# Patient Record
Sex: FEMALE | Race: BLACK OR AFRICAN AMERICAN | NOT HISPANIC OR LATINO | Employment: UNEMPLOYED
[De-identification: names, ages, dates, MRNs, and addresses within clinical notes are randomized per-mention and may not be internally consistent; named-entity substitution may affect disease eponyms.]

---

## 2018-10-11 ENCOUNTER — RECORDS - HEALTHEAST (OUTPATIENT)
Dept: ADMINISTRATIVE | Facility: OTHER | Age: 33
End: 2018-10-11

## 2021-05-30 ENCOUNTER — RECORDS - HEALTHEAST (OUTPATIENT)
Dept: ADMINISTRATIVE | Facility: CLINIC | Age: 36
End: 2021-05-30

## 2021-11-24 ENCOUNTER — TELEPHONE (OUTPATIENT)
Dept: ENDOCRINOLOGY | Facility: CLINIC | Age: 36
End: 2021-11-24
Payer: COMMERCIAL

## 2021-11-24 NOTE — TELEPHONE ENCOUNTER
Patient interested in weight loss surgery    César Son    Instructed to check with insurance company regarding exclusions prior to first appt.    Scheduled to see CNP or LEAH at 1:00 pm,       Left message:12:13 pm    Can find information on [employer]ariatrix products at: [email or web address]    Instructed to view seminar and complete pre-visit questionnaire prior to visit at [email or web address].    [phone] contact center phone number      MAHESH LAMBERT CMA

## 2021-12-05 ENCOUNTER — HEALTH MAINTENANCE LETTER (OUTPATIENT)
Age: 36
End: 2021-12-05

## 2021-12-27 ENCOUNTER — LAB (OUTPATIENT)
Dept: LAB | Facility: CLINIC | Age: 36
End: 2021-12-27
Payer: COMMERCIAL

## 2021-12-27 ENCOUNTER — OFFICE VISIT (OUTPATIENT)
Dept: ENDOCRINOLOGY | Facility: CLINIC | Age: 36
End: 2021-12-27
Payer: COMMERCIAL

## 2021-12-27 VITALS
DIASTOLIC BLOOD PRESSURE: 93 MMHG | WEIGHT: 252.6 LBS | HEIGHT: 66 IN | BODY MASS INDEX: 40.6 KG/M2 | OXYGEN SATURATION: 98 % | SYSTOLIC BLOOD PRESSURE: 147 MMHG | HEART RATE: 89 BPM

## 2021-12-27 DIAGNOSIS — E66.813 CLASS 3 SEVERE OBESITY DUE TO EXCESS CALORIES WITH SERIOUS COMORBIDITY AND BODY MASS INDEX (BMI) OF 40.0 TO 44.9 IN ADULT (H): Primary | ICD-10-CM

## 2021-12-27 DIAGNOSIS — E66.01 CLASS 3 SEVERE OBESITY DUE TO EXCESS CALORIES WITH SERIOUS COMORBIDITY AND BODY MASS INDEX (BMI) OF 40.0 TO 44.9 IN ADULT (H): Primary | ICD-10-CM

## 2021-12-27 DIAGNOSIS — E66.813 CLASS 3 SEVERE OBESITY DUE TO EXCESS CALORIES WITH SERIOUS COMORBIDITY AND BODY MASS INDEX (BMI) OF 40.0 TO 44.9 IN ADULT (H): ICD-10-CM

## 2021-12-27 DIAGNOSIS — F31.9 BIPOLAR DEPRESSION (H): ICD-10-CM

## 2021-12-27 DIAGNOSIS — E66.01 CLASS 3 SEVERE OBESITY DUE TO EXCESS CALORIES WITH SERIOUS COMORBIDITY AND BODY MASS INDEX (BMI) OF 40.0 TO 44.9 IN ADULT (H): ICD-10-CM

## 2021-12-27 PROBLEM — Z86.32 HISTORY OF GESTATIONAL DIABETES: Status: ACTIVE | Noted: 2018-04-19

## 2021-12-27 PROBLEM — K85.90 PANCREATITIS: Status: ACTIVE | Noted: 2021-10-23

## 2021-12-27 PROBLEM — G47.33 OSA (OBSTRUCTIVE SLEEP APNEA): Status: ACTIVE | Noted: 2021-11-10

## 2021-12-27 LAB
ALBUMIN SERPL-MCNC: 3.6 G/DL (ref 3.4–5)
ALP SERPL-CCNC: 60 U/L (ref 40–150)
ALT SERPL W P-5'-P-CCNC: 41 U/L (ref 0–50)
ANION GAP SERPL CALCULATED.3IONS-SCNC: 4 MMOL/L (ref 3–14)
AST SERPL W P-5'-P-CCNC: 31 U/L (ref 0–45)
BILIRUB SERPL-MCNC: 0.6 MG/DL (ref 0.2–1.3)
BUN SERPL-MCNC: 5 MG/DL (ref 7–30)
CALCIUM SERPL-MCNC: 9.1 MG/DL (ref 8.5–10.1)
CHLORIDE BLD-SCNC: 105 MMOL/L (ref 94–109)
CHOLEST SERPL-MCNC: 186 MG/DL
CO2 SERPL-SCNC: 30 MMOL/L (ref 20–32)
CREAT SERPL-MCNC: 0.72 MG/DL (ref 0.52–1.04)
DEPRECATED CALCIDIOL+CALCIFEROL SERPL-MC: 10 UG/L (ref 20–75)
ERYTHROCYTE [DISTWIDTH] IN BLOOD BY AUTOMATED COUNT: 14.1 % (ref 10–15)
FASTING STATUS PATIENT QL REPORTED: NO
GFR SERPL CREATININE-BSD FRML MDRD: >90 ML/MIN/1.73M2
GLUCOSE BLD-MCNC: 110 MG/DL (ref 70–99)
HBA1C MFR BLD: 5.9 % (ref 0–5.6)
HCT VFR BLD AUTO: 41 % (ref 35–47)
HDLC SERPL-MCNC: 85 MG/DL
HGB BLD-MCNC: 12.9 G/DL (ref 11.7–15.7)
LDLC SERPL CALC-MCNC: 83 MG/DL
LIPASE SERPL-CCNC: 124 U/L (ref 73–393)
MCH RBC QN AUTO: 29.6 PG (ref 26.5–33)
MCHC RBC AUTO-ENTMCNC: 31.5 G/DL (ref 31.5–36.5)
MCV RBC AUTO: 94 FL (ref 78–100)
NONHDLC SERPL-MCNC: 101 MG/DL
PLATELET # BLD AUTO: 415 10E3/UL (ref 150–450)
POTASSIUM BLD-SCNC: 3.9 MMOL/L (ref 3.4–5.3)
PROT SERPL-MCNC: 7.6 G/DL (ref 6.8–8.8)
PTH-INTACT SERPL-MCNC: 66 PG/ML (ref 18–80)
RBC # BLD AUTO: 4.36 10E6/UL (ref 3.8–5.2)
SODIUM SERPL-SCNC: 139 MMOL/L (ref 133–144)
TRIGL SERPL-MCNC: 89 MG/DL
WBC # BLD AUTO: 5.4 10E3/UL (ref 4–11)

## 2021-12-27 PROCEDURE — 80061 LIPID PANEL: CPT | Performed by: PATHOLOGY

## 2021-12-27 PROCEDURE — 99204 OFFICE O/P NEW MOD 45 MIN: CPT | Performed by: PHYSICIAN ASSISTANT

## 2021-12-27 PROCEDURE — 80053 COMPREHEN METABOLIC PANEL: CPT | Performed by: PATHOLOGY

## 2021-12-27 PROCEDURE — 85027 COMPLETE CBC AUTOMATED: CPT | Performed by: PATHOLOGY

## 2021-12-27 PROCEDURE — 83970 ASSAY OF PARATHORMONE: CPT | Performed by: PHYSICIAN ASSISTANT

## 2021-12-27 PROCEDURE — 36415 COLL VENOUS BLD VENIPUNCTURE: CPT | Performed by: PATHOLOGY

## 2021-12-27 PROCEDURE — 82306 VITAMIN D 25 HYDROXY: CPT | Performed by: PHYSICIAN ASSISTANT

## 2021-12-27 PROCEDURE — 83036 HEMOGLOBIN GLYCOSYLATED A1C: CPT | Performed by: PATHOLOGY

## 2021-12-27 PROCEDURE — 83690 ASSAY OF LIPASE: CPT | Performed by: PATHOLOGY

## 2021-12-27 RX ORDER — ACETAMINOPHEN 325 MG/1
325-650 TABLET ORAL
COMMUNITY
Start: 2021-11-19

## 2021-12-27 RX ORDER — NIFEDIPINE 30 MG
1 TABLET, EXTENDED RELEASE ORAL DAILY
COMMUNITY
Start: 2021-11-19

## 2021-12-27 RX ORDER — AMLODIPINE BESYLATE 5 MG/1
5 TABLET ORAL
COMMUNITY
Start: 2021-05-11

## 2021-12-27 RX ORDER — HYDROMORPHONE HYDROCHLORIDE 2 MG/1
1 TABLET ORAL
COMMUNITY
Start: 2021-10-26 | End: 2023-07-27

## 2021-12-27 RX ORDER — BUPROPION HYDROCHLORIDE 150 MG/1
150 TABLET, EXTENDED RELEASE ORAL
COMMUNITY
Start: 2021-11-19

## 2021-12-27 RX ORDER — POTASSIUM CHLORIDE 1500 MG/1
1 TABLET, EXTENDED RELEASE ORAL DAILY
COMMUNITY
Start: 2021-11-19

## 2021-12-27 RX ORDER — ALBUTEROL SULFATE 90 UG/1
2 AEROSOL, METERED RESPIRATORY (INHALATION)
COMMUNITY
Start: 2021-03-22

## 2021-12-27 RX ORDER — L. ACIDOPHILUS/BIFIDO. LONGUM 15 MG
1 CAPSULE,DELAYED RELEASE (ENTERIC COATED) ORAL
COMMUNITY
Start: 2021-03-22

## 2021-12-27 RX ORDER — POLYETHYLENE GLYCOL 3350 17 G/17G
17 POWDER, FOR SOLUTION ORAL
COMMUNITY
Start: 2021-10-27

## 2021-12-27 RX ORDER — CYCLOBENZAPRINE HCL 5 MG
5 TABLET ORAL
COMMUNITY
Start: 2021-05-11

## 2021-12-27 RX ORDER — NAPROXEN 500 MG/1
500 TABLET ORAL
COMMUNITY
Start: 2021-05-11

## 2021-12-27 RX ORDER — SERTRALINE HYDROCHLORIDE 100 MG/1
50 TABLET, FILM COATED ORAL
COMMUNITY
Start: 2021-11-19 | End: 2022-04-11

## 2021-12-27 RX ORDER — METRONIDAZOLE 500 MG/1
500 TABLET ORAL
COMMUNITY
Start: 2021-12-16 | End: 2022-04-11

## 2021-12-27 RX ORDER — ASPIRIN 81 MG/1
1 TABLET, CHEWABLE ORAL DAILY
COMMUNITY
Start: 2021-10-26

## 2021-12-27 RX ORDER — HYDROXYZINE PAMOATE 50 MG/1
50 CAPSULE ORAL
COMMUNITY
Start: 2021-11-19

## 2021-12-27 RX ORDER — IBUPROFEN 600 MG/1
600 TABLET, FILM COATED ORAL
COMMUNITY

## 2021-12-27 RX ORDER — SENNOSIDES A AND B 8.6 MG/1
1 TABLET, FILM COATED ORAL DAILY
COMMUNITY
Start: 2021-10-27

## 2021-12-27 ASSESSMENT — MIFFLIN-ST. JEOR: SCORE: 1862.54

## 2021-12-27 ASSESSMENT — PAIN SCALES - GENERAL: PAINLEVEL: NO PAIN (0)

## 2021-12-27 NOTE — PATIENT INSTRUCTIONS
MEDICATION STARTED AT THIS APPOINTMENT  We are starting topiramate at bedtime.  Start one tab, 25 mg, for a week. Go up to 50 mg (2 tabs) for the next week. At the third week, take  3 tabs (75 mg).  Stay at 3 tabs until you are seen again.     For any questions/concerns contact Kady Young LPN at [phone]    (Do not stop taking it if you don't think it's working. For some people it works even though they do not feel much different.)    Topiramate (Topamax) is a medication that is used most often to treat migraine headaches or for seizures. It has also been found to help with weight loss. Although it's not currently FDA approved for weight loss, it has been used safely for a number of years to help people who are carrying extra weight.     Just how topiramate helps with weight loss has not been exactly determined. However it seems to work on areas of the brain to quiet down signals related to eating.      Topiramate may make you:    >feel less interest in eating in between meals   >think less about food and eating   >find it easier to push the plate away   >find giving up pop easier    >have an easier time eating less    For some of our patients, the pills work right away. They feel and think quite differently about food. Other patients don't feel much of a change but find in fact they have lost weight! Like all weight loss medications, topiramate works best when you help it work.  This means:    1) Have less tempting high calorie (fattening) food around the house or office    2) Have lower calorie food (fruits, vegetables,low fat meats and dairy) for snacks    3) Eat out only one time or less each week.   4) Eat your meals at a table with the TV or computer off.    Side-effects. Topiramate is generally well tolerated. The main side-effects we see are:   Tingling in hands,feet, or face (usually not very troublesome)   Mental confusion and word finding trouble (about 10% of patients have this.)     Feeling  sleepy or a bit dopey- this goes away very soon after starting.    One of the dangers of topiramate is the possibility of birth defects--if you get pregnant when you are on it, there is the risk that your baby will be born with a cleft lip or palate.  If you are on topiramate and of child bearing age, you need to be on a reliable form of birth control or refrain from sexual intercourse.     Please refer to the pharmacy insert for more information on side-effects. Since many pharmacists are not familiar with the use of topiramate in weight loss, calling the clinic will get you the most accurate information on the use of this medication for weight loss.     In order to get refills of this or any medication we prescribe you must be seen in the medical weight mgmt clinic every 2-3 months. Please have your pharmacy fax a refill request to [phone].    MEDICATION STARTED AT THIS APPOINTMENT    We are starting a GLP-1 (Glucagon-like Peptide-1) medication called Saxenda. One of the ways it works is by slowing down the rate that food leaves your stomach. You feel puckett and will eat less. It also helps regulate hormones that can help improve your blood sugars.    If you are a patient that checks blood sugars, continue to check as instructed by your doctor. Low blood sugars are rare but can happen if patients are on insulin or other oral agents. If you notice consistent low sugars or high sugars, your medication may need to be adjusted after your appointment. If this is the case, please call RN and provide her your blood sugar record from the last 3-4 days. The RN will get in touch with the doctor and call you back/[employer] message with recommendations. We tolerate high sugars for a bit, so if sugars are running 180-200, this is ok. As weight starts dropping the blood sugars should too. If readings are consistently over 200 for 1-2 weeks, then you should call the doctor/nurse.    Dosing for this medication:   Week 1-  Inject 0.6 mg daily  Week 2- Inject 1.2 mg daily  Week 3- Inject 1.8 mg daily  Week 4- Inject 2.4 mg daily  Week 5 and thereafter- Inject 3.0 mg daily    Side effects of GLP- Medications include: The most common side effects are all GI related and consist of: nausea, constipation, diarrhea, burping, or gassiness. Patients are advised to eat slowly and less, and nausea typically passes if people can stick it out.     The risk of pancreatitis (inflammation of the pancreas) has been associated with this type of medication, but is very rare.  If you have had pancreatitis in the past, this medication may not be for you. Please let us know about any past history of pancreas problems.    Symptoms of pancreatitis include: Pain in your upper stomach area which may travel to your back and be worse after eating. Your stomach area may be tender to the touch.  You may have vomiting or nausea and/or have a fever. If you should develop any of these symptoms, stop the medication and contact your primary care doctor. They will do a blood test to check for pancreatitis.         There is a small chance you may have some low blood sugar after taking the medication.   The signs of low blood sugar are:  o Weakness  o Shaky   o Hungry  o Sweating  o Confusion      See below for ways to treat low blood sugar without adding in lots of extra calories.      Treating Low Blood Sugar    If you have symptoms of low blood sugar (sweating, shaking, dizzy, confused) eat 15 grams of carbs and wait 15 minutes:      Glucose Tabs are best for sugars under 70 -  Dex4 or BD Glucose tablets are good, you will need to take 3-4 of these to equal 15 grams.       One small box of raisins    4 oz fruit juice box or   cup fruit juice    1 small apple    1 small banana      cup canned fruit in water      English muffin or a slice of bread with jelly     1 low fat frozen waffle with sugar-free syrup      cup cottage cheese with   cup frozen or fresh  blueberries    1 cup skim or low-fat milk      cup whole grain cereal    4-6 crackers such as Triscuits      This medication is usually not covered by insurance and can be quite expensive. Sometimes a prior authorization is required, which may take up to 1-2 weeks for an insurance company to make a decision if they will cover the medication. Please be patient, you will be notified after a decision has been made.    For any questions/concerns contact Kady Young LPN at [phone]     (Do not stop taking it if you don't think it's working. For some people it works without them knowing it.)     In order to get refills of this or any medication we prescribe you must be seen in the medical weight mgmt clinic every 2-4 months. Please have your pharmacy fax a refill request to [phone].

## 2021-12-27 NOTE — PROGRESS NOTES
"45 minutes spent on the date of the encounter doing chart review, history and exam, documentation and further activities per the note    New Bariatric Surgery Consultation Note    2021    RE: César Son  MR#: [id number]  : 1985      Referring provider:       2021   Who referred you? [facility] at [facility]       Chief Complaint/Reason for visit: evaluation for possible weight loss surgery    Dear No primary care provider on file.,    I had the pleasure of seeing your patient, César Son, to evaluate her obesity and consider her for possible weight loss surgery. As you know, César Son is 36 year old.  She has a height of 5' 6\", a weight of 252 lbs 9.6 oz, and calculated Body mass index is 40.77 kg/m .    Assessment & Plan   Problem List Items Addressed This Visit        Endocrine Diagnoses    Class 3 severe obesity due to excess calories with serious comorbidity and body mass index (BMI) of 40.0 to 44.9 in adult (H) - Primary    Relevant Medications    metFORMIN (GLUCOPHAGE) 1000 MG tablet    Other Relevant Orders    CBC with platelets (Completed)    Comprehensive metabolic panel (Completed)    Hemoglobin A1c (Completed)    Vitamin D Deficiency (Completed)    Parathyroid Hormone Intact (Completed)    Lipid panel reflex to direct LDL Fasting (Completed)    Lipase (Completed)      Other Visit Diagnoses     Bipolar depression (H)        Relevant Medications    sertraline (ZOLOFT) 100 MG tablet    hydrOXYzine (VISTARIL) 50 MG capsule    buPROPion (WELLBUTRIN SR) 150 MG 12 hr tablet    Other Relevant Orders    Adult Mental Health Referral    Adult Mental Health Referral           HISTORY OF PRESENT ILLNESS:  Weight Loss History Reviewed with Patient 2021   How long have you been overweight? Since late teens through early 20's   What is the most that you have ever weighed? 300   I have tried the following methods to lose weight Watching portions or calories, " Exercise, Slimfast, OTC Medications   I have tried the following weight loss medications? (Check all that apply) None   Have you ever had weight loss surgery? No     2004: weight gain after first pregnancy  6 children total  2018: last pregnancy  HTN: multiple meds  Back pain: worse with weight gain.  Takes muscle relaxers and dilaudid 2mg twice daily, prescribed by PCP [facility]  DILAN: dx with sleep study but couldn't tolerated CPAP  Gestational diabetes and prediabetes: A1C 5.6 on metformin once daily. Addendum: A1C 5.9 today.  One episode of acute pancreatitis Oct 2021. Per patient this was related to one episode of excessive alcohol use which is abnormal for her. Addendum: repeat lipase today normal    CO-MORBIDITIES OF OBESITY INCLUDE:     12/27/2021   I have the following health issues associated with obesity: Type II Diabetes, High Blood Pressure, Sleep Apnea, Asthma, Hypothyroidism   DVT 2011 during pregnancy    Patient Active Problem List    Diagnosis Date Noted     Class 3 severe obesity due to excess calories with serious comorbidity and body mass index (BMI) of 40.0 to 44.9 in adult (H) 12/27/2021     Priority: Medium     Diabetes mellitus type 2 in obese (H) 12/14/2021     Priority: Medium     DILAN (obstructive sleep apnea) 11/10/2021     Priority: Medium     Pancreatitis 10/23/2021     Priority: Medium     History of gestational diabetes 04/19/2018     Priority: Medium     Bilateral low back pain 04/20/2016     Priority: Medium     HTN (hypertension) 04/20/2016     Priority: Medium     Prediabetes 04/20/2016     Priority: Medium     Carpal tunnel syndrome 06/24/2011     Priority: Medium     Heartburn 06/24/2011     Priority: Medium     Migraine 06/24/2011     Priority: Medium     Major depression, recurrent (H) 09/24/2010     Priority: Medium         PAST SURGICAL HISTORY:  Tubal ligation      SOCIAL HISTORY:   Social History Questions Reviewed With Patient 12/27/2021   Which best describes  your employment status (select all that apply) I work part-time   If you work, what is your occupation? [employer]   Which best describes your marital status: in a relationship   Do you have children? Yes   Who do you have in your support network that can be available to help you for the first 2 weeks after surgery? Kid's father, kids   Who can you count on for support throughout your weight loss surgery journey? kid's father, kids   Can you afford 3 meals a day?  Yes   Can you afford 50-60 dollars a month for vitamins? Yes   Lives with 6 kids and father     HABITS:     12/27/2021   How often do you drink alcohol? 2-4 times a month   If you do drink alcohol, how many drinks might you have in a day? (one drink = 5 oz. wine, 1 can/bottle of beer, 1 shot liquor) 3 or 4   Have you ever used any of the following nicotine products? Cigarettes   If you previously used any of these products, what year did you quit? 2006   Have you or are you currently using street drugs or prescription strength medication for which you do not have a prescription for? No   Do you have a history of chemical dependency (alcohol or drug abuse)? Yes   No hx of alcohol abuse  Hx of smoking marijuana last use 2 years ago  Quit smoking 2006    Currently taking narcotic/opioids Yes    PSYCHOLOGICAL HISTORY:   Psychological History Reviewed With Patient 12/27/2021   Have you ever attempted suicide? In the last 5 to 10 years. 2008, 20011   Have you had thoughts of suicide in the past year? No   Have you ever been hospitalized for mental illness or a suicide attempt? Never.   Do you have a history of chronic pain? Yes   Have you ever been diagnosed with fibromyalgia? No   Are you currently being treated for any of the following? (select all that apply) Depression, Anxiety, Bipolar, Post traumatic stress disorder   Are you currently seeing a therapist or counselor?  No   Are you currently seeing a psychiatrist? No   Referred to therapist and  psychiatrist but feels like it is too much to do now.    Struggles with episodes of depression  Trying to move to a better neighborhood  Initially reported dx of schizophrenia as a child but per pt no visual or auditory hallucinations and upon questionning reported this as bipolar and not schizophrenia.    ROS:     12/27/2021   Skin:  Leg swelling   HEENT: Headaches   Musculoskeletal: Joint Pain, Back pain, Swelling of legs   Cardiovascular: Shortness of breath with activity   Pulmonary: Shortness of breath with activity, Snoring, People have told me I stop breathing while asleep, Excessively sleep during the day, Experience morning headaches   Gastrointestinal: Reflux, Diarrhea, Constipation   Genitourinary: None of the above   Hematological: Clotting problems, Family history of blood clots   Neurological: Migraine headaches   Female only: Excessive menstrual bleeding, Regular menstrual cycles       EATING BEHAVIORS:     12/27/2021   Have you or anyone else thought that you had an eating disorder? No   Do you currently binge eat (eat a large amount of food in a short time)? Yes   Are you an emotional eater? Yes   Do you get up to eat after falling asleep? Yes       EXERCISE:     12/27/2021   How often do you exercise? Daily   What is the duration of your exercise (in minutes)? 45 Minutes   What types of exercise do you do? walking, home gym   What keeps you from being more active?  I am as active as I can possbily be       MEDICATIONS:  Current Outpatient Medications   Medication Sig Dispense Refill     acetaminophen (TYLENOL) 325 MG tablet Take 325-650 mg by mouth       albuterol (PROAIR HFA/PROVENTIL HFA/VENTOLIN HFA) 108 (90 Base) MCG/ACT inhaler Inhale 2 puffs into the lungs       amLODIPine (NORVASC) 5 MG tablet Take 5 mg by mouth       aspirin (ASA) 81 MG chewable tablet Take 1 tablet by mouth daily       blood glucose ([employer] BLOOD GLUCOSE TEST) test strip 1 each       buPROPion (WELLBUTRIN SR) 150 MG 12 hr  "tablet Take 150 mg by mouth       cholecalciferol 50 MCG (2000 UT) CAPS Take 2,000 Units by mouth       cyclobenzaprine (FLEXERIL) 5 MG tablet Take 5 mg by mouth       HYDROmorphone (DILAUDID) 2 MG tablet Take 1 mg by mouth       hydrOXYzine (VISTARIL) 50 MG capsule Take 50 mg by mouth       ibuprofen (ADVIL/MOTRIN) 600 MG tablet Take 600 mg by mouth       metFORMIN (GLUCOPHAGE) 1000 MG tablet Take 1,000 mg by mouth       metroNIDAZOLE (FLAGYL) 500 MG tablet Take 500 mg by mouth       naproxen (NAPROSYN) 500 MG tablet Take 500 mg by mouth       NIFEdipine ER (ADALAT CC) 30 MG 24 hr tablet Take 1 tablet by mouth daily       polyethylene glycol (MIRALAX) 17 GM/Dose powder Take 17 g by mouth       potassium chloride ER (KLOR-CON M) 20 MEQ CR tablet Take 1 tablet by mouth daily       senna (SENOKOT) 8.6 MG tablet Take 1 tablet by mouth daily       sertraline (ZOLOFT) 100 MG tablet Take 50 mg by mouth         ALLERGIES:  Allergies   Allergen Reactions     Oxycodone Rash       No results found for any previous visit.       PHYSICAL EXAM:  Objective      BP (!) 147/93 (BP Location: Left arm, Patient Position: Sitting, Cuff Size: Adult Large)   Pulse 89   Ht 1.676 m (5' 6\")   Wt 114.6 kg (252 lb 9.6 oz)   SpO2 98%   BMI 40.77 kg/m    Body mass index is 40.77 kg/m .  Physical Exam   GENERAL: Healthy, alert and no distress  EYES: Eyes grossly normal to inspection.  No discharge or erythema, or obvious scleral/conjunctival abnormalities.  RESP: No audible wheeze, cough, or visible cyanosis.  No visible retractions or increased work of breathing.    SKIN: Visible skin clear. No significant rash, abnormal pigmentation or lesions.  NEURO: Cranial nerves grossly intact.  Mentation and speech appropriate for age.  PSYCH: Mentation appears normal, affect normal/bright, judgement and insight intact, normal speech and appearance well-groomed.        In summary, César Son has Class III obesity with a body mass index of Body " mass index is 40.77 kg/m . kg/m2 and the comorbidities stated above. She completed an informational seminar and is a possible candidate for the laparoscopic gastric sleeve.  She will have to complete the following pre-requisites:    If you have not already watched our online seminar please go to [email or web address]    Weight loss requirement: 10lbs prior to surgery. Will have final weight check 2-3 weeks prior to surgery at anesthesia or nurse pre-op teaching visit.      Schedule bariatric psych eval as soon as possible.  List of psychologists will be sent to you via [employer] or given to you in clinic.     Call Marcus Tavarez at [phone] to discuss insurance coverage for bariatric surgery.  Please check with your insurance regarding bariatric surgery coverage also. Marcus can also help you with scheduling psych eval if you are having difficulties.    The following clearance letters are needed: Letters will be sent to you via [employer] or given to you in clinic  - Letter of support from primary care provider. Provider can submit through electronic medical record or fax to [phone]  - Letter from therapist (needs to establish care, referral placed today)  - Letter from psychiatrist (needs to establish care, referral placed today)  -Sleep clinic clearance, sleep referral entered today  -Psych eval: Initially reported dx of schizophrenia as a child but per pt no visual or auditory hallucinations and upon questionning reported this as bipolar and not schizophrenia. Will need evaluation for accurate psychiatric diagnoses and psychiatric stability.  No hx of hospitalizations. Suicide attempts in 2008 and 2011.    Weight loss medications: Consider topiramate (no hx of kidney stones) or Saxenda if covered by insurance. (one episode of pancreatitis with episode of heavy alcohol use. No longer using alcohol and aware of risk).  Review info and send [employer] message to our team.    Smoking cessation and nicotine test  needed: No    Birth control after surgery discussed. Patient instructed that 2 forms of birth control required after surgery and to avoid pregnancy for at least 18 months after surgery: Tubal ligation    NEXT VISITS: A  should reach out to you to schedule the following appointments.  If they do not reach you please call [phone] to schedule the following appointments:     -See dietitian in 1 month and monthly for 6 months    -See Renetta in 3 months to follow up on pre-op weight loss and weight loss medications    -See Dr Slade after psych eval, letters from therapist and psychiatrist    Today in the office we discussed gastric sleeve surgery. Preoperative, perioperative, and postoperative processes, management, and follow up were addressed.  Risks and benefits were outlined including the risk of death, staple line leak (1-2%), PE, DVT, ulcer, worsening GERD, N/V, stricture, hernia, wound infection, weight regain, and vitamin deficiencies. I emphasized exercise and activity along with appropriate food choice as the main foundation for weight loss with surgery providing surgical reinforcement of this.  All questions were answered.  A goal sheet and support group handout were given to the patient.      Once the patient has completed the requirements in their task list and there are no further recommendations, the pt will be allowed to see the surgeon of her choice for consultation on the laparoscopic gastric sleeve surgery. Patient verbalizes understanding of the process to surgery and expectations for the postoperative period including the need for lifelong lifestyle changes, vitamin supplementation, and laboratory monitoring.    Sincerely,     Renetta Arora PA-C

## 2021-12-27 NOTE — Clinical Note
NBS I saw today.  She is not sure about surgery and also has some psych concerns.  She initially thought she had schizophrenia but upon questioning and chart review I don't think so.  I think she has bipolar and hasn't had psychiatrist or therapist for a while now.  I told her she would need psych eval, to establish with therapist and psychiatrist and think about surgery vs. MWM.  Hold off on Dr Slade until after psych stuff done.  Thanks!

## 2021-12-27 NOTE — LETTER
"2021       RE: César Son  [address]  [place] [address]     Dear Colleague,    Thank you for referring your patient, César Son, to the [facility]. Please see a copy of my visit note below.    45 minutes spent on the date of the encounter doing chart review, history and exam, documentation and further activities per the note    New Bariatric Surgery Consultation Note    2021    RE: César Son  MR#: [id number]  : 1985      Referring provider:       2021   Who referred you? [facility] at [facility]       Chief Complaint/Reason for visit: evaluation for possible weight loss surgery    Dear No primary care provider on file.,    I had the pleasure of seeing your patient, César Son, to evaluate her obesity and consider her for possible weight loss surgery. As you know, César Son is 36 year old.  She has a height of 5' 6\", a weight of 252 lbs 9.6 oz, and calculated Body mass index is 40.77 kg/m .    Assessment & Plan   Problem List Items Addressed This Visit        Endocrine Diagnoses    Class 3 severe obesity due to excess calories with serious comorbidity and body mass index (BMI) of 40.0 to 44.9 in adult (H) - Primary    Relevant Medications    metFORMIN (GLUCOPHAGE) 1000 MG tablet    Other Relevant Orders    CBC with platelets (Completed)    Comprehensive metabolic panel (Completed)    Hemoglobin A1c (Completed)    Vitamin D Deficiency (Completed)    Parathyroid Hormone Intact (Completed)    Lipid panel reflex to direct LDL Fasting (Completed)    Lipase (Completed)      Other Visit Diagnoses     Bipolar depression (H)        Relevant Medications    sertraline (ZOLOFT) 100 MG tablet    hydrOXYzine (VISTARIL) 50 MG capsule    buPROPion (WELLBUTRIN SR) 150 MG 12 hr tablet    Other Relevant Orders    Adult Mental Health Referral    " Adult Mental Health Referral           HISTORY OF PRESENT ILLNESS:  Weight Loss History Reviewed with Patient 12/27/2021   How long have you been overweight? Since late teens through early 20's   What is the most that you have ever weighed? 300   I have tried the following methods to lose weight Watching portions or calories, Exercise, Slimfast, OTC Medications   I have tried the following weight loss medications? (Check all that apply) None   Have you ever had weight loss surgery? No     2004: weight gain after first pregnancy  6 children total  2018: last pregnancy  HTN: multiple meds  Back pain: worse with weight gain.  Takes muscle relaxers and dilaudid 2mg twice daily, prescribed by PCP [facility]  DILAN: dx with sleep study but couldn't tolerated CPAP  Gestational diabetes and prediabetes: A1C 5.6 on metformin once daily. Addendum: A1C 5.9 today.  One episode of acute pancreatitis Oct 2021. Per patient this was related to one episode of excessive alcohol use which is abnormal for her. Addendum: repeat lipase today normal    CO-MORBIDITIES OF OBESITY INCLUDE:     12/27/2021   I have the following health issues associated with obesity: Type II Diabetes, High Blood Pressure, Sleep Apnea, Asthma, Hypothyroidism   DVT 2011 during pregnancy    Patient Active Problem List    Diagnosis Date Noted     Class 3 severe obesity due to excess calories with serious comorbidity and body mass index (BMI) of 40.0 to 44.9 in adult (H) 12/27/2021     Priority: Medium     Diabetes mellitus type 2 in obese (H) 12/14/2021     Priority: Medium     DILAN (obstructive sleep apnea) 11/10/2021     Priority: Medium     Pancreatitis 10/23/2021     Priority: Medium     History of gestational diabetes 04/19/2018     Priority: Medium     Bilateral low back pain 04/20/2016     Priority: Medium     HTN (hypertension) 04/20/2016     Priority: Medium     Prediabetes 04/20/2016     Priority: Medium     Carpal tunnel syndrome 06/24/2011      Priority: Medium     Heartburn 06/24/2011     Priority: Medium     Migraine 06/24/2011     Priority: Medium     Major depression, recurrent (H) 09/24/2010     Priority: Medium         PAST SURGICAL HISTORY:  Tubal ligation      SOCIAL HISTORY:   Social History Questions Reviewed With Patient 12/27/2021   Which best describes your employment status (select all that apply) I work part-time   If you work, what is your occupation? [employer]   Which best describes your marital status: in a relationship   Do you have children? Yes   Who do you have in your support network that can be available to help you for the first 2 weeks after surgery? Kid's father, kids   Who can you count on for support throughout your weight loss surgery journey? kid's father, kids   Can you afford 3 meals a day?  Yes   Can you afford 50-60 dollars a month for vitamins? Yes   Lives with 6 kids and father     HABITS:     12/27/2021   How often do you drink alcohol? 2-4 times a month   If you do drink alcohol, how many drinks might you have in a day? (one drink = 5 oz. wine, 1 can/bottle of beer, 1 shot liquor) 3 or 4   Have you ever used any of the following nicotine products? Cigarettes   If you previously used any of these products, what year did you quit? 2006   Have you or are you currently using street drugs or prescription strength medication for which you do not have a prescription for? No   Do you have a history of chemical dependency (alcohol or drug abuse)? Yes   No hx of alcohol abuse  Hx of smoking marijuana last use 2 years ago  Quit smoking 2006    Currently taking narcotic/opioids Yes    PSYCHOLOGICAL HISTORY:   Psychological History Reviewed With Patient 12/27/2021   Have you ever attempted suicide? In the last 5 to 10 years. 2008, [address]   Have you had thoughts of suicide in the past year? No   Have you ever been hospitalized for mental illness or a suicide attempt? Never.   Do you have a history of chronic pain? Yes    Have you ever been diagnosed with fibromyalgia? No   Are you currently being treated for any of the following? (select all that apply) Depression, Anxiety, Bipolar, Post traumatic stress disorder   Are you currently seeing a therapist or counselor?  No   Are you currently seeing a psychiatrist? No   Referred to therapist and psychiatrist but feels like it is too much to do now.    Struggles with episodes of depression  Trying to move to a better neighborhood  Initially reported dx of schizophrenia as a child but per pt no visual or auditory hallucinations and upon questionning reported this as bipolar and not schizophrenia.    ROS:     12/27/2021   Skin:  Leg swelling   HEENT: Headaches   Musculoskeletal: Joint Pain, Back pain, Swelling of legs   Cardiovascular: Shortness of breath with activity   Pulmonary: Shortness of breath with activity, Snoring, People have told me I stop breathing while asleep, Excessively sleep during the day, Experience morning headaches   Gastrointestinal: Reflux, Diarrhea, Constipation   Genitourinary: None of the above   Hematological: Clotting problems, Family history of blood clots   Neurological: Migraine headaches   Female only: Excessive menstrual bleeding, Regular menstrual cycles       EATING BEHAVIORS:     12/27/2021   Have you or anyone else thought that you had an eating disorder? No   Do you currently binge eat (eat a large amount of food in a short time)? Yes   Are you an emotional eater? Yes   Do you get up to eat after falling asleep? Yes       EXERCISE:     12/27/2021   How often do you exercise? Daily   What is the duration of your exercise (in minutes)? 45 Minutes   What types of exercise do you do? walking, home gym   What keeps you from being more active?  I am as active as I can possbily be       MEDICATIONS:  Current Outpatient Medications   Medication Sig Dispense Refill     acetaminophen (TYLENOL) 325 MG tablet Take 325-650 mg by mouth       albuterol (PROAIR  "HFA/PROVENTIL HFA/VENTOLIN HFA) 108 (90 Base) MCG/ACT inhaler Inhale 2 puffs into the lungs       amLODIPine (NORVASC) 5 MG tablet Take 5 mg by mouth       aspirin (ASA) 81 MG chewable tablet Take 1 tablet by mouth daily       blood glucose ([employer] BLOOD GLUCOSE TEST) test strip 1 each       buPROPion (WELLBUTRIN SR) 150 MG 12 hr tablet Take 150 mg by mouth       cholecalciferol 50 MCG (2000 UT) CAPS Take 2,000 Units by mouth       cyclobenzaprine (FLEXERIL) 5 MG tablet Take 5 mg by mouth       HYDROmorphone (DILAUDID) 2 MG tablet Take 1 mg by mouth       hydrOXYzine (VISTARIL) 50 MG capsule Take 50 mg by mouth       ibuprofen (ADVIL/MOTRIN) 600 MG tablet Take 600 mg by mouth       metFORMIN (GLUCOPHAGE) 1000 MG tablet Take 1,000 mg by mouth       metroNIDAZOLE (FLAGYL) 500 MG tablet Take 500 mg by mouth       naproxen (NAPROSYN) 500 MG tablet Take 500 mg by mouth       NIFEdipine ER (ADALAT CC) 30 MG 24 hr tablet Take 1 tablet by mouth daily       polyethylene glycol (MIRALAX) 17 GM/Dose powder Take 17 g by mouth       potassium chloride ER (KLOR-CON M) 20 MEQ CR tablet Take 1 tablet by mouth daily       senna (SENOKOT) 8.6 MG tablet Take 1 tablet by mouth daily       sertraline (ZOLOFT) 100 MG tablet Take 50 mg by mouth         ALLERGIES:  Allergies   Allergen Reactions     Oxycodone Rash       No results found for any previous visit.       PHYSICAL EXAM:  Objective      BP (!) 147/93 (BP Location: Left arm, Patient Position: Sitting, Cuff Size: Adult Large)   Pulse 89   Ht 1.676 m (5' 6\")   Wt 114.6 kg (252 lb 9.6 oz)   SpO2 98%   BMI 40.77 kg/m    Body mass index is 40.77 kg/m .  Physical Exam   GENERAL: Healthy, alert and no distress  EYES: Eyes grossly normal to inspection.  No discharge or erythema, or obvious scleral/conjunctival abnormalities.  RESP: No audible wheeze, cough, or visible cyanosis.  No visible retractions or increased work of breathing.    SKIN: Visible skin clear. No significant rash, " abnormal pigmentation or lesions.  NEURO: Cranial nerves grossly intact.  Mentation and speech appropriate for age.  PSYCH: Mentation appears normal, affect normal/bright, judgement and insight intact, normal speech and appearance well-groomed.        In summary, César Son has Class III obesity with a body mass index of Body mass index is 40.77 kg/m . kg/m2 and the comorbidities stated above. She completed an informational seminar and is a possible candidate for the laparoscopic gastric sleeve.  She will have to complete the following pre-requisites:    If you have not already watched our online seminar please go to [email or web address]    Weight loss requirement: 10lbs prior to surgery. Will have final weight check 2-3 weeks prior to surgery at anesthesia or nurse pre-op teaching visit.      Schedule bariatric psych eval as soon as possible.  List of psychologists will be sent to you via [employer] or given to you in clinic.     Call Marcus Tavarez at [phone] to discuss insurance coverage for bariatric surgery.  Please check with your insurance regarding bariatric surgery coverage also. Marcus can also help you with scheduling psych eval if you are having difficulties.    The following clearance letters are needed: Letters will be sent to you via [employer] or given to you in clinic  - Letter of support from primary care provider. Provider can submit through electronic medical record or fax to [phone]  - Letter from therapist (needs to establish care, referral placed today)  - Letter from psychiatrist (needs to establish care, referral placed today)  -Sleep clinic clearance, sleep referral entered today  -Psych eval: Initially reported dx of schizophrenia as a child but per pt no visual or auditory hallucinations and upon questionning reported this as bipolar and not schizophrenia. Will need evaluation for accurate psychiatric diagnoses and psychiatric stability.  No hx of hospitalizations. Suicide  attempts in 2008 and 2011.    Weight loss medications: Consider topiramate (no hx of kidney stones) or Saxenda if covered by insurance. (one episode of pancreatitis with episode of heavy alcohol use. No longer using alcohol and aware of risk).  Review info and send K[employer] message to our team.    Smoking cessation and nicotine test needed: No    Birth control after surgery discussed. Patient instructed that 2 forms of birth control required after surgery and to avoid pregnancy for at least 18 months after surgery: Tubal ligation    NEXT VISITS: A  should reach out to you to schedule the following appointments.  If they do not reach you please call [phone] to schedule the following appointments:     -See dietitian in 1 month and monthly for 6 months    -See Renetta in 3 months to follow up on pre-op weight loss and weight loss medications    -See Dr Slade after psych eval, letters from therapist and psychiatrist    Today in the office we discussed gastric sleeve surgery. Preoperative, perioperative, and postoperative processes, management, and follow up were addressed.  Risks and benefits were outlined including the risk of death, staple line leak (1-2%), PE, DVT, ulcer, worsening GERD, N/V, stricture, hernia, wound infection, weight regain, and vitamin deficiencies. I emphasized exercise and activity along with appropriate food choice as the main foundation for weight loss with surgery providing surgical reinforcement of this.  All questions were answered.  A goal sheet and support group handout were given to the patient.      Once the patient has completed the requirements in their task list and there are no further recommendations, the pt will be allowed to see the surgeon of her choice for consultation on the laparoscopic gastric sleeve surgery. Patient verbalizes understanding of the process to surgery and expectations for the postoperative period including the need for lifelong lifestyle changes,  vitamin supplementation, and laboratory monitoring.    Sincerely,     Renetta Arora PA-C            Again, thank you for allowing me to participate in the care of your patient.      Sincerely,    Renetta Arora PA-C

## 2021-12-27 NOTE — LETTER
Date:December 28, 2021      Patient was self referred, no letter generated. Do not send.        [facility] Health Information

## 2021-12-27 NOTE — NURSING NOTE
"(   Chief Complaint   Patient presents with     New Patient     Sudarshan kyle, BMI: 49     )    ( Weight: 114.6 kg (252 lb 9.6 oz) )  ( Height: 167.6 cm (5' 6\") )  ( BMI (Calculated): 40.77 )  (   )  (   )  (   )  (   )  (   )  (   )    ( BP: (!) 147/93 )  (   )  (   )  (   )  ( Pulse: 89 )  (   )  ( SpO2: 98 % )    ( There is no problem list on file for this patient.   )  (   Current Outpatient Medications   Medication Sig Dispense Refill     acetaminophen (TYLENOL) 325 MG tablet Take 325-650 mg by mouth       albuterol (PROAIR HFA/PROVENTIL HFA/VENTOLIN HFA) 108 (90 Base) MCG/ACT inhaler Inhale 2 puffs into the lungs       amLODIPine (NORVASC) 5 MG tablet Take 5 mg by mouth       aspirin (ASA) 81 MG chewable tablet Take 1 tablet by mouth daily       blood glucose ([employer] BLOOD GLUCOSE TEST) test strip 1 each       buPROPion (WELLBUTRIN SR) 150 MG 12 hr tablet Take 150 mg by mouth       cholecalciferol 50 MCG (2000 UT) CAPS Take 2,000 Units by mouth       cyclobenzaprine (FLEXERIL) 5 MG tablet Take 5 mg by mouth       HYDROmorphone (DILAUDID) 2 MG tablet Take 1 mg by mouth       hydrOXYzine (VISTARIL) 50 MG capsule Take 50 mg by mouth       ibuprofen (ADVIL/MOTRIN) 600 MG tablet Take 600 mg by mouth       metFORMIN (GLUCOPHAGE) 1000 MG tablet Take 1,000 mg by mouth       metroNIDAZOLE (FLAGYL) 500 MG tablet Take 500 mg by mouth       naproxen (NAPROSYN) 500 MG tablet Take 500 mg by mouth       NIFEdipine ER (ADALAT CC) 30 MG 24 hr tablet Take 1 tablet by mouth daily       polyethylene glycol (MIRALAX) 17 GM/Dose powder Take 17 g by mouth       potassium chloride ER (KLOR-CON M) 20 MEQ CR tablet Take 1 tablet by mouth daily       senna (SENOKOT) 8.6 MG tablet Take 1 tablet by mouth daily       sertraline (ZOLOFT) 100 MG tablet Take 50 mg by mouth      )  ( Diabetes Eval:    )    ( Pain Eval:  No Pain (0) )    ( Wound Eval:       )    (   History   Smoking Status     Never Smoker   Smokeless Tobacco     Not on file "    )    ( Signed By:  Steff Jones, EMT; December 27, 2021; 9:56 AM )

## 2021-12-28 DIAGNOSIS — E55.9 VITAMIN D DEFICIENCY: Primary | ICD-10-CM

## 2021-12-30 ENCOUNTER — TELEPHONE (OUTPATIENT)
Dept: ENDOCRINOLOGY | Facility: CLINIC | Age: 36
End: 2021-12-30

## 2021-12-30 NOTE — TELEPHONE ENCOUNTER
Patient no show for today's video visit. Sent text with link to join. Called pt, no answer. Sent [employer] message, no response. Closed video after 15 mins. Provided pt with number to reschedule.     Leanne Batpiste RD, LD

## 2022-01-18 NOTE — PROGRESS NOTES
"César Son is a 36 year old female who is being evaluated via a billable telephone visit. Could not get video connected on [employer] or "[employer] converted visit to phone.     The patient has been notified of following:     \"This telephone visit will be conducted via a call between you and your physician/provider. We have found that certain health care needs can be provided without the need for a physical exam.  This service lets us provide the care you need with a short phone conversation.  If a prescription is necessary we can send it directly to your pharmacy.  If lab work is needed we can place an order for that and you can then stop by our lab to have the test done at a later time.    Telephone visits are billed at different rates depending on your insurance coverage. During this emergency period, for some insurers they may be billed the same as an in-person visit.  Please reach out to your insurance provider with any questions.    If during the course of the call the physician/provider feels a telephone visit is not appropriate, you will not be charged for this service.\"    Patient has given verbal consent for Telephone visit?  Yes    How would you like to obtain your AVS? MyChart    Phone call duration: 32 minutes    During this virtual visit the patient is located in [place], patient verifies this as the location during the entirety of this visit.       New Bariatric Nutrition Consultation Note    Reason For Visit: Nutrition Assessment    César Son is a 36 year old presenting today for new bariatric nutrition consult.   Pt is interested in laparoscopic sleeve gastrectomy with Dr. Slade.  Patient is accompanied by self.  This is pt's first of 6 required nutrition visits prior to surgery.     Pt referred by DILLON Vu on January 18, 2022.  Patient with Co-morbidities of obesity including:  Type II DM yes  Renal Failure no  Sleep apnea yes  Hypertension yes   Dyslipidemia no  Joint pain yes  Back " "pain yes  GERD no     Support System Reviewed With Patient 12/27/2021   Who do you have in your support network that can be available to help you for the first 2 weeks after surgery? Kid's father, kids   Who can you count on for support throughout your weight loss surgery journey? kid's father, kids       ANTHROPOMETRICS:  Estimated body mass index is 40.77 kg/m  as calculated from the following:    Height as of 12/27/21: 1.676 m (5' 6\").    Weight as of 12/27/21: 114.6 kg (252 lb 9.6 oz).    Current weight: unknown     Required weight loss goal pre-op: 10 lbs from initial consult weight (goal weight 242 lbs or less before surgery)       12/27/2021   I have tried the following methods to lose weight Watching portions or calories, Exercise, Slimfast, OTC Medications       Weight Loss Questions Reviewed With Patient 12/27/2021   How long have you been overweight? Since late teens through early 20's       SUPPLEMENT INFORMATION:  Vitamin D    NUTRITION HISTORY:  NKFA. Does not tolerate greasy foods, example pt provided - ground beef  Previous experiences with loss: diet gummies (just made her go to the bathroom more), increased exercise.   Usually not having breakfast, and going a while until eating full meal  Lunch is biggest meal.  She describes having new early satiety at meals in past couple months. Not feeling that hungry will eat whatever.  Working on cutting out pop - 3-4 cans per day, down from 5-6 cans per day.   She has 6 kids, 3-17 years old. 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   Often portioning out more food than she finds she can eat.     Recall Diet Questions Reviewed With Patient 12/27/2021   Describe what you typically consume for breakfast (typical or most recent): Toast with butter and jelly   Describe what you typically consume for lunch (typical or most recent): 12-1pm: Chicken, many things at once, very big lunch; sandwich; cup noodles     Describe what you typically " consume for supper (typical or most recent): 7-9 pm Not a big dinner, leftovers - baked chicken/fish with rice/mac/potato/zucchini/squash; take-out    Describe what you typically consume as snacks (typical or most recent): Chips, pickles, ice cream, yogurt, fruit    How many ounces of water, or other low calorie drinks, do you drink daily (8 oz=1 glass)? 64 oz or more  (water, sugar-free tea, vitamin water)   How many ounces of caffeine (coffee, tea, pop) do you drink daily (8 oz=1 glass)? 8 oz (occ while at work, couple sips)   How many ounces of carbonated (pop, beer, sparkling water) drinks do you drinky daily (8 oz=1 glass)? 8 oz   How many ounces of juice, pop, sweet tea, sports drinks, protein drinks, other sweetened drinks, do you drink daily (8 oz=1 glass)? 8 oz   How many ounces of milk do you drink daily (8 oz=1 glass) 8 oz   Please indicate the type of milk: 1%   How often do you drink alcohol? 2-4 times a month   If you do drink alcohol, how many drinks might you have in a day? (one drink = 5 oz. wine, 1 can/bottle of beer, 1 shot liquor) 3 or 4       Eating Habits 12/27/2021   Do you have any dietary restrictions? No   Do you currently binge eat (eat a large amount of food in a short time)? Yes - not recently, feeling puckett quickers   Are you an emotional eater? Yes - depressed    Do you get up to eat after falling asleep? Yes   What foods do you crave? 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     Dining Out History Reviewed With Patient 12/27/2021   How often do you dine out? Around once a week.   Where do you dine out? (select all that apply) sit-down restaurants, fast food chains, take out   What types of food do you order when you dine out? Salad, chicken, burgers, pizza, tacos       Physical Activity Reviewed With Patient 12/27/2021   How often do  you exercise? Daily   What is the duration of your exercise (in minutes)? 45 Minutes   What types of exercise do you do? walking, home gym   What keeps you from being more active?  I am as active as I can possbily be         NUTRITION DIAGNOSIS:  Obesity r/t long history of positive energy balance aeb BMI >30 kg/m2.    INTERVENTION:  Intervention Provided/Education Provided on post-op diet guidelines, vitamins/minerals essential post-operatively, GI anatomy of bariatric surgeries, ways to help prepare for post-op diet guidelines pre-operatively, portion/calorie-control, mindful eating and sources of protein. Discussed importance of establishing a consistent meal pattern, increasing lean protein and fruit/veggies portions at meals, while decreasing starch (Plate Method for structure), and discussed meal replacement criteria (protein shake/bar). Provided education on healthy, high-fiber sources of carb and appropriate serving sizes for mgmt of DM. Patient demonstrates understanding. Provided pt with list of goals RD contact information.      Questions Reviewed With Patient 12/27/2021   How ready are you to make changes regarding your weight? Number 1 = Not ready at all to make changes up to 10 = very ready. 10   How confident are you that you can change? 1 = Not confident that you will be successful making changes up to 10 = very confident. 10       Expected Engagement: good    GOALS:  1) Continue to work on eliminating pop - aim for limit 2-3 cans per day.   2) Eat 3 meals per day. Avoid snacking as able.  3) Use the Plate Method for meals (see below)   - Protein shake/bar as back up meal  4) Eat slowly (20-30 minutes per meal), chewing foods well (25 chews per bite/applesauce consistency)  5) Do not drink with meals. Wait 30 mins after meal to drink again  6) Consume 64+ oz fluid per day. Small, frequent sips all day.    The Plate  Method:  https://cdn1.sph.[employer].edu/wp-content/uploads/sites/30/2012/09/IHPThs2488.jpg    https://www.cdc.gov/diabetes/images/managing/Diabetes-Manage-Eat-Well-Plate-Graphic_600px.jpg    Protein Sources for Weight Loss  [email or web address]     Carbohydrates  [email or web address]     Mindful Eating  [email or web address]     Summary of Volumetrics Eating Plan  [email or web address]     Diet Guidelines after Weight Loss Surgery  [email or web address]     Seated Exercises for Arms and Legs (can be done before or after surgery)  [email or web address]    Meal Replacement Shake Options:   *Protein Shake Criteria: no more than 210 Calories, at least 20 grams of protein, and less than 10 grams of sugar    [employer] [facility] smoothie (160 Calories, 20 g protein)   Premier Protein (160 Calories, 30 g protein)  Slim Fast Advanced Nutrition (180 Calories, 20 g protein)  Muscle Milk, lactose-free, 17 oz bottle (210 Calories, 30 g protein)  Integrated Supplements, no artificial sugars (110 Calories, 20 g protein)  Genepro, unflavored protein powder (60 Calories, 30 g protein)  Boost/Ensure Max (160 calories, 30 gm protein)   Fairlife Core Power (170 calories, 26 gm protein)    Meal Replacement Bar Options:  [facility] Protein Shake (160 Calories, 15 g protein)  Quest Protein Bars (190 Calories, 20 g protein)  Built Bar (170 Calories, 15-20 g protein)  One Protein Bar (210 calories, 20 g protein)  [place] Signature Protein Bar ([employer]) (190 Calories, 21 g protein)  Pure Protein Bars (180 Calories, 21 g protein)          Time spent with patient: 32 minutes.  Kanwal Hunter, ABBY, LD

## 2022-01-20 ENCOUNTER — VIRTUAL VISIT (OUTPATIENT)
Dept: ENDOCRINOLOGY | Facility: CLINIC | Age: 37
End: 2022-01-20
Payer: COMMERCIAL

## 2022-01-20 DIAGNOSIS — Z71.3 NUTRITIONAL COUNSELING: Primary | ICD-10-CM

## 2022-01-20 DIAGNOSIS — E66.9 OBESITY: ICD-10-CM

## 2022-01-20 DIAGNOSIS — E66.9 DIABETES MELLITUS TYPE 2 IN OBESE: ICD-10-CM

## 2022-01-20 DIAGNOSIS — E11.69 DIABETES MELLITUS TYPE 2 IN OBESE: ICD-10-CM

## 2022-01-20 PROCEDURE — 97802 MEDICAL NUTRITION INDIV IN: CPT | Mod: 95 | Performed by: DIETITIAN, REGISTERED

## 2022-01-20 NOTE — LETTER
"1/20/2022       RE: César Son  [address]  [place] [address]     Dear Colleague,    Thank you for referring your patient, César Son, to the [facility] [place] [facility]. Please see a copy of my visit note below.    César Son is a 36 year old female who is being evaluated via a billable telephone visit. Could not get video connected on [employer] or [employer], converted visit to phone.     The patient has been notified of following:     \"This telephone visit will be conducted via a call between you and your physician/provider. We have found that certain health care needs can be provided without the need for a physical exam.  This service lets us provide the care you need with a short phone conversation.  If a prescription is necessary we can send it directly to your pharmacy.  If lab work is needed we can place an order for that and you can then stop by our lab to have the test done at a later time.    Telephone visits are billed at different rates depending on your insurance coverage. During this emergency period, for some insurers they may be billed the same as an in-person visit.  Please reach out to your insurance provider with any questions.    If during the course of the call the physician/provider feels a telephone visit is not appropriate, you will not be charged for this service.\"    Patient has given verbal consent for Telephone visit?  Yes    How would you like to obtain your AVS? Genevahart    Phone call duration: 32 minutes    During this virtual visit the patient is located in [place], patient verifies this as the location during the entirety of this visit.       New Bariatric Nutrition Consultation Note    Reason For Visit: Nutrition Assessment    César Son is a 36 year old presenting today for new bariatric nutrition consult.   Pt is interested in laparoscopic sleeve gastrectomy with Dr." Seizure Action Plan Completed--paper copy placed in MA inbox. Pls make copy to scan to chart, provide original to Mom to complete emergency contact info and give to school nurse.      Jody Flores MD "Yany.  Patient is accompanied by self.  This is pt's first of 6 required nutrition visits prior to surgery.     Pt referred by DILLON Vu on January 18, 2022.  Patient with Co-morbidities of obesity including:  Type II DM yes  Renal Failure no  Sleep apnea yes  Hypertension yes   Dyslipidemia no  Joint pain yes  Back pain yes  GERD no     Support System Reviewed With Patient 12/27/2021   Who do you have in your support network that can be available to help you for the first 2 weeks after surgery? Kid's father, kids   Who can you count on for support throughout your weight loss surgery journey? kid's father, kids       ANTHROPOMETRICS:  Estimated body mass index is 40.77 kg/m  as calculated from the following:    Height as of 12/27/21: 1.676 m (5' 6\").    Weight as of 12/27/21: 114.6 kg (252 lb 9.6 oz).    Current weight: unknown     Required weight loss goal pre-op: 10 lbs from initial consult weight (goal weight 242 lbs or less before surgery)       12/27/2021   I have tried the following methods to lose weight Watching portions or calories, Exercise, Slimfast, OTC Medications       Weight Loss Questions Reviewed With Patient 12/27/2021   How long have you been overweight? Since late teens through early 20's       SUPPLEMENT INFORMATION:  Vitamin D    NUTRITION HISTORY:  NKFA. Does not tolerate greasy foods, example pt provided - ground beef  Previous experiences with loss: diet gummies (just made her go to the bathroom more), increased exercise.   Usually not having breakfast, and going a while until eating full meal  Lunch is biggest meal.  She describes having new early satiety at meals in past couple months. Not feeling that hungry will eat whatever.  Working on cutting out pop - 3-4 cans per day, down from 5-6 cans per day.   She has 6 kids, 3-17 years old. 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   Often portioning out more food than she finds she can eat.     Recall Diet " Questions Reviewed With Patient 12/27/2021   Describe what you typically consume for breakfast (typical or most recent): Toast with butter and jelly   Describe what you typically consume for lunch (typical or most recent): 12-1pm: Chicken, many things at once, very big lunch; sandwich; cup noodles     Describe what you typically consume for supper (typical or most recent): 7-9 pm Not a big dinner, leftovers - baked chicken/fish with rice/mac/potato/zucchini/squash; take-out    Describe what you typically consume as snacks (typical or most recent): Chips, pickles, ice cream, yogurt, fruit    How many ounces of water, or other low calorie drinks, do you drink daily (8 oz=1 glass)? 64 oz or more  (water, sugar-free tea, vitamin water)   How many ounces of caffeine (coffee, tea, pop) do you drink daily (8 oz=1 glass)? 8 oz (occ while at work, couple sips)   How many ounces of carbonated (pop, beer, sparkling water) drinks do you drinky daily (8 oz=1 glass)? 8 oz   How many ounces of juice, pop, sweet tea, sports drinks, protein drinks, other sweetened drinks, do you drink daily (8 oz=1 glass)? 8 oz   How many ounces of milk do you drink daily (8 oz=1 glass) 8 oz   Please indicate the type of milk: 1%   How often do you drink alcohol? 2-4 times a month   If you do drink alcohol, how many drinks might you have in a day? (one drink = 5 oz. wine, 1 can/bottle of beer, 1 shot liquor) 3 or 4       Eating Habits 12/27/2021   Do you have any dietary restrictions? No   Do you currently binge eat (eat a large amount of food in a short time)? Yes - not recently, feeling puckett quickers   Are you an emotional eater? Yes - depressed    Do you get up to eat after falling asleep? Yes   What foods do you crave? 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     Dining Out History Reviewed With  Patient 12/27/2021   How often do you dine out? Around once a week.   Where do you dine out? (select all that apply) sit-down restaurants, fast food chains, take out   What types of food do you order when you dine out? Salad, chicken, burgers, pizza, tacos       Physical Activity Reviewed With Patient 12/27/2021   How often do you exercise? Daily   What is the duration of your exercise (in minutes)? 45 Minutes   What types of exercise do you do? walking, home gym   What keeps you from being more active?  I am as active as I can possbily be         NUTRITION DIAGNOSIS:  Obesity r/t long history of positive energy balance aeb BMI >30 kg/m2.    INTERVENTION:  Intervention Provided/Education Provided on post-op diet guidelines, vitamins/minerals essential post-operatively, GI anatomy of bariatric surgeries, ways to help prepare for post-op diet guidelines pre-operatively, portion/calorie-control, mindful eating and sources of protein. Discussed importance of establishing a consistent meal pattern, increasing lean protein and fruit/veggies portions at meals, while decreasing starch (Plate Method for structure), and discussed meal replacement criteria (protein shake/bar). Provided education on healthy, high-fiber sources of carb and appropriate serving sizes for mgmt of DM. Patient demonstrates understanding. Provided pt with list of goals RD contact information.      Questions Reviewed With Patient 12/27/2021   How ready are you to make changes regarding your weight? Number 1 = Not ready at all to make changes up to 10 = very ready. 10   How confident are you that you can change? 1 = Not confident that you will be successful making changes up to 10 = very confident. 10       Expected Engagement: good    GOALS:  1) Continue to work on eliminating pop - aim for limit 2-3 cans per day.   2) Eat 3 meals per day. Avoid snacking as able.  3) Use the Plate Method for meals (see below)   - Protein shake/bar as back up meal  4)  Eat slowly (20-30 minutes per meal), chewing foods well (25 chews per bite/applesauce consistency)  5) Do not drink with meals. Wait 30 mins after meal to drink again  6) Consume 64+ oz fluid per day. Small, frequent sips all day.    The Plate Method:  https://cdCircl.[facility].[place]/wp-content/uploads/sites/30/2012/09/NLNGrt3000.jpg    https://www.cdc.gov/diabetes/images/managing/Diabetes-Manage-Eat-Well-Plate-Graphic_600px.jpg    Protein Sources for Weight Loss  [email or web address]     Carbohydrates  [email or web address]     Mindful Eating  [email or web address]     Summary of Volumetrics Eating Plan  [email or web address]     Diet Guidelines after Weight Loss Surgery  [email or web address]     Seated Exercises for Arms and Legs (can be done before or after surgery)  [email or web address]    Meal Replacement Shake Options:   *Protein Shake Criteria: no more than 210 Calories, at least 20 grams of protein, and less than 10 grams of sugar    Integrated [employer]-Ch[employer]at[employer] [facility] smoothie (160 Calories, 20 g protein)   Premier Protein (160 Calories, 30 g protein)  Slim Fast Advanced Nutrition (180 Calories, 20 g protein)  Muscle Milk, lactose-free, 17 oz bottle (210 Calories, 30 g protein)  Integrated Supplements, no artificial sugars (110 Calories, 20 g protein)  Genepro, unflavored protein powder (60 Calories, 30 g protein)  Boost/Ensure Max (160 calories, 30 gm protein)   Fairlife Core Power (170 calories, 26 gm protein)    Meal Replacement Bar Options:  [facility] Protein Shake (160 Calories, 15 g protein)  Quest Protein Bars (190 Calories, 20 g protein)  Built Bar (170 Calories, 15-20 g protein)  One Protein Bar (210 calories, 20 g protein)  [place] Signature Protein Bar ([employer]) (190 Calories, 21 g protein)  Pure Protein Bars (180 Calories, 21 g protein)          Time spent with patient: 32 minutes.  Kanwal Hunter RD, LD

## 2022-01-20 NOTE — PATIENT INSTRUCTIONS
Darryn Lidnsey,    Follow-up with RD in 1 month.     Thank you,    Kanwal Hunter, RD, LD  If you would like to schedule or reschedule an appointment with the RD, please call [phone]    Nutrition Goals  1) Continue to work on eliminating pop - aim for limit 2-3 cans per day.   2) Eat 3 meals per day. Avoid snacking as able.  3) Use the Plate Method for meals (see below)   - Protein shake/bar as back up meal  4) Eat slowly (20-30 minutes per meal), chewing foods well (25 chews per bite/applesauce consistency)  5) Do not drink with meals. Wait 30 mins after meal to drink again  6) Consume 64+ oz fluid per day. Small, frequent sips all day.    The Plate Method:  https://Kreix.sph.[place].edu/wp-content/uploads/sites/30/2012/09/TTUWoe6600.jpg    https://www.cdc.gov/diabetes/images/managing/Diabetes-Manage-Eat-Well-Plate-Graphic_600px.jpg    Protein Sources for Weight Loss  [email or web address]     Carbohydrates  [email or web address]     Mindful Eating  [email or web address]     Summary of Volumetrics Eating Plan  [email or web address]     Diet Guidelines after Weight Loss Surgery  [email or web address]     Seated Exercises for Arms and Legs (can be done before or after surgery)  [email or web address]    Meal Replacement Shake Options:   *Protein Shake Criteria: no more than 210 Calories, at least 20 grams of protein, and less than 10 grams of sugar   [facility] smoothie (160 Calories, 20 g protein)   Premier Protein (160 Calories, 30 g protein)  Slim Fast Advanced Nutrition (180 Calories, 20 g protein)  Muscle Milk, lactose-free, 17 oz bottle (210 Calories, 30 g protein)  Integrated Supplements, no artificial sugars (110 Calories, 20 g protein)  Genepro, unflavored protein powder (60 Calories, 30 g protein)  Boost/Ensure Max (160 calories, 30 gm protein)   Fairlife Core Power (170 calories, 26 gm protein)    Meal Replacement Bar Options:  [facility] Protein Shake  (160 Calories, 15 g protein)  Quest Protein Bars (190 Calories, 20 g protein)  Built Bar (170 Calories, 15-20 g protein)  One Protein Bar (210 calories, 20 g protein)  Land Signature Protein Bar ([employer]) (190 Calories, 21 g protein)  Pure Protein Bars (180 Calories, 21 g protein)      Interested in working with a health ?  Health coaches work with you to improve your overall health and wellbeing.  They look at the whole person, and may involve discussion of different areas of life, including, but not limited to the four pillars of health (sleep, exercise, nutrition, and stress management). Discuss with your care team if you would like to start working a health .    Health Coaching-3 Pack:    $99 for three health coaching visits    Visits may be done in person or via phone    Coaching is a partnership between the  and the client; Coaches do not prescribe or diagnose    Coaching helps inspire the client to reach his/her personal goals      COMPREHENSIVE WEIGHT MANAGEMENT PROGRAM  VIRTUAL SUPPORT GROUPS    For Support Group Information:      We offer support groups for patients who are working on weight loss and considering, preparing for or have had weight loss surgery.   There is no cost for this opportunity.  You are invited to attend the?Virtual Support Groups?provided by any of the following locations:    1. [facility] via [employer] Teams with Katy Root RN  2.   [place] via [place] with Alhaji Blount, PhD, LP  3.   [place] via [place] with Jesica Banuelos, [employer]OCHILT  4.   [facility] via [employer] Teams with Jesica Granado [facility]    The following Support Group information can also be found on our website:  [email or web address]      [facility] Weight Loss Surgery Support Group    [facility] Weight Loss Surgery Support Group  The support group is a patient-lead forum that meets monthly to share  "experiences, encouragement and education. It is open to those who have had weight loss surgery, are scheduled for surgery, and those who are considering surgery.   WHEN: This group meets on the 3rd Wednesday of each month from 5:00PM - 6:00PM virtually using Microsoft Teams.   FACILITATOR: Led by Katy Root RD, LD, RN, the program's Clinical Coordinator.   TO REGISTER: Please contact the clinic via [employer] or call the nurse line directly at [phone] to inform our staff that you would like an invite sent to you and to let us know the email you would like the invite sent to. Prior to the meeting, a link with directions on how to join the meeting will be sent to you.    2022 Meetings  January 19: \"Let's Talk\" a time for the group to share.  February 16: \"Let's Talk\" a time for the group to share.  March 16: Guest Speakers: Psychologists, Meghan Quiñones, PhD,LP and Carmella Thornton PsyD,  April 20: Guest Speaker: Health , Tisha Puente, [facility],CHES, CPT  May 18: Guest Speaker: Dietitian, Marquez Monterroso RD, LP  Kimberley 15: \"Let's Talk\" a time for the group to share.  July 20: \"Let's Talk\" a time for the group to share.  August 17: TBA  September 21: TBA  October 19: Guest Speaker: Dr Nehemiah Figueroa MD Pulmonologist and Sleep Medicine Physician, \"Getting a Good Night's Sleep\".  November 16: TBA  December 21: TBA    [facility] Support Groups    Connections: Bariatric Care Support Group?  This is open to all [facility] (and those external to this program) pre- and post- operative bariatric surgery patients as well as their support system.   WHEN: This group meets the 2nd Tuesday of each month from 6:30 PM - 8:00 PM virtually using Microsoft Teams.   FACILITATOR: Led by Alhaji Blount, Ph.D who is a Licensed Psychologist with the [facility] Comprehensive Weight Management Program.   TO REGISTER: Please send an email to Alhaji Blount, Ph.D., LP " "[email or web address]?if you would like an invitation to the group and to learn about using Microsoft Teams.    2022 Meetings January 11: Shiloh Damon, PharmD, Pharmacy Resident at [facility], \"Medications and Bariatric Surgery\".  February 8: Open Forum  March 8  April 12  May 10  Kimberley 14    Connections: Post-Operative Bariatric Surgery Support Group  This is a support group for [facility] bariatric patients (and those external to [facility]) who have had bariatric surgery and are at least 3 months post-surgery.  WHEN: This support group meets the 4th Wednesday of the month from 11:00 AM - 12:00 PM virtually using Microsoft Teams.   FACILITATOR: Led by Certified Bariatric Nurse, Jesica Banuelos RN.   TO REGISTER: Please send an email to Jesica at [email or web address] if you would like an invitation to the group and to learn about using Microsoft Teams.    2022 Meetings  January 26  February 23  March 23  April 27  May 25  Kimberley 22    [facility] Healthy Lifestyle Virtual Support Group    Healthy Lifestyle Virtual Support Group?  This is 60 minutes of small group guided discussion, support and resources. All are welcome who want a healthy lifestyle.  WHEN: This group meets monthly on a Friday from 12:30 PM - 1:30 PM virtually using Microsoft Teams.   FACILITATOR: Led by National Board Certified Health and , Jesica Granado, [facility].   TO REGISTER: Please send an email to Jesica at?[email or web address] to receive monthly invites to the group or if you have any questions about having a health .  Prior to the meeting, a link with directions on how to join the meeting will be sent to you.    2022 Meetings January 21: Elinor Carlisle MS, RN, CIC, CBN, \"Healthy Habits\"  February 25: Open Forum  March 18: \"Setting Limits and Boundaries\"  April 29: Rianna Nicole RD, \"Meal Planning Made Easy\"  May 20: Open Forum  June: To be " determined

## 2022-02-18 ENCOUNTER — TELEPHONE (OUTPATIENT)
Dept: ENDOCRINOLOGY | Facility: CLINIC | Age: 37
End: 2022-02-18
Payer: COMMERCIAL

## 2022-02-18 NOTE — TELEPHONE ENCOUNTER
Patient requested phone call via [employer] message. Attempted to call number provided twice, no dial tone, no answer.

## 2022-03-14 ENCOUNTER — TELEPHONE (OUTPATIENT)
Dept: ENDOCRINOLOGY | Facility: CLINIC | Age: 37
End: 2022-03-14
Payer: COMMERCIAL

## 2022-03-14 NOTE — TELEPHONE ENCOUNTER
Pt wanted to talk to either Renetta or Diandra about a missed appt.    [phone]  **OK to leave detailed VM

## 2022-04-11 ENCOUNTER — OFFICE VISIT (OUTPATIENT)
Dept: ENDOCRINOLOGY | Facility: CLINIC | Age: 37
End: 2022-04-11
Payer: COMMERCIAL

## 2022-04-11 VITALS
HEART RATE: 94 BPM | BODY MASS INDEX: 40.85 KG/M2 | WEIGHT: 254.2 LBS | HEIGHT: 66 IN | SYSTOLIC BLOOD PRESSURE: 150 MMHG | DIASTOLIC BLOOD PRESSURE: 104 MMHG | OXYGEN SATURATION: 100 %

## 2022-04-11 DIAGNOSIS — G47.33 OSA (OBSTRUCTIVE SLEEP APNEA): Primary | ICD-10-CM

## 2022-04-11 DIAGNOSIS — E66.813 CLASS 3 SEVERE OBESITY DUE TO EXCESS CALORIES WITH SERIOUS COMORBIDITY AND BODY MASS INDEX (BMI) OF 40.0 TO 44.9 IN ADULT (H): ICD-10-CM

## 2022-04-11 DIAGNOSIS — E66.01 CLASS 3 SEVERE OBESITY DUE TO EXCESS CALORIES WITH SERIOUS COMORBIDITY AND BODY MASS INDEX (BMI) OF 40.0 TO 44.9 IN ADULT (H): ICD-10-CM

## 2022-04-11 PROCEDURE — 99214 OFFICE O/P EST MOD 30 MIN: CPT | Performed by: PHYSICIAN ASSISTANT

## 2022-04-11 RX ORDER — TOPIRAMATE 25 MG/1
TABLET, FILM COATED ORAL
Qty: 90 TABLET | Refills: 1 | Status: SHIPPED | OUTPATIENT
Start: 2022-04-11 | End: 2023-07-27

## 2022-04-11 ASSESSMENT — PAIN SCALES - GENERAL: PAINLEVEL: NO PAIN (0)

## 2022-04-11 NOTE — NURSING NOTE
"No chief complaint on file.      Vitals:    04/11/22 0945   BP: (!) 150/104   BP Location: Right leg   Patient Position: Chair   Cuff Size: Thigh   Pulse: 94   SpO2: 100%   Weight: 115.3 kg (254 lb 3.2 oz)   Height: 1.676 m (5' 6\")       Body mass index is 41.03 kg/m .                            "

## 2022-04-11 NOTE — LETTER
2022     RE: César Son  [address]  [place] [address]     Dear Colleague,    Thank you for referring your patient, César Son, to the [facility]IGHT [facility]NAGEMENT [facility] [place] [facility]. Please see a copy of my visit note below.      Pre-Bariatric Surgery Note    No primary care provider on file.    Date: 2022     RE: César Son    MR#: [id number]   : 1985   Date of Visit: 2022    REASON FOR VISIT: Preoperative evaluation for possible weight loss surgery    Dear  No primary care provider on file.,    I had the pleasure of seeing your patient, César Son, in my preoperative bariatric clinic.    As you know, she is morbidly obese and considering weight loss surgery to treat obesity in association with her medical conditions of obesity.  Her consult weight was 252.  She has gained 2 pounds since her consult weight. She has not met her required pre-surgery weight. Please refer to initial consult note from date 21 for patient's weight history and co-morbidities.    Recent hospitalizations for HTN and pancreatitis. Pancreatitis thought due to OTC diet pills. 3 total episodes now Dec 2021-2022. Last alcohol use Dec 2021.    Covid 2021    PreDM A1C 5.9    HTN: Needs PCP follow up. Takes 3 BP meds regularly    Back pain: worse with weight gain.  Takes muscle relaxers and dilaudid 2mg twice daily, prescribed by PCP [facility]    Needs new appt with therapist and psychiatrist    Sleep clinic clearance needed, sleep referral entered today    Assessment & Plan   Problem List Items Addressed This Visit    None        Start topiramate ramp to 75mg   Need to get letter from psychiatrist and therapist, set up new appts ASAP  Bariatric psych eval, call to schedule  Plan to meet Dr Slade after psych appts  RD soon and in 1 month  MTM Lauren Bloch 1 month phone visit to follow up  topiramate  Renetta 3 months return MWM video    Reviewed tasklist with patient yes    Most recent weights:  Wt Readings from Last 4 Encounters:   04/11/22 115.3 kg (254 lb 3.2 oz)   12/27/21 114.6 kg (252 lb 9.6 oz)         ROS    No past medical history on file.    No past surgical history on file.    Current Outpatient Medications   Medication     acetaminophen (TYLENOL) 325 MG tablet     albuterol (PROAIR HFA/PROVENTIL HFA/VENTOLIN HFA) 108 (90 Base) MCG/ACT inhaler     amLODIPine (NORVASC) 5 MG tablet     aspirin (ASA) 81 MG chewable tablet     blood glucose ([employer] BLOOD GLUCOSE TEST) test strip     buPROPion (WELLBUTRIN SR) 150 MG 12 hr tablet     Cholecalciferol (VITAMIN D-3) 125 MCG (5000 UT) TABS     cholecalciferol 50 MCG (2000 UT) CAPS     cyclobenzaprine (FLEXERIL) 5 MG tablet     HYDROmorphone (DILAUDID) 2 MG tablet     hydrOXYzine (VISTARIL) 50 MG capsule     ibuprofen (ADVIL/MOTRIN) 600 MG tablet     metFORMIN (GLUCOPHAGE) 1000 MG tablet     naproxen (NAPROSYN) 500 MG tablet     NIFEdipine ER (ADALAT CC) 30 MG 24 hr tablet     polyethylene glycol (MIRALAX) 17 GM/Dose powder     potassium chloride ER (KLOR-CON M) 20 MEQ CR tablet     senna (SENOKOT) 8.6 MG tablet     sertraline (ZOLOFT) 100 MG tablet     metroNIDAZOLE (FLAGYL) 500 MG tablet     No current facility-administered medications for this visit.       Allergies   Allergen Reactions     Oxycodone Rash     SUMMARY OF IMPORTANT IMAGING PERFORMED DURING HOSPITALIZATION:   - CT ABDOMEN PELVIS W 3/23/2022  FINDINGS:   LOWER CHEST: Stable 2 mm right lower lobe nodule series 3 image 9. Minimal basilar atelectasis.  HEPATOBILIARY: Stable. Density in the liver adjacent to the gallbladder fossa series 3 image 58 similar.  PANCREAS: Peripancreatic edema is prominent along the head and uncinate process.  SPLEEN: Normal.  ADRENAL GLANDS: Normal.  KIDNEYS/BLADDER: Normal.  BOWEL: Normal caliber. Normal appendix. Wall thickening duodenum likely secondary to  "adjacent pancreatitis.  LYMPH NODES: Normal.  VASCULATURE: Unremarkable.  PELVIC ORGANS: Septated cystic structure 3.9 x 2.4 cm left aspect of the vagina series 3 image 160.  MUSCULOSKELETAL: Normal.     IMPRESSION:   1.  Findings consistent with acute pancreatitis. No organized fluid collections.  2.  Septated cystic structure left vaginal region likely Bartholin gland cyst.      INDICATION: ABDOMINAL PAIN   COMPARISON: Ultrasound from 10/26/2021. CT from 10/23/2021.   TECHNIQUE: Limited abdominal ultrasound.     FINDINGS:     GALLBLADDER: Normal. No gallstones, wall thickening, or pericholecystic fluid. Negative sonographic Redd's sign.     BILE DUCTS: No biliary dilatation. The common duct measures 4 mm.     LIVER: Normal parenchyma with smooth contour. No focal mass.     RIGHT KIDNEY: Borderline pelviectasis.     PANCREAS: The visualized portions are normal.     No ascites.     IMPRESSION:   1.  Borderline right renal pelviectasis. Otherwise, unremarkable right upper quadrant ultrasound.        PHYSICAL EXAM:  Objective    BP (!) 150/104 (BP Location: Right leg, Patient Position: Chair, Cuff Size: Thigh)   Pulse 94   Ht 1.676 m (5' 6\")   Wt 115.3 kg (254 lb 3.2 oz)   SpO2 100%   BMI 41.03 kg/m    Body mass index is 41.03 kg/m .  Physical Exam   GENERAL: Healthy, alert and no distress  EYES: Eyes grossly normal to inspection.  No discharge or erythema, or obvious scleral/conjunctival abnormalities.  RESP: No audible wheeze, cough, or visible cyanosis.  No visible retractions or increased work of breathing.    SKIN: Visible skin clear. No significant rash, abnormal pigmentation or lesions.  NEURO: Cranial nerves grossly intact.  Mentation and speech appropriate for age.  PSYCH: Mentation appears normal, affect normal/bright, judgement and insight intact, normal speech and appearance well-groomed.      Sleeve Gastrectomy: Risks and Side Effects    The complications or risks of surgery include but are not " limited to: death, heart attack, infection in the surgical site (wound infection), abdomen (abscess), bladder (urinary tract infection), lungs (pneumonia), clots in legs (deep vein thrombosis) or lungs (pulmonary emboli),  injury to the bowels or other organs, bowel obstruction, hernia at the incision and gastrointestional bleeding.    More specific risks related to vertical sleeve gastrectomy were detailed at the bariatric informational seminar and include the following: leak at the vertical sleeve staple line, leak at the anastomoses,  nausea, vomiting, and dehydration for several months,  adhesions causing bowel obstruction, rapid weight loss causing a higher rate of gallstone formation during the first 6 months after surgery, decreased absorption of vitamins and protein because of the reduced stomach size, weight regain if inappropriate food intake occurs, stricture, injury to other organs, hernia,  and ulcers.       Side effects of bariatric surgery include but are not limited to: abdominal pain, cramping, bloating, constipation, nausea, vomiting, diarrhea, difficulty swallowing,  dehydration, hair loss, excess skin, protein, iron and vitamin deficiencies, heartburn, transfer of addictions, increased anxiety and worsening depression.       I emphasized exercise and activity behavior along with appropriate food choice as the main foundation for weight loss with surgery providing surgical reinforcement of the appropriate behavior set.        Review of general surgery weight loss process    1. Complete preoperative requirements, including weight loss.  Final weight check to confirm MANDATORY weight loss requirement must be documented on a clinic scale.    2. Discuss prior authorization with .    3. History and physical evaluation by PCP of PAC clinic within 30 days of surgery date, preoperative class, and weight check (weigh-in visit) to be scheduled by patient.  Pre-anesthesia clinic for risk  evaluation to be scheduled by anesthesia clinic.    4. We cannot guarantee that patient will qualify for surgery unless all preoperative requirements are met, prior authorization from primary insurance company is granted, and insurance changes do not occur.    5. It is possible for patients to regain all weight after weight loss surgery unless they follow guidelines prescribed by our bariatric center.    6. All patients with gastrointestinal complaints after weight loss surgery must have complaints conveyed to the bariatric team for appropriate treatment.    7. Vitamin deficiencies may develop post-bariatric surgery and annual laboratory testing should be performed.    8. Persistent nausea/vomiting after bariatric surgery entails risk of thiamine deficiency and should be treated early.  Vitamin B12 deficiency may develop, especially after gastric bypass surgery and must be recognized.        If you have any questions about our plans please don't hesitate to contact me.    Sincerely,    Renetta Arora PA-C      30 minutes spent on the date of the encounter doing chart review, history and exam, documentation and further activities per the note        Again, thank you for allowing me to participate in the care of your patient.      Sincerely,    Renetta Arora PA-C

## 2022-04-11 NOTE — PATIENT INSTRUCTIONS
Start topiramate ramp to 75mg   Need to get letter from psychiatrist and therapist, set up new appts ASAP  Bariatric psych eval, call to schedule  Plan to meet Dr Slade after psych appts  RD soon and in 1 month  MTM Lauren Bloch 1 month phone visit to follow up topiramate  Renetta 3 months return MWM video        TOPIRAMATE (TOPAMAX)    We are considering starting topiramate at bedtime.  Start one tab, 25 mg, for a week. Go up to 50 mg (2 tabs) for the next week. At the third week, take  3 tabs (75 mg).  Stay at 3 tabs until you are seen again.       Topiramate (Topamax) is a medication that is used most often to treat migraine headaches or for seizures. It has also been found to help with weight loss. Although it's not currently FDA approved for weight loss, it has been used safely for a number of years to help people who are carrying extra weight.     Just how topiramate helps with weight loss has not been exactly determined. However it seems to work on areas of the brain to quiet down signals related to eating.      Topiramate may make you:    >feel less interest in eating in between meals   >think less about food and eating   >find it easier to push the plate away   >find giving up pop easier    >have an easier time eating less    For some of our patients, the pills work right away. They feel and think quite differently about food. Other patients don't feel much of a change but find in fact they have lost weight! Like all weight loss medications, topiramate works best when you help it work.  This means:    1) Have less tempting high calorie (fattening) food around the house or office    2) Have lower calorie food (fruits, vegetables,low fat meats and dairy) for snacks    3) Eat out only one time or less each week.   4) Eat your meals at a table with the TV or computer off.    Side-effects. Topiramate is generally well tolerated. The main side-effects we see are:   Tingling in hands,feet, or face (usually not  very troublesome)   Mental confusion and word finding trouble (about 10% of patients have this.)     Feeling sleepy or a bit dopey- this goes away very soon after starting.    One of the dangers of topiramate is the possibility of birth defects--if you get pregnant when you are on it, there is the risk that your baby will be born with a cleft lip or palate.  If you are on topiramate and of child bearing age, you need to be on a reliable form of birth control or refrain from sexual intercourse.     Please refer to the pharmacy insert for more information on side-effects. Since many pharmacists are not familiar with the use of topiramate in weight loss, calling the clinic will get you the most accurate information on the use of this medication for weight loss.    For any questions or concerns please send a [employer] message to our team or call our weight management call center at [phone] during regular business hours. For questions during evenings or weekends your messages will be addressed during the next business day.  For emergencies please call 911 or seek immediate medical care.     (Do not stop taking it if you don't think it's working. For some people it works even though they do not feel much different.)     In order to get refills of this or any medication we prescribe you must be seen in the medical weight mgmt clinic every 3-6 months. Please have your pharmacy fax a refill request to [phone].    Bariatric Task List    Fax:  Please fax all paperwork to: [phone] -     Status:  Is patient a candidate for bariatric surgery?:    -     Cleared to schedule surgeon consult?:    -     Status:  surgery evaluation in process -     Surgeon: Yany -     Tentative surgery month/year: TBD -        Insurance: Insurance:  [employer] [place] -      Contact insurance to discuss coverage:   -       [employer]: PCP Recommendation and Medical Clearance:    -     HP Referral:    -      Advanced beneficiary notification (ABN)  for Medicare patients for RD visits   and surgery:   -      Weight history:   -     Other:    -        Patient Info: Initial Weight:  252 -     Date of Initial Weight/Height:  12/27/2021 -     Goal Weight (lbs):  242 -     Required Weight Loss:  10 -     Surgery Type:  sleeve gastrectomy -     Multidisciplinary Meeting:    -        Dietician Visits: Structured weight loss required by insurance?:  structured weight loss required -     Dietician Visit 1:  Completed -     Dietician Visit 2:  Completed -     Dietician Visit 3:  Needed -     Dietician Visit 4:  Needed -     Dietician Visit 5:  Needed -     Dietician Visit 6:  Needed -     Dietician Visit additional:  Needed -     Clearance from dietician to see surgeon?:    -     Dietician Notes:    -        Psychological Evaluation: Psych eval:  Needed -     Therapist letter of support:  Needed -     Psychiatrist letter of support:  Needed -     Establish care with therapist:  Needed -     Complete eating disorder evaluation:    -     Letter of clearance from therapist/eating disorder program:    -     Other:  establish with psychiatry -        Lab Work: Complete Blood Count:  Completed -     Comprehensive Metabolic Panel:  Completed -     Vitamin D:  Completed -     PTH:  Completed -     Hgb A1c:  Completed -      Lipids: Completed -      TSH ([facility] [place]):   -       Ferritin:   -       Folate:   -       Testosterone, Total and Free:   -     Thiamine:   -     Vitamin A:   -     Vitamin B12:   -     Zinc:   -     C-peptide:   -     H. pylori:    -     MRSA (2 swabs, minimum 48 hours apart):   -     Nicotine Testing:    -     Recheck Vitamin D:   -     Other:    -        Consults/ Clearance Sleep Medicine:  Needed -     Cardiac:    -     Pain:   -     Dental:    -     Endocrine:    -     Gastroenterology:    -     Vascular Medicine:    -     Hematology:    -     Medical Weight Management: Completed -     Physical Therapy/Exercise:    -     Nephrology:    -    "  Neurology:    -     Pulmonology:    -     Rheumatology:    -     Other:    -     Other:    -     Other:    -        Testing: UGI:    -     EGD:    -     Sleep Study:   -     Other:   -     Other:    -        PCP: Establish care with PCP:  Completed -     Follow up with PCP:    -     PCP letter of support:  Needed -        Stopping Smoking/ Alcohol Use: Quit tobacco use (3 months smoke free)?:    -     Quit date:    -     Quit alcohol use: Completed - Data deleted   Quit date:   -     Other:   -     Quit date:   -        Patient Education:  Information Session:  Completed -     Given \"Making your decision\" handout?:  Yes -     Given \"A Roadmap to you Weight Loss Surgery\" handout?: Yes -     Given \"Get Well Loop\" information?:   -     Given support group information?:    -     Attended support group?:    -     Support plan in place?:  Completed -     Research consents signed?:    -     Avoid NSAIDS/ Alternate Plan for Pain:   -        Additional Surgery Requirements: Review Coag plan:    -     HgA1c <8:    -     Inpatient pain consult:    -     Final nicotine screen:    -     Dental work complete:    -     Birth control plan:    -     Gallstone prevention plan (Actigall for 6 months postop):   -     Other:   -     Other:   -        Final Tasks:  Before surgery online class:  Needed -     Before surgery online class website link:  [email or web address]   After surgery online class:  Needed -     After surgery online class website link:  [email or web address]   Nurse visit per clinic:  Needed -     History and Physical per clinic:   -     Final labs per clinic: Needed -     Chest xray per clinic:   -     Electrocardiogram (ECG) per clinic:   -     Other:   -        Notes:   -              "

## 2022-04-11 NOTE — LETTER
"2022    Dear Primary Care Provider:    Thank you for coordinating with us to evaluate your patient, César Son, : 1985, for possible bariatric surgery. It is important to us that the primary care provider is aware of their patients' desire to have weight loss surgery and is supportive.    If you have not already submitted a referral with a clinic note indicating your support for this patient to have weight loss surgery, please enter a letter of support via Epic prior to the patient's next visit in our clinic.      Sample letter:    \"Dear [facility],    I am the primary care provider for (patient name) and I support (him/her) having weight loss surgery.    Sincerely,    (provider name)\"    If patient meets criteria and clearances for surgery, we will submit to the insurance company for insurance approval and coordinate the needed appointments with our department.    If you have any questions, feel free to contact us via our Call Center at [phone].  Please fax the evaluation to [phone] or route to Elinor Carlisle RN via Epic.    Sincerely,    MD Patric Gregorio MD Eric Wise, MD Kayla Gish, CNP,  Renetta Arora, LEAH    Mailing Address:  [facility]  [address], [facility] [address], [place] [address]  Website: [email or web address]              "

## 2022-04-11 NOTE — PROGRESS NOTES
Pre-Bariatric Surgery Note    No primary care provider on file.    Date: 2022     RE: César Son    MR#: [id number]   : 1985   Date of Visit: 2022    REASON FOR VISIT: Preoperative evaluation for possible weight loss surgery    Dear  No primary care provider on file.,    I had the pleasure of seeing your patient, César oSn, in my preoperative bariatric clinic.    As you know, she is morbidly obese and considering weight loss surgery to treat obesity in association with her medical conditions of obesity.  Her consult weight was 252.  She has gained 2 pounds since her consult weight. She has not met her required pre-surgery weight. Please refer to initial consult note from date 21 for patient's weight history and co-morbidities.    Recent hospitalizations for HTN and pancreatitis. Pancreatitis thought due to OTC diet pills. 3 total episodes now Dec 2021-2022. Last alcohol use Dec 2021.    Covid 2021    PreDM A1C 5.9    HTN: Needs PCP follow up. Takes 3 BP meds regularly    Back pain: worse with weight gain.  Takes muscle relaxers and dilaudid 2mg twice daily, prescribed by PCP [facility]    Needs new appt with therapist and psychiatrist    Sleep clinic clearance needed, sleep referral entered today    Assessment & Plan   Problem List Items Addressed This Visit    None        Start topiramate ramp to 75mg   Need to get letter from psychiatrist and therapist, set up new appts ASAP  Bariatric psych eval, call to schedule  Plan to meet Dr Slade after psych appts  RD soon and in 1 month  MTM Lauren Bloch 1 month phone visit to follow up topiramate  Renetta 3 months return MWM video    Reviewed tasklist with patient yes    Most recent weights:  Wt Readings from Last 4 Encounters:   22 115.3 kg (254 lb 3.2 oz)   21 114.6 kg (252 lb 9.6 oz)         ROS    No past medical history on file.    No past surgical history on file.    Current Outpatient  Medications   Medication     acetaminophen (TYLENOL) 325 MG tablet     albuterol (PROAIR HFA/PROVENTIL HFA/VENTOLIN HFA) 108 (90 Base) MCG/ACT inhaler     amLODIPine (NORVASC) 5 MG tablet     aspirin (ASA) 81 MG chewable tablet     blood glucose ([employer] BLOOD GLUCOSE TEST) test strip     buPROPion (WELLBUTRIN SR) 150 MG 12 hr tablet     Cholecalciferol (VITAMIN D-3) 125 MCG (5000 UT) TABS     cholecalciferol 50 MCG (2000 UT) CAPS     cyclobenzaprine (FLEXERIL) 5 MG tablet     HYDROmorphone (DILAUDID) 2 MG tablet     hydrOXYzine (VISTARIL) 50 MG capsule     ibuprofen (ADVIL/MOTRIN) 600 MG tablet     metFORMIN (GLUCOPHAGE) 1000 MG tablet     naproxen (NAPROSYN) 500 MG tablet     NIFEdipine ER (ADALAT CC) 30 MG 24 hr tablet     polyethylene glycol (MIRALAX) 17 GM/Dose powder     potassium chloride ER (KLOR-CON M) 20 MEQ CR tablet     senna (SENOKOT) 8.6 MG tablet     sertraline (ZOLOFT) 100 MG tablet     metroNIDAZOLE (FLAGYL) 500 MG tablet     No current facility-administered medications for this visit.       Allergies   Allergen Reactions     Oxycodone Rash     SUMMARY OF IMPORTANT IMAGING PERFORMED DURING HOSPITALIZATION:   - CT ABDOMEN PELVIS W 3/23/2022  FINDINGS:   LOWER CHEST: Stable 2 mm right lower lobe nodule series 3 image 9. Minimal basilar atelectasis.  HEPATOBILIARY: Stable. Density in the liver adjacent to the gallbladder fossa series 3 image 58 similar.  PANCREAS: Peripancreatic edema is prominent along the head and uncinate process.  SPLEEN: Normal.  ADRENAL GLANDS: Normal.  KIDNEYS/BLADDER: Normal.  BOWEL: Normal caliber. Normal appendix. Wall thickening duodenum likely secondary to adjacent pancreatitis.  LYMPH NODES: Normal.  VASCULATURE: Unremarkable.  PELVIC ORGANS: Septated cystic structure 3.9 x 2.4 cm left aspect of the vagina series 3 image 160.  MUSCULOSKELETAL: Normal.     IMPRESSION:   1.  Findings consistent with acute pancreatitis. No organized fluid collections.  2.  Septated cystic  "structure left vaginal region likely Bartholin gland cyst.      INDICATION: ABDOMINAL PAIN   COMPARISON: Ultrasound from 10/26/2021. CT from 10/23/2021.   TECHNIQUE: Limited abdominal ultrasound.     FINDINGS:     GALLBLADDER: Normal. No gallstones, wall thickening, or pericholecystic fluid. Negative sonographic Redd's sign.     BILE DUCTS: No biliary dilatation. The common duct measures 4 mm.     LIVER: Normal parenchyma with smooth contour. No focal mass.     RIGHT KIDNEY: Borderline pelviectasis.     PANCREAS: The visualized portions are normal.     No ascites.     IMPRESSION:   1.  Borderline right renal pelviectasis. Otherwise, unremarkable right upper quadrant ultrasound.        PHYSICAL EXAM:  Objective    BP (!) 150/104 (BP Location: Right leg, Patient Position: Chair, Cuff Size: Thigh)   Pulse 94   Ht 1.676 m (5' 6\")   Wt 115.3 kg (254 lb 3.2 oz)   SpO2 100%   BMI 41.03 kg/m    Body mass index is 41.03 kg/m .  Physical Exam   GENERAL: Healthy, alert and no distress  EYES: Eyes grossly normal to inspection.  No discharge or erythema, or obvious scleral/conjunctival abnormalities.  RESP: No audible wheeze, cough, or visible cyanosis.  No visible retractions or increased work of breathing.    SKIN: Visible skin clear. No significant rash, abnormal pigmentation or lesions.  NEURO: Cranial nerves grossly intact.  Mentation and speech appropriate for age.  PSYCH: Mentation appears normal, affect normal/bright, judgement and insight intact, normal speech and appearance well-groomed.      Sleeve Gastrectomy: Risks and Side Effects    The complications or risks of surgery include but are not limited to: death, heart attack, infection in the surgical site (wound infection), abdomen (abscess), bladder (urinary tract infection), lungs (pneumonia), clots in legs (deep vein thrombosis) or lungs (pulmonary emboli),  injury to the bowels or other organs, bowel obstruction, hernia at the incision and " gastrointestional bleeding.    More specific risks related to vertical sleeve gastrectomy were detailed at the bariatric informational seminar and include the following: leak at the vertical sleeve staple line, leak at the anastomoses,  nausea, vomiting, and dehydration for several months,  adhesions causing bowel obstruction, rapid weight loss causing a higher rate of gallstone formation during the first 6 months after surgery, decreased absorption of vitamins and protein because of the reduced stomach size, weight regain if inappropriate food intake occurs, stricture, injury to other organs, hernia,  and ulcers.       Side effects of bariatric surgery include but are not limited to: abdominal pain, cramping, bloating, constipation, nausea, vomiting, diarrhea, difficulty swallowing,  dehydration, hair loss, excess skin, protein, iron and vitamin deficiencies, heartburn, transfer of addictions, increased anxiety and worsening depression.       I emphasized exercise and activity behavior along with appropriate food choice as the main foundation for weight loss with surgery providing surgical reinforcement of the appropriate behavior set.        Review of general surgery weight loss process    1. Complete preoperative requirements, including weight loss.  Final weight check to confirm MANDATORY weight loss requirement must be documented on a clinic scale.    2. Discuss prior authorization with .    3. History and physical evaluation by PCP of PAC clinic within 30 days of surgery date, preoperative class, and weight check (weigh-in visit) to be scheduled by patient.  Pre-anesthesia clinic for risk evaluation to be scheduled by anesthesia clinic.    4. We cannot guarantee that patient will qualify for surgery unless all preoperative requirements are met, prior authorization from primary insurance company is granted, and insurance changes do not occur.    5. It is possible for patients to regain all  weight after weight loss surgery unless they follow guidelines prescribed by our bariatric center.    6. All patients with gastrointestinal complaints after weight loss surgery must have complaints conveyed to the bariatric team for appropriate treatment.    7. Vitamin deficiencies may develop post-bariatric surgery and annual laboratory testing should be performed.    8. Persistent nausea/vomiting after bariatric surgery entails risk of thiamine deficiency and should be treated early.  Vitamin B12 deficiency may develop, especially after gastric bypass surgery and must be recognized.        If you have any questions about our plans please don't hesitate to contact me.    Sincerely,    Renetta Arora PA-C      30 minutes spent on the date of the encounter doing chart review, history and exam, documentation and further activities per the note

## 2022-05-17 ENCOUNTER — CARE COORDINATION (OUTPATIENT)
Dept: ENDOCRINOLOGY | Facility: CLINIC | Age: 37
End: 2022-05-17
Payer: COMMERCIAL

## 2022-05-17 NOTE — PROGRESS NOTES
Reviewed tasklist with patient over the phone.Tasklist updated and sent to patient via [employer].  Sent letters for providers and NBS pt ed materials via [employer].    Bariatric Task List  Fax:  Please fax all paperwork to: [phone] -     Status:  Is patient a candidate for bariatric surgery?:    -     Cleared to schedule surgeon consult?:    -     Status:  surgery evaluation in process -     Surgeon: Yany -     Tentative surgery month/year: TBD -        Insurance: Insurance: [facility]       Patient Info: Initial Weight:  252 -     Date of Initial Weight/Height:  12/27/2021 -     Goal Weight (lbs):  242 -     Required Weight Loss:  10 -     Surgery Type:  sleeve gastrectomy -        Dietician Visits: Structured weight loss required by insurance?:  structured weight loss required -     Dietician Visit 1:  Completed - 1/20/22. s   Dietician Visit 2:  Needed - Call [phone] to schedule. bks   Dietician Visit 3:  Needed - Call [phone] to schedule. s   Dietician Visit 4:    -     Dietician Visit 5:    -     Dietician Visit 6:    -     Dietician Visit additional:  Needed - Monthly until surgery for weight loss and postop diet teaching. [facility]      Psychological Evaluation: Psych eval:  Needed -     Therapist letter of support:  Needed -     Psychiatrist letter of support:  Needed -     Establish care with therapist:  Needed -     Complete eating disorder evaluation:    -     Other:  establish with psychiatry -        Lab Work: Complete Blood Count:  Completed -     Comprehensive Metabolic Panel:  Completed -     Vitamin D:  Completed -     PTH:  Completed -     Hgb A1c:  Completed -      Lipids: Completed -      TSH ([facility] [place]): Needed -        Consults/ Clearance Sleep Medicine:  Needed - Call [phone] to schedule consult. [facility]   Medical Weight Management: Completed -        Testing: Sleep Study:   -  If requested by sleep provider. [facility]       PCP: Establish care with PCP:  Completed -     PCP letter of  "support:  Needed -        Stopping Smoking/ Alcohol Use: Quit alcohol use: Completed       Patient Education:  Information Session:  Completed -     Given \"Making your decision\" handout?:  Yes -     Given \"A Roadmap to you Weight Loss Surgery\" handout?: Yes -     Given \"Get Well Loop\" information?: Yes -     Given support group information?:    -     Attended support group?:    -     Support plan in place?:  Completed -        Final Tasks:  Before surgery online class:  Needed -     Before surgery online class website link:  [email or web address]   After surgery online class:  Needed -     After surgery online class website link:  [email or web address]   Nurse visit per clinic:  Needed -     History and Physical per clinic:   -  Pre-Assessment Clinic   Final labs per clinic: Needed -        Notes:   -            "

## 2022-05-22 ENCOUNTER — HEALTH MAINTENANCE LETTER (OUTPATIENT)
Age: 37
End: 2022-05-22

## 2022-06-21 ENCOUNTER — TELEPHONE (OUTPATIENT)
Dept: SURGERY | Facility: CLINIC | Age: 37
End: 2022-06-21

## 2022-06-22 ENCOUNTER — TELEPHONE (OUTPATIENT)
Dept: ENDOCRINOLOGY | Facility: CLINIC | Age: 37
End: 2022-06-22

## 2022-06-27 ENCOUNTER — TELEPHONE (OUTPATIENT)
Dept: SURGERY | Facility: CLINIC | Age: 37
End: 2022-06-27

## 2022-06-27 NOTE — TELEPHONE ENCOUNTER
Left message for patient to see dietitian as scheduled on 6/30/22 and to call [phone] to schedule sleep medicine appointment. And, to review tasklist and letters sent to her via [employer] last week.

## 2022-06-27 NOTE — TELEPHONE ENCOUNTER
Pt states she keeps receiving notification of a certified letter/ package from clinic. She has missed the  and is wondering what is being sent to her      [phone]

## 2022-06-27 NOTE — TELEPHONE ENCOUNTER
Encouraged patient to call number on the notification to see which post pffice the letter is at for her to .

## 2022-06-30 ENCOUNTER — VIRTUAL VISIT (OUTPATIENT)
Dept: ENDOCRINOLOGY | Facility: CLINIC | Age: 37
End: 2022-06-30
Payer: COMMERCIAL

## 2022-06-30 DIAGNOSIS — E11.69 DIABETES MELLITUS TYPE 2 IN OBESE: ICD-10-CM

## 2022-06-30 DIAGNOSIS — E66.9 OBESITY: ICD-10-CM

## 2022-06-30 DIAGNOSIS — Z71.3 NUTRITIONAL COUNSELING: Primary | ICD-10-CM

## 2022-06-30 DIAGNOSIS — E66.9 DIABETES MELLITUS TYPE 2 IN OBESE: ICD-10-CM

## 2022-06-30 PROCEDURE — 97803 MED NUTRITION INDIV SUBSEQ: CPT | Mod: 95 | Performed by: DIETITIAN, REGISTERED

## 2022-06-30 NOTE — PROGRESS NOTES
"César Son is a 36 year old female who is being evaluated via a billable telephone visit. Could not get video connected on [employer] or [employer], converted visit to phone.     The patient has been notified of following:     \"This telephone visit will be conducted via a call between you and your physician/provider. We have found that certain health care needs can be provided without the need for a physical exam.  This service lets us provide the care you need with a short phone conversation.  If a prescription is necessary we can send it directly to your pharmacy.  If lab work is needed we can place an order for that and you can then stop by our lab to have the test done at a later time.    Telephone visits are billed at different rates depending on your insurance coverage. During this emergency period, for some insurers they may be billed the same as an in-person visit.  Please reach out to your insurance provider with any questions.    If during the course of the call the physician/provider feels a telephone visit is not appropriate, you will not be charged for this service.\"    Patient has given verbal consent for Telephone visit?  Yes    How would you like to obtain your AVS? MyChart    Phone call duration: 17 minutes    During this virtual visit the patient is located in [place], patient verifies this as the location during the entirety of this visit.       Bariatric Nutrition Consultation Note    Reason For Visit: Nutrition Assessment    César Son is a 36 year old presenting today for return bariatric nutrition consult.   Pt is interested in laparoscopic sleeve gastrectomy with Dr. Slade.  Patient is accompanied by self.  This is pt's 2nd of 6 required nutrition visits prior to surgery.     Pt referred by DILLON Vu on January 18, 2022.  Patient with Co-morbidities of obesity including:  Type II DM yes  Renal Failure no  Sleep apnea yes  Hypertension yes   Dyslipidemia no  Joint pain yes  Back pain " "yes  GERD no     Support System Reviewed With Patient 12/27/2021   Who do you have in your support network that can be available to help you for the first 2 weeks after surgery? Kid's father, kids   Who can you count on for support throughout your weight loss surgery journey? kid's father, kids       ANTHROPOMETRICS:  Estimated body mass index is 40.77 kg/m  as calculated from the following:    Height as of 12/27/21: 1.676 m (5' 6\").    Weight as of 12/27/21: 114.6 kg (252 lb 9.6 oz).    Current weight: unkown (She does not remember what it was when last checked, and states recently checked at [facility], do not have access to this info. Encouraged she send wt check via [employer], pt agreeable)    Required weight loss goal pre-op: 10 lbs from initial consult weight (goal weight 242 lbs or less before surgery)       12/27/2021   I have tried the following methods to lose weight Watching portions or calories, Exercise, Slimfast, OTC Medications       Weight Loss Questions Reviewed With Patient 12/27/2021   How long have you been overweight? Since late teens through early 20's       SUPPLEMENT INFORMATION:  Vitamin D    NUTRITION HISTORY:  NKFA. Does not tolerate greasy foods, example pt provided - ground beef  Previous experiences with loss: diet gummies (just made her go to the bathroom more), increased exercise.   Usually not having breakfast, and going a while until eating full meal  Lunch is biggest meal.  She describes having new early satiety at meals in past couple months. Not feeling that hungry will eat whatever.  Working on cutting out pop - 3-4 cans per day, down from 5-6 cans per day.   She has 6 kids, 3-17 years old. 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   Often portioning out more food than she finds she can eat.     Today - Continues to wean pop intake. Practicing portion control and increasing fruit intake.     Diet Recall:  skips or toast/apple   3-4 pm 1 sandwich (turkey/ham + " light parekh on honey wheat) with fruit cup   Beverages: water     Progress Towards Weight Loss:   1) Continue to work on eliminating pop - aim for limit 2-3 cans per day. - Improving, limiting to 1 can per day  2) Eat 3 meals per day. Avoid snacking as able. - Met, continues  3) Use the Plate Method for meals (see below) - Improving    - Protein shake/bar as back up meal  4) Eat slowly (20-30 minutes per meal), chewing foods well (25 chews per bite/applesauce consistency) - Improving   5) Do not drink with meals. Wait 30 mins after meal to drink again - Not met, forgot about this goal  6) Consume 64+ oz fluid per day. Small, frequent sips all day. - Improving    Eating Habits 12/27/2021   Do you have any dietary restrictions? No   Do you currently binge eat (eat a large amount of food in a short time)? Yes - not recently, feeling puckett quickers   Are you an emotional eater? Yes - depressed    Do you get up to eat after falling asleep? Yes   What foods do you crave? 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     Dining Out History Reviewed With Patient 12/27/2021   How often do you dine out? Around once a week.   Where do you dine out? (select all that apply) sit-down restaurants, fast food chains, take out   What types of food do you order when you dine out? Salad, chicken, burgers, pizza, tacos       Physical Activity Reviewed With Patient 12/27/2021   How often do you exercise? Daily   What is the duration of your exercise (in minutes)? 45 Minutes   What types of exercise do you do? walking, home gym   What keeps you from being more active?  I am as active as I can possbily be         NUTRITION DIAGNOSIS:  Obesity r/t long history of positive energy balance aeb BMI >30 kg/m2.    INTERVENTION:  Intervention Provided/Education Provided:  Reviewed post-op diet  guidelines, ways to help prepare for post-op diet guidelines pre-operatively, portion/calorie-control, mindful eating and sources of protein.   Discussed importance of establishing a consistent meal pattern, increasing lean protein first, and then fruit/veggies portions at meals.  Provided education on healthy, high-fiber sources of carb and appropriate serving sizes for mgmt of DM.   Reviewed surgery task list.  Patient demonstrates understanding. Provided pt with list of goals RD contact information.      Questions Reviewed With Patient 12/27/2021   How ready are you to make changes regarding your weight? Number 1 = Not ready at all to make changes up to 10 = very ready. 10   How confident are you that you can change? 1 = Not confident that you will be successful making changes up to 10 = very confident. 10       Expected Engagement: good     Follow-up: Scheduled for 8/1 with pt. Attempted to schedule July appt, however provider schedule completely full.    GOALS:  1) Continue to work on eliminating pop  2) Consume 3 servings of lean protein daily   - Lean protein sources: chicken/turkey without skin, fish, pork tenderloin, beef sirloin, shellfish, eggs, beans, lentils, tofu, tempeh, low-fat yogurt/cottage cheese, protein bar/shake (see list below for more)  3) Eat slowly (20-30 minutes per meal), chewing foods well (25 chews per bite/applesauce consistency)  4) Do not drink with meals. Wait 30 mins after meal to drink again  5) Consume 64+ oz fluid per day. Small, frequent sips all day.  6) Send wt check via [employer] message    The Plate Method:  https://cdn1.sph.[employer].edu/wp-content/uploads/sites/30/2012/09/UGQIrg1723.jpg    https://www.cdc.gov/diabetes/images/managing/Diabetes-Manage-Eat-Well-Plate-Graphic_600px.jpg    Protein Sources for Weight Loss  [email or web address]     Carbohydrates  [email or web address]     Mindful Eating  [email or web address]     Summary of Volumetrics  Eating Plan  [email or web address]     Diet Guidelines after Weight Loss Surgery  [email or web address]     Seated Exercises for Arms and Legs (can be done before or after surgery)  [email or web address]    Meal Replacement Shake Options:   *Protein Shake Criteria: no more than 210 Calories, at least 20 grams of protein, and less than 10 grams of sugar   [facility] smoothie (160 Calories, 20 g protein)   Premier Protein (160 Calories, 30 g protein)  Slim Fast Advanced Nutrition (180 Calories, 20 g protein)  Muscle Milk, lactose-free, 17 oz bottle (210 Calories, 30 g protein)  Integrated Supplements, no artificial sugars (110 Calories, 20 g protein)  Genepro, unflavored protein powder (60 Calories, 30 g protein)  Boost/Ensure Max (160 calories, 30 gm protein)   Fairlife Core Power (170 calories, 26 gm protein)    Meal Replacement Bar Options:  [facility] Protein Shake (160 Calories, 15 g protein)  Quest Protein Bars (190 Calories, 20 g protein)  Built Bar (170 Calories, 15-20 g protein)  One Protein Bar (210 calories, 20 g protein)  Elsa Signature Protein Bar (Costco) (190 Calories, 21 g protein)  Pure Protein Bars (180 Calories, 21 g protein)      Time spent with patient: 17 minutes.  Kanwal Hunter, ABBY, LD

## 2022-06-30 NOTE — LETTER
Date:June 30, 2022      Patient was self referred, no letter generated. Do not send.        [facility] Information

## 2022-06-30 NOTE — PATIENT INSTRUCTIONS
Darryn Lindsey,    Follow-up with RD on 8/1    Thank you,    Kanwal Hunter, RD, LD  If you would like to schedule or reschedule an appointment with the RD, please call [phone]    Nutrition Goals  1) Continue to work on eliminating pop  2) Consume 3 servings of lean protein daily   - Lean protein sources: chicken/turkey without skin, fish, pork tenderloin, beef sirloin, shellfish, eggs, beans, lentils, tofu, tempeh, low-fat yogurt/cottage cheese, protein bar/shake (see list below for more)  3) Eat slowly (20-30 minutes per meal), chewing foods well (25 chews per bite/applesauce consistency)  4) Do not drink with meals. Wait 30 mins after meal to drink again  5) Consume 64+ oz fluid per day. Small, frequent sips all day.  6) Send wt check via [employer] message    The Plate Method:  https://[employer].[facility].[employer]/wp-content/uploads/sites/30/2012/09/TOSRaa8104.jpg    https://www.cdc.gov/diabetes/images/managing/Diabetes-Manage-Eat-Well-Plate-Graphic_600px.jpg    Protein Sources for Weight Loss  [email or web address]     Carbohydrates  [email or web address]     Mindful Eating  [email or web address]     Summary of Volumetrics Eating Plan  [email or web address]     Diet Guidelines after Weight Loss Surgery  [email or web address]     Seated Exercises for Arms and Legs (can be done before or after surgery)  [email or web address]    Meal Replacement Shake Options:   *Protein Shake Criteria: no more than 210 Calories, at least 20 grams of protein, and less than 10 grams of sugar   [facility] smoothie (160 Calories, 20 g protein)   Premier Protein (160 Calories, 30 g protein)  Slim Fast Advanced Nutrition (180 Calories, 20 g protein)  Muscle Milk, lactose-free, 17 oz bottle (210 Calories, 30 g protein)  Integrated Supplements, no artificial sugars (110 Calories, 20 g protein)  Genepro, unflavored protein powder (60 Calories, 30 g protein)  Boost/Ensure Max (160 calories, 30 gm  protein)   Fairlife Core Power (170 calories, 26 gm protein)    Meal Replacement Bar Options:  [facility] Protein Shake (160 Calories, 15 g protein)  Quest Protein Bars (190 Calories, 20 g protein)  Built Bar (170 Calories, 15-20 g protein)  One Protein Bar (210 calories, 20 g protein)  [place] Signature Protein Bar ([employer]co) (190 Calories, 21 g protein)  Pure Protein Bars (180 Calories, 21 g protein)    Interested in working with a health ?  Health coaches work with you to improve your overall health and wellbeing.  They look at the whole person, and may involve discussion of different areas of life, including, but not limited to the four pillars of health (sleep, exercise, nutrition, and stress management). Discuss with your care team if you would like to start working a health .    Health Coaching-3 Pack:    $99 for three health coaching visits    Visits may be done in person or via phone    Coaching is a partnership between the  and the client; Coaches do not prescribe or diagnose    Coaching helps inspire the client to reach his/her personal goals      COMPREHENSIVE WEIGHT MANAGEMENT PROGRAM  VIRTUAL SUPPORT GROUPS    For Support Group Information:      We offer support groups for patients who are working on weight loss and considering, preparing for or have had weight loss surgery.   There is no cost for this opportunity.  You are invited to attend the?Virtual Support Groups?provided by any of the following locations:    [facility] via [employer] Teams with Katy Root RN  2.   [place] via [employer].com Teams with Alhaji Blount, PhD, LP  3.   [place] via [employer] Teams with Jesica Banuelos RN  4.   [facility] via [employer].com Teams with Jesica Granado [facility]    The following Support Group information can also be found on our website:  [email or web address]      [facility] Weight Loss Surgery Support Group    [place]  "[facility] Weight Loss Surgery Support Group  The support group is a patient-lead forum that meets monthly to share experiences, encouragement and education. It is open to those who have had weight loss surgery, are scheduled for surgery, and those who are considering surgery.   WHEN: This group meets on the 3rd Wednesday of each month from 5:00PM - 6:00PM virtually using Microsoft Teams.   FACILITATOR: Led by Katy Root RD, LD, RN, the program's Clinical Coordinator.   TO REGISTER: Please contact the clinic via [employer] or call the nurse line directly at [phone] to inform our staff that you would like an invite sent to you and to let us know the email you would like the invite sent to. Prior to the meeting, a link with directions on how to join the meeting will be sent to you.    2022 Meetings  January 19: \"Let's Talk\" a time for the group to share.  February 16: \"Let's Talk\" a time for the group to share.  March 16: Guest Speakers: Psychologists, Meghan Quiñones, PhD,LP and Carmella Thornton PsyD,  April 20: Guest Speaker: Health Tisha, [facility],CHES, CPT  May 18: Guest Speaker: DietitianMarquez, ABBY, LP  Kimberley 15: \"Let's Talk\" a time for the group to share.  July 20: \"Let's Talk\" a time for the group to share.  August 17: TBA  September 21: TBA  October 19: Guest Speaker: Dr Nehemiah Figueroa MD Pulmonologist and Sleep Medicine Physician, \"Getting a Good Night's Sleep\".  November 16: TBA  December 21: TBA    [facility] Support Groups    Connections: Bariatric Care Support Group?  This is open to all [facility] (and those external to this program) pre- and post- operative bariatric surgery patients as well as their support system.   WHEN: This group meets the 2nd Tuesday of each month from 6:30 PM - 8:00 PM virtually using Microsoft Teams.   FACILITATOR: Led by Alhaji Blount, Ph.D who is a Licensed Psychologist with the [facility] " "Weight Management Program.   TO REGISTER: Please send an email to Alhaji Blount, Ph.D., LP at?[email or web address]?if you would like an invitation to the group and to learn about using Microsoft Teams.    2022 Meetings January 11: Shiloh Damon, PharmD, Pharmacy Resident at [facility], \"Medications and Bariatric Surgery\".  February 8: Open Forum  March 8  April 12  May 10  Kimberley 14    Connections: Post-Operative Bariatric Surgery Support Group  This is a support group for [facility] bariatric patients (and those external to [facility]) who have had bariatric surgery and are at least 3 months post-surgery.  WHEN: This support group meets the 4th Wednesday of the month from 11:00 AM - 12:00 PM virtually using Microsoft Teams.   FACILITATOR: Led by Certified Bariatric Nurse, Jesica Banuelos RN.   TO REGISTER: Please send an email to Jesica at [email or web address] if you would like an invitation to the group and to learn about using Microsoft Teams.    2022 Meetings  January 26  February 23  March 23  April 27  May 25  Kimberley 22    [facility] Healthy Lifestyle Virtual Support Group    Healthy Lifestyle Virtual Support Group?  This is 60 minutes of small group guided discussion, support and resources. All are welcome who want a healthy lifestyle.  WHEN: This group meets monthly on a Friday from 12:30 PM - 1:30 PM virtually using Microsoft Teams.   FACILITATOR: Led by National Board Certified Health and , Jesica Granado [facility], [facility].   TO REGISTER: Please send an email to Jesica [email or web address] to receive monthly invites to the group or if you have any questions about having a health .  Prior to the meeting, a link with directions on how to join the meeting will be sent to you.    2022 Meetings  MAY 20: OPEN FORUM  JUNE: 24:  Setting Limits and BoundariesJesica  July 29: OPEN FORUM  August 26: Special Guest Registered Dietician Leanne " Emile  Sept 30: OPEN FORUM  Oct 28, Gratitude Ludy Owen National Board Certified Health   Nov 18: Navigating How to Eat Around the Holidays with ABBY Nicole  Dec 16: Changing your relationship with movement with  Tisha National Board Certified Health

## 2022-06-30 NOTE — LETTER
"6/30/2022       RE: César Son  [address]  [place] [address]     Dear Colleague,    Thank you for referring your patient, César Son, to the [facility] [place] at [facility]. Please see a copy of my visit note below.    César Son is a 36 year old female who is being evaluated via a billable telephone visit. Could not get video connected on [employer] or [employer], converted visit to phone.     The patient has been notified of following:     \"This telephone visit will be conducted via a call between you and your physician/provider. We have found that certain health care needs can be provided without the need for a physical exam.  This service lets us provide the care you need with a short phone conversation.  If a prescription is necessary we can send it directly to your pharmacy.  If lab work is needed we can place an order for that and you can then stop by our lab to have the test done at a later time.    Telephone visits are billed at different rates depending on your insurance coverage. During this emergency period, for some insurers they may be billed the same as an in-person visit.  Please reach out to your insurance provider with any questions.    If during the course of the call the physician/provider feels a telephone visit is not appropriate, you will not be charged for this service.\"    Patient has given verbal consent for Telephone visit?  Yes    How would you like to obtain your AVS? Solt    Phone call duration: 17 minutes    During this virtual visit the patient is located in [place], patient verifies this as the location during the entirety of this visit.       Bariatric Nutrition Consultation Note    Reason For Visit: Nutrition Assessment    César Son is a 36 year old presenting today for return bariatric nutrition consult.   Pt is interested in laparoscopic sleeve gastrectomy with Dr." "Yany.  Patient is accompanied by self.  This is pt's 2nd of 6 required nutrition visits prior to surgery.     Pt referred by DILLON Vu on January 18, 2022.  Patient with Co-morbidities of obesity including:  Type II DM yes  Renal Failure no  Sleep apnea yes  Hypertension yes   Dyslipidemia no  Joint pain yes  Back pain yes  GERD no     Support System Reviewed With Patient 12/27/2021   Who do you have in your support network that can be available to help you for the first 2 weeks after surgery? Kid's father, kids   Who can you count on for support throughout your weight loss surgery journey? kid's father, kids       ANTHROPOMETRICS:  Estimated body mass index is 40.77 kg/m  as calculated from the following:    Height as of 12/27/21: 1.676 m (5' 6\").    Weight as of 12/27/21: 114.6 kg (252 lb 9.6 oz).    Current weight: unkown (She does not remember what it was when last checked, and states recently checked at [facility], do not have access to this info. Encouraged she send wt check via [employer], pt agreeable)    Required weight loss goal pre-op: 10 lbs from initial consult weight (goal weight 242 lbs or less before surgery)       12/27/2021   I have tried the following methods to lose weight Watching portions or calories, Exercise, Slimfast, OTC Medications       Weight Loss Questions Reviewed With Patient 12/27/2021   How long have you been overweight? Since late teens through early 20's       SUPPLEMENT INFORMATION:  Vitamin D    NUTRITION HISTORY:  NKFA. Does not tolerate greasy foods, example pt provided - ground beef  Previous experiences with loss: diet gummies (just made her go to the bathroom more), increased exercise.   Usually not having breakfast, and going a while until eating full meal  Lunch is biggest meal.  She describes having new early satiety at meals in past couple months. Not feeling that hungry will eat whatever.  Working on cutting out pop - 3-4 cans per day, down from 5-6 cans per " day.   She has 6 kids, 3-17 years old. 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   Often portioning out more food than she finds she can eat.     Today - Continues to wean pop intake. Practicing portion control and increasing fruit intake.     Diet Recall:  skips or toast/apple   3-4 pm 1 sandwich (turkey/ham + light parekh on honey wheat) with fruit cup   Beverages: water     Progress Towards Weight Loss:   1) Continue to work on eliminating pop - aim for limit 2-3 cans per day. - Improving, limiting to 1 can per day  2) Eat 3 meals per day. Avoid snacking as able. - Met, continues  3) Use the Plate Method for meals (see below) - Improving    - Protein shake/bar as back up meal  4) Eat slowly (20-30 minutes per meal), chewing foods well (25 chews per bite/applesauce consistency) - Improving   5) Do not drink with meals. Wait 30 mins after meal to drink again - Not met, forgot about this goal  6) Consume 64+ oz fluid per day. Small, frequent sips all day. - Improving    Eating Habits 12/27/2021   Do you have any dietary restrictions? No   Do you currently binge eat (eat a large amount of food in a short time)? Yes - not recently, feeling puckett quickers   Are you an emotional eater? Yes - depressed    Do you get up to eat after falling asleep? Yes   What foods do you crave? 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     Dining Out History Reviewed With Patient 12/27/2021   How often do you dine out? Around once a week.   Where do you dine out? (select all that apply) sit-down restaurants, fast food chains, take out   What types of food do you order when you dine out? Salad, chicken, burgers, pizza, tacos       Physical Activity Reviewed With Patient 12/27/2021   How often do you exercise? Daily   What is the duration of your exercise (in minutes)? 45  Minutes   What types of exercise do you do? walking, home gym   What keeps you from being more active?  I am as active as I can possbily be         NUTRITION DIAGNOSIS:  Obesity r/t long history of positive energy balance aeb BMI >30 kg/m2.    INTERVENTION:  Intervention Provided/Education Provided:  Reviewed post-op diet guidelines, ways to help prepare for post-op diet guidelines pre-operatively, portion/calorie-control, mindful eating and sources of protein.   Discussed importance of establishing a consistent meal pattern, increasing lean protein first, and then fruit/veggies portions at meals.  Provided education on healthy, high-fiber sources of carb and appropriate serving sizes for mgmt of DM.   Reviewed surgery task list.  Patient demonstrates understanding. Provided pt with list of goals RD contact information.      Questions Reviewed With Patient 12/27/2021   How ready are you to make changes regarding your weight? Number 1 = Not ready at all to make changes up to 10 = very ready. 10   How confident are you that you can change? 1 = Not confident that you will be successful making changes up to 10 = very confident. 10       Expected Engagement: good     Follow-up: Scheduled for 8/1 with pt. Attempted to schedule July appt, however provider schedule completely full.    GOALS:  1) Continue to work on eliminating pop  2) Consume 3 servings of lean protein daily   - Lean protein sources: chicken/turkey without skin, fish, pork tenderloin, beef sirloin, shellfish, eggs, beans, lentils, tofu, tempeh, low-fat yogurt/cottage cheese, protein bar/shake (see list below for more)  3) Eat slowly (20-30 minutes per meal), chewing foods well (25 chews per bite/applesauce consistency)  4) Do not drink with meals. Wait 30 mins after meal to drink again  5) Consume 64+ oz fluid per day. Small, frequent sips all day.  6) Send wt check via [employer] [facility] message    The Plate  Method:  https://cdn1.sph.[employer]/wp-content/uploads/sites/30/2012/09/MIKDgo0924.jpg    https://www.cdc.gov/diabetes/images/managing/Diabetes-Manage-Eat-Well-Plate-Graphic_600px.jpg    Protein Sources for Weight Loss  [email or web address]     Carbohydrates  [email or web address]     Mindful Eating  [email or web address]     Summary of Volumetrics Eating Plan  [email or web address]     Diet Guidelines after Weight Loss Surgery  [email or web address]     Seated Exercises for Arms and Legs (can be done before or after surgery)  [email or web address]    Meal Replacement Shake Options:   *Protein Shake Criteria: no more than 210 Calories, at least 20 grams of protein, and less than 10 grams of sugar   [employer] [facility] smoothie (160 Calories, 20 g protein)   Premier Protein (160 Calories, 30 g protein)  Slim Fast Advanced Nutrition (180 Calories, 20 g protein)  Muscle Milk, lactose-free, 17 oz bottle (210 Calories, 30 g protein)  Integrated Supplements, no artificial sugars (110 Calories, 20 g protein)  Genepro, unflavored protein powder (60 Calories, 30 g protein)  Boost/Ensure Max (160 calories, 30 gm protein)   [employer] Core Power (170 calories, 26 gm protein)    Meal Replacement Bar Options:   [facility] Protein Shake (160 Calories, 15 g protein)  Quest Protein Bars (190 Calories, 20 g protein)  Built Bar (170 Calories, 15-20 g protein)  One Protein Bar (210 calories, 20 g protein)  [place] Signature Protein Bar ([employer]) (190 Calories, 21 g protein)  Pure Protein Bars (180 Calories, 21 g protein)      Time spent with patient: 17 minutes.  Kanwal Hunter RD, LD        Again, thank you for allowing me to participate in the care of your patient.      Sincerely,    Kanwal Hunter RD

## 2022-07-05 ENCOUNTER — TELEPHONE (OUTPATIENT)
Dept: ENDOCRINOLOGY | Facility: CLINIC | Age: 37
End: 2022-07-05

## 2022-07-05 NOTE — TELEPHONE ENCOUNTER
Left voice message for patient that an updated tasklist with letters to [facility] providers [facility]

## 2022-07-05 NOTE — TELEPHONE ENCOUNTER
Pt says still waiting for some documents to be sent to her MyChart for her to get her doctors to sign?

## 2022-08-04 NOTE — PROGRESS NOTES
"César Son is a 36 year old female who is being evaluated via a billable video visit.      The patient has been notified of following:     \"This video visit will be conducted via a call between you and your physician/provider. We have found that certain health care needs can be provided without the need for an in-person physical exam.  This service lets us provide the care you need with a video conversation.  If a prescription is necessary we can send it directly to your pharmacy.  If lab work is needed we can place an order for that and you can then stop by our lab to have the test done at a later time.    Video visits are billed at different rates depending on your insurance coverage.  Please reach out to your insurance provider with any questions.    If during the course of the call the physician/provider feels a video visit is not appropriate, you will not be charged for this service.\"    Patient has given verbal consent for Video visit? Yes  How would you like to obtain your AVS? MyChart  Will anyone else be joining your video visit? No  {If patient encounters technical issues they should call [phone]      Video-Visit Details    Type of service:  Video Visit    Video Start Time:  11:30 PM  Video End Time:  12:00 PM    Originating Location (pt. Location): Home    Distant Location (provider location):  [facility] [place]     Platform used for Video Visit: [employer]    During this virtual visit the patient is located in [place], patient verifies this as the location during the entirety of this visit.         Bariatric Nutrition Consultation Note    Reason For Visit: Nutrition Assessment    César Son is a 36 year old presenting today for return bariatric nutrition consult.   Pt is interested in laparoscopic sleeve gastrectomy with Dr. Slade.  Patient is accompanied by self.  This is pt's 3rdd of 6 required nutrition visits prior to surgery.     Pt referred by Renetta " "DILLON Arora on January 18, 2022.  Patient with Co-morbidities of obesity including:  Type II DM yes  Renal Failure no  Sleep apnea yes  Hypertension yes   Dyslipidemia no  Joint pain yes  Back pain yes  GERD no     Support System Reviewed With Patient 12/27/2021   Who do you have in your support network that can be available to help you for the first 2 weeks after surgery? Kid's father, kids   Who can you count on for support throughout your weight loss surgery journey? kid's father, kids       ANTHROPOMETRICS:  Estimated body mass index is 40.77 kg/m  as calculated from the following:    Height as of 12/27/21: 1.676 m (5' 6\").    Weight as of 12/27/21: 114.6 kg (252 lb 9.6 oz).    Current weight: unkown (She feels like she has lost wt and people have been commenting on her appearance. Plan for her to check weight at upcoming primary appt)    Required weight loss goal pre-op: 10 lbs from initial consult weight (goal weight 242 lbs or less before surgery)       12/27/2021   I have tried the following methods to lose weight Watching portions or calories, Exercise, Slimfast, OTC Medications       Weight Loss Questions Reviewed With Patient 12/27/2021   How long have you been overweight? Since late teens through early 20's       SUPPLEMENT INFORMATION:  Vitamin D    NUTRITION HISTORY:  NKFA. Does not tolerate greasy foods, example pt provided - ground beef  Previous experiences with loss: diet gummies (just made her go to the bathroom more), increased exercise.   Usually not having breakfast, and going a while until eating full meal  Lunch is biggest meal.  She describes having new early satiety at meals in past couple months. Not feeling that hungry will eat whatever.  Working on cutting out pop - 3-4 cans per day, down from 5-6 cans per day.   She has 6 kids, 3-17 years old. 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   Often portioning out more food than she finds she can eat.     June 2022 - " Continues to wean pop intake. Practicing portion control and increasing fruit intake.     Today - Focusing on eating less meat (upsetting her stomach lately). She eliminated pop intake. Notices she is less hungry throughout the day and needing less to feel full. Not eating after 8 pm. She is currently looking for new housing.    Diet Recall:  Breakfast: skips or toast/apple; yogurt; fruit cup  Lunch: 1 sandwich (turkey/ham + light parekh on honey wheat) with fruit cup; cup noodles   Dinner: 5-6 pm spaghetti; diann; lasagna and salad  Beverages: Water (8 oz bottle x 4)    Progress Towards Weight Loss:   1) Continue to work on eliminating pop - Met, continues  2) Consume 3 servings of lean protein daily - Improving   - Lean protein sources: chicken/turkey without skin, fish, pork tenderloin, beef sirloin, shellfish, eggs, beans, lentils, tofu, tempeh, low-fat yogurt/cottage cheese, protein bar/shake (see list below for more)  3) Eat slowly (20-30 minutes per meal), chewing foods well (25 chews per bite/applesauce consistency) - Improving  4) Do not drink with meals. Wait 30 mins after meal to drink again - Hard at first but she is getting used to it  5) Consume 64+ oz fluid per day. Small, frequent sips all day. - Not met  6) Send wt check via [employer] message - Not met    Eating Habits 12/27/2021   Do you have any dietary restrictions? No   Do you currently binge eat (eat a large amount of food in a short time)? Yes - not recently, feeling puckett quickers   Are you an emotional eater? Yes - depressed    Do you get up to eat after falling asleep? Yes   What foods do you crave? 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  Walking a lot more in the summer.      Dining Out History Reviewed With Patient 12/27/2021   How often do you dine out? Around once a week.   Where do you  dine out? (select all that apply) sit-down restaurants, fast food chains, take out   What types of food do you order when you dine out? Salad, chicken, burgers, pizza, tacos       Physical Activity Reviewed With Patient 12/27/2021   How often do you exercise? Daily   What is the duration of your exercise (in minutes)? 45 Minutes   What types of exercise do you do? walking, home gym   What keeps you from being more active?  I am as active as I can possbily be         NUTRITION DIAGNOSIS:  Obesity r/t long history of positive energy balance aeb BMI >30 kg/m2. - continues    INTERVENTION:  Intervention Provided/Education Provided:  Reviewed post-op diet guidelines, ways to help prepare for post-op diet guidelines pre-operatively, portion/calorie-control, mindful eating and sources of protein.   Provided nutrition education on dietary mgmt of DM, including healthy food choice and how to create a balanced meal. Reviewed importance of establishing a consistent meal pattern, increasing lean protein intake, and then fruit/veggies portions at meals.  Reviewed surgery task list. Pt states she has yet to establish with a therapist, entered  referral so scheduling can reach out to her about setting up.   Patient demonstrates understanding. Provided pt with list of goals RD contact information.      Questions Reviewed With Patient 12/27/2021   How ready are you to make changes regarding your weight? Number 1 = Not ready at all to make changes up to 10 = very ready. 10   How confident are you that you can change? 1 = Not confident that you will be successful making changes up to 10 = very confident. 10       Expected Engagement: good     Follow-up: Scheduled for 9/6    GOALS:  1) Continue calorie/sugar-free hydration only. Consume 64+ oz fluid per day. Small, frequent sips all day.  2) Consume 3 servings of lean protein daily   - Lean protein sources: chicken/turkey without skin, fish, pork tenderloin, beef sirloin, shellfish,  eggs, beans, lentils, tofu, tempeh, low-fat yogurt/cottage cheese, protein bar/shake (see list below for more)  3) Eat slowly (20-30 minutes per meal), chewing foods well (25 chews per bite/applesauce consistency)  4) Do not drink with meals. Wait 30 mins after meal to drink again  5) Have weight checked at primary care this month    The Plate Method:  https://[employer].[place].edu/wp-content/uploads/sites/30/2012/09/SEPJsj5387.jpg    https://www.cdc.gov/diabetes/images/managing/Diabetes-Manage-Eat-Well-Plate-Graphic_600px.jpg    Protein Sources for Weight Loss  [email or web address]     Carbohydrates  [email or web address]     Mindful Eating  [email or web address]     Summary of Volumetrics Eating Plan  [email or web address]     Diet Guidelines after Weight Loss Surgery  [email or web address]     Seated Exercises for Arms and Legs (can be done before or after surgery)  [email or web address]    Meal Replacement Shake Options:   *Protein Shake Criteria: no more than 210 Calories, at least 20 grams of protein, and less than 10 grams of sugar   [facility] smoothie (160 Calories, 20 g protein)   Premier Protein (160 Calories, 30 g protein)  Slim Fast Advanced Nutrition (180 Calories, 20 g protein)  Muscle Milk, lactose-free, 17 oz bottle (210 Calories, 30 g protein)  Integrated Supplements, no artificial sugars (110 Calories, 20 g protein)  Genepro, unflavored protein powder (60 Calories, 30 g protein)  Boost/Ensure Max (160 calories, 30 gm protein)   [employer] Core Power (170 calories, 26 gm protein)    Meal Replacement Bar Options:  [facility] Protein Shake (160 Calories, 15 g protein)  Quest Protein Bars (190 Calories, 20 g protein)  Built Bar (170 Calories, 15-20 g protein)  One Protein Bar (210 calories, 20 g protein)  [place] Signature Protein Bar ([employer]) (190 Calories, 21 g protein)  Pure Protein Bars (180 Calories, 21 g protein)      Time spent with  patient: 30 minutes.  Kanwal Hunter RD, LD

## 2022-08-05 ENCOUNTER — VIRTUAL VISIT (OUTPATIENT)
Dept: ENDOCRINOLOGY | Facility: CLINIC | Age: 37
End: 2022-08-05
Payer: COMMERCIAL

## 2022-08-05 DIAGNOSIS — Z71.3 NUTRITIONAL COUNSELING: Primary | ICD-10-CM

## 2022-08-05 DIAGNOSIS — Z98.84 BARIATRIC SURGERY STATUS: ICD-10-CM

## 2022-08-05 DIAGNOSIS — E66.9 DIABETES MELLITUS TYPE 2 IN OBESE: ICD-10-CM

## 2022-08-05 DIAGNOSIS — E66.9 OBESITY: ICD-10-CM

## 2022-08-05 DIAGNOSIS — E11.69 DIABETES MELLITUS TYPE 2 IN OBESE: ICD-10-CM

## 2022-08-05 PROCEDURE — 97803 MED NUTRITION INDIV SUBSEQ: CPT | Mod: 95 | Performed by: DIETITIAN, REGISTERED

## 2022-08-05 NOTE — LETTER
Date:August 5, 2022      Provider requested that no letter be sent. Do not send.       [facility]

## 2022-08-05 NOTE — PATIENT INSTRUCTIONS
Darryn Lindsey,    Follow-up with RD on 9/6    Thank you,    Kanwal [place], RD, LD  If you would like to schedule or reschedule an appointment with the RD, please call [phone]    Nutrition Goals  1) Continue calorie/sugar-free hydration only. Consume 64+ oz fluid per day. Small, frequent sips all day.  2) Consume 3 servings of lean protein daily   - Lean protein sources: chicken/turkey without skin, fish, pork tenderloin, beef sirloin, shellfish, eggs, beans, lentils, tofu, tempeh, low-fat yogurt/cottage cheese, protein bar/shake (see list below for more)  3) Eat slowly (20-30 minutes per meal), chewing foods well (25 chews per bite/applesauce consistency)  4) Do not drink with meals. Wait 30 mins after meal to drink again  5) Have weight checked at primary care this month    Interested in working with a health ?  Health coaches work with you to improve your overall health and wellbeing.  They look at the whole person, and may involve discussion of different areas of life, including, but not limited to the four pillars of health (sleep, exercise, nutrition, and stress management). Discuss with your care team if you would like to start working a health .    Health Coaching-3 Pack:    $99 for three health coaching visits    Visits may be done in person or via phone    Coaching is a partnership between the  and the client; Coaches do not prescribe or diagnose    Coaching helps inspire the client to reach his/her personal goals      COMPREHENSIVE WEIGHT MANAGEMENT PROGRAM  VIRTUAL SUPPORT GROUPS    For Support Group Information:      We offer support groups for patients who are working on weight loss and considering, preparing for or have had weight loss surgery.   There is no cost for this opportunity.  You are invited to attend the?Virtual Support Groups?provided by any of the following locations:    [facility] via [employer] Teams with Katy Root RN  2.   [place] via Microsoft Teams with Alhaji  "Mine, PhD, LP  3.   [place] via [employer] with Jesica Bnauelos RN  4.   [facility] via [employer] Teams with Jesica Granado [facility]    The following Support Group information can also be found on our website:  [email or web address]      [facility] Weight Loss Surgery Support Group    [facility] Weight Loss Surgery Support Group  The support group is a patient-lead forum that meets monthly to share experiences, encouragement and education. It is open to those who have had weight loss surgery, are scheduled for surgery, and those who are considering surgery.   WHEN: This group meets on the 3rd Wednesday of each month from 5:00PM - 6:00PM virtually using Microsoft Teams.   FACILITATOR: Led by Katy Root RD, LD, RN, the program's Clinical Coordinator.   TO REGISTER: Please contact the clinic via [employer] or call the nurse line directly at [phone] to inform our staff that you would like an invite sent to you and to let us know the email you would like the invite sent to. Prior to the meeting, a link with directions on how to join the meeting will be sent to you.    2022 Meetings  January 19: \"Let's Talk\" a time for the group to share.  February 16: \"Let's Talk\" a time for the group to share.  March 16: Guest Speakers: Psychologists, Meghan Quiñones, PhD,LP and Carmella Thornton PsJuan Pablo,LP  April 20: Guest Speaker: Health Tisha, [facility],CHES, CPT  May 18: Guest Speaker: DietitianMarquez, ABBY, LP  [place] 15: \"Let's Talk\" a time for the group to share.  July 20: \"Let's Talk\" a time for the group to share.  August 17: TBA  September 21: TBA  October 19: Guest Speaker: Dr Nehemiah Figueroa MD Pulmonologist and Sleep Medicine Physician, \"Getting a Good Night's Sleep\".  November 16: TBA  December 21: TBA    [facility] Support Groups    Connections: Bariatric Care Support " "Group?  This is open to all [facility] (and those external to this program) pre- and post- operative bariatric surgery patients as well as their support system.   WHEN: This group meets the 2nd Tuesday of each month from 6:30 PM - 8:00 PM virtually using Microsoft Teams.   FACILITATOR: Led by Alhaji Blount, Ph.D who is a Licensed Psychologist with the [facility] Comprehensive Weight Management Program.   TO REGISTER: Please send an email to Alhaji Blount, Ph.D., LP at?[email or web address]?if you would like an invitation to the group and to learn about using Microsoft Teams.    2022 Meetings  January 11: Shiloh Damon, PharmD, Pharmacy Resident at [facility], \"Medications and Bariatric Surgery\".  February 8: Open Forum  March 8  April 12  May 10  Kimberley 14    Connections: Post-Operative Bariatric Surgery Support Group  This is a support group for [facility] bariatric patients (and those external to [facility]) who have had bariatric surgery and are at least 3 months post-surgery.  WHEN: This support group meets the 4th Wednesday of the month from 11:00 AM - 12:00 PM virtually using Microsoft Teams.   FACILITATOR: Led by Certified Bariatric Nurse, Jesica Banuelos RN.   TO REGISTER: Please send an email to Jesica at [email or web address] if you would like an invitation to the group and to learn about using Microsoft Teams.    2022 Meetings  January 26  February 23  March 23  April 27  May 25  Kimberley 22    [facility] Healthy Lifestyle Virtual Support Group    Healthy Lifestyle Virtual Support Group?  This is 60 minutes of small group guided discussion, support and resources. All are welcome who want a healthy lifestyle.  WHEN: This group meets monthly on a Friday from 12:30 PM - 1:30 PM virtually using Microsoft Teams.   FACILITATOR: Led by National Board Certified Health and , [facility].   TO REGISTER: Please send an " email to Jesica [email or web address] to receive monthly invites to the group or if you have any questions about having a health .  Prior to the meeting, a link with directions on how to join the meeting will be sent to you.    2022 Meetings  MAY 20: OPEN FORUM  JUNE: 24:  Setting Limits and BoundariesJesica  July 29: OPEN FORUM  August 26: Special Guest Registered Dietician Leanne Baptiste  Sept 30: OPEN FORUM  Oct 28, Ludy Gaffney National Board Certified Health   Nov 18: Navigating How to Eat Around the Holidays with ABBY Nicole  Dec 16: Changing your relationship with movement with  Tisha National Board Certified Health

## 2022-08-05 NOTE — LETTER
"8/5/2022       RE: César Son  [address]  [place] [address]     Dear Colleague,    Thank you for referring your patient, César Son, to the [facility] at [facility]. Please see a copy of my visit note below.    César Son is a 36 year old female who is being evaluated via a billable video visit.      The patient has been notified of following:     \"This video visit will be conducted via a call between you and your physician/provider. We have found that certain health care needs can be provided without the need for an in-person physical exam.  This service lets us provide the care you need with a video conversation.  If a prescription is necessary we can send it directly to your pharmacy.  If lab work is needed we can place an order for that and you can then stop by our lab to have the test done at a later time.    Video visits are billed at different rates depending on your insurance coverage.  Please reach out to your insurance provider with any questions.    If during the course of the call the physician/provider feels a video visit is not appropriate, you will not be charged for this service.\"    Patient has given verbal consent for Video visit? Yes  How would you like to obtain your AVS? MyChart  Will anyone else be joining your video visit? No  {If patient encounters technical issues they should call [phone]      Video-Visit Details    Type of service:  Video Visit    Video Start Time:  11:30 PM  Video End Time:  12:00 PM    Originating Location (pt. Location): Home    Distant Location (provider location):  [facility]     Platform used for Video Visit: [employer]    During this virtual visit the patient is located in [place], patient verifies this as the location during the entirety of this visit.         Bariatric Nutrition Consultation " "Note    Reason For Visit: Nutrition Assessment    César Son is a 36 year old presenting today for return bariatric nutrition consult.   Pt is interested in laparoscopic sleeve gastrectomy with Dr. Slade.  Patient is accompanied by self.  This is pt's 3rdd of 6 required nutrition visits prior to surgery.     Pt referred by DILLON Vu on January 18, 2022.  Patient with Co-morbidities of obesity including:  Type II DM yes  Renal Failure no  Sleep apnea yes  Hypertension yes   Dyslipidemia no  Joint pain yes  Back pain yes  GERD no     Support System Reviewed With Patient 12/27/2021   Who do you have in your support network that can be available to help you for the first 2 weeks after surgery? Kid's father, kids   Who can you count on for support throughout your weight loss surgery journey? kid's father, kids       ANTHROPOMETRICS:  Estimated body mass index is 40.77 kg/m  as calculated from the following:    Height as of 12/27/21: 1.676 m (5' 6\").    Weight as of 12/27/21: 114.6 kg (252 lb 9.6 oz).    Current weight: unkown (She feels like she has lost wt and people have been commenting on her appearance. Plan for her to check weight at upcoming primary appt)    Required weight loss goal pre-op: 10 lbs from initial consult weight (goal weight 242 lbs or less before surgery)       12/27/2021   I have tried the following methods to lose weight Watching portions or calories, Exercise, Slimfast, OTC Medications       Weight Loss Questions Reviewed With Patient 12/27/2021   How long have you been overweight? Since late teens through early 20's       SUPPLEMENT INFORMATION:  Vitamin D    NUTRITION HISTORY:  NKFA. Does not tolerate greasy foods, example pt provided - ground beef  Previous experiences with loss: diet gummies (just made her go to the bathroom more), increased exercise.   Usually not having breakfast, and going a while until eating full meal  Lunch is biggest meal.  She describes having new early " satiety at meals in past couple months. Not feeling that hungry will eat whatever.  Working on cutting out pop - 3-4 cans per day, down from 5-6 cans per day.   She has 6 kids, 3-17 years old. 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   Often portioning out more food than she finds she can eat.     June 2022 - Continues to wean pop intake. Practicing portion control and increasing fruit intake.     Today - Focusing on eating less meat (upsetting her stomach lately). She eliminated pop intake. Notices she is less hungry throughout the day and needing less to feel full. Not eating after 8 pm. She is currently looking for new housing.    Diet Recall:  Breakfast: skips or toast/apple; yogurt; fruit cup  Lunch: 1 sandwich (turkey/ham + light parekh on honey wheat) with fruit cup; cup noodles   Dinner: 5-6 pm spaghetti; diann; lasagna and salad  Beverages: Water (8 oz bottle x 4)    Progress Towards Weight Loss:   1) Continue to work on eliminating pop - Met, continues  2) Consume 3 servings of lean protein daily - Improving   - Lean protein sources: chicken/turkey without skin, fish, pork tenderloin, beef sirloin, shellfish, eggs, beans, lentils, tofu, tempeh, low-fat yogurt/cottage cheese, protein bar/shake (see list below for more)  3) Eat slowly (20-30 minutes per meal), chewing foods well (25 chews per bite/applesauce consistency) - Improving  4) Do not drink with meals. Wait 30 mins after meal to drink again - Hard at first but she is getting used to it  5) Consume 64+ oz fluid per day. Small, frequent sips all day. - Not met  6) Send wt check via [employer] message - Not met    Eating Habits 12/27/2021   Do you have any dietary restrictions? No   Do you currently binge eat (eat a large amount of food in a short time)? Yes - not recently, feeling puckett quickers   Are you an emotional eater? Yes - depressed    Do you get up to eat after falling asleep? Yes   What foods do you crave? 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  Walking a lot more in the summer.      Dining Out History Reviewed With Patient 12/27/2021   How often do you dine out? Around once a week.   Where do you dine out? (select all that apply) sit-down restaurants, fast food chains, take out   What types of food do you order when you dine out? Salad, chicken, burgers, pizza, tacos       Physical Activity Reviewed With Patient 12/27/2021   How often do you exercise? Daily   What is the duration of your exercise (in minutes)? 45 Minutes   What types of exercise do you do? walking, home gym   What keeps you from being more active?  I am as active as I can possbily be         NUTRITION DIAGNOSIS:  Obesity r/t long history of positive energy balance aeb BMI >30 kg/m2. - continues    INTERVENTION:  Intervention Provided/Education Provided:  Reviewed post-op diet guidelines, ways to help prepare for post-op diet guidelines pre-operatively, portion/calorie-control, mindful eating and sources of protein.   Provided nutrition education on dietary mgmt of DM, including healthy food choice and how to create a balanced meal. Reviewed importance of establishing a consistent meal pattern, increasing lean protein intake, and then fruit/veggies portions at meals.  Reviewed surgery task list. Pt states she has yet to establish with a therapist, entered  referral so scheduling can reach out to her about setting up.   Patient demonstrates understanding. Provided pt with list of goals RD contact information.      Questions Reviewed With Patient 12/27/2021   How ready are you to make changes regarding your weight? Number 1 = Not ready at all to make changes up to 10 = very ready. 10   How confident are you that you can change? 1 = Not confident that you will be successful making changes up to 10 = very confident. 10        Expected Engagement: good     Follow-up: Scheduled for 9/6    GOALS:  1) Continue calorie/sugar-free hydration only. Consume 64+ oz fluid per day. Small, frequent sips all day.  2) Consume 3 servings of lean protein daily   - Lean protein sources: chicken/turkey without skin, fish, pork tenderloin, beef sirloin, shellfish, eggs, beans, lentils, tofu, tempeh, low-fat yogurt/cottage cheese, protein bar/shake (see list below for more)  3) Eat slowly (20-30 minutes per meal), chewing foods well (25 chews per bite/applesauce consistency)  4) Do not drink with meals. Wait 30 mins after meal to drink again  5) Have weight checked at primary care this month    The Plate Method:  https://cdInstant Information.sph.[place].edu/wp-content/uploads/sites/30/2012/09/ARQQqc1537.jpg    https://www.cdc.gov/diabetes/images/managing/Diabetes-Manage-Eat-Well-Plate-Graphic_600px.jpg    Protein Sources for Weight Loss  [email or web address]     Carbohydrates  [email or web address]     Mindful Eating  [email or web address]     Summary of Volumetrics Eating Plan  [email or web address]     Diet Guidelines after Weight Loss Surgery  [email or web address]     Seated Exercises for Arms and Legs (can be done before or after surgery)  [email or web address]    Meal Replacement Shake Options:   *Protein Shake Criteria: no more than 210 Calories, at least 20 grams of protein, and less than 10 grams of sugar   [facility] smoothie (160 Calories, 20 g protein)   Premier Protein (160 Calories, 30 g protein)  Slim Fast Advanced Nutrition (180 Calories, 20 g protein)  Muscle Milk, lactose-free, 17 oz bottle (210 Calories, 30 g protein)  Integrated Supplements, no artificial sugars (110 Calories, 20 g protein)  Genepro, unflavored protein powder (60 Calories, 30 g protein)  Boost/Ensure Max (160 calories, 30 gm protein)   Fairlife Core Power (170 calories, 26 gm protein)    Meal Replacement Bar Options:  iSSimple  [facility] Protein Shake (160 Calories, 15 g protein)  Quest Protein Bars (190 Calories, 20 g protein)  Built Bar (170 Calories, 15-20 g protein)  One Protein Bar (210 calories, 20 g protein)  Land Signature Protein Bar ([employer]) (190 Calories, 21 g protein)  Pure Protein Bars (180 Calories, 21 g protein)      Time spent with patient: 30 minutes.  Kanwal [place] RD, LD      Again, thank you for allowing me to participate in the care of your patient.      Sincerely,    Kanwal Hunter RD

## 2022-08-07 ENCOUNTER — MEDICAL CORRESPONDENCE (OUTPATIENT)
Dept: HEALTH INFORMATION MANAGEMENT | Facility: CLINIC | Age: 37
End: 2022-08-07

## 2022-08-22 ENCOUNTER — CARE COORDINATION (OUTPATIENT)
Dept: ENDOCRINOLOGY | Facility: CLINIC | Age: 37
End: 2022-08-22

## 2022-08-22 ENCOUNTER — TELEPHONE (OUTPATIENT)
Dept: SURGERY | Facility: CLINIC | Age: 37
End: 2022-08-22

## 2022-08-22 NOTE — PROGRESS NOTES
Tasklist updated and sent to patient via letters per her request.    Bariatric Task List  Fax:  Please fax all paperwork to: [phone] -     Status:  Status:  surgery evaluation in process -     Surgeon: Yany Nolan surgery month/year: TBD -        Insurance: Insurance:  [facility] -        Patient Info: Initial Weight:  252 -     Date of Initial Weight/Height:  12/27/2021 -     Goal Weight (lbs):  242 -     Required Weight Loss:  10 -     Surgery Type:  sleeve gastrectomy -        Dietician Visits: Structured weight loss required by insurance?:  structured weight loss required -     Dietician Visit 1:  Completed - 1/20/22. bks   Dietician Visit 2:  Completed - 6/30/22 KB   Dietician Visit 3:  Completed - 8/5/22 KB   Dietician Visit 4:  Needed -     Dietician Visit 5:  Needed -     Dietician Visit 6:  Needed -     Dietician Visit additional:  Needed - Monthly until surgery for weight loss and postop diet teaching. [facility]      Psychological Evaluation: Psych eval:  Needed - 10/17/22, 10/24/22 (ask if there is a 3rd appointment- usually there is 3 appointments). [facility]   Therapist letter of support:  Needed -     Psychiatrist letter of support:  Needed -     Establish care with therapist:  Needed -     Other:  establish with psychiatry -        Lab Work: Complete Blood Count:  Completed -     Comprehensive Metabolic Panel:  Completed -     Vitamin D:  Completed -     PTH:  Completed -     Hgb A1c:  Completed -      Lipids: Completed -      TSH ([facility] [place], [place]): Completed - 12/14/21. [facility]      Consults/ [facility] Sleep Medicine:  Needed - Call [phone] to schedule consult. 10/26/22 Yumiko Hawkins MD appt.  [facility]   Medical Weight Management: Completed -        Testing: Sleep Study: Needed - If requested by Dr Hawkins. [facility]   Other:   -     Other:    -        PCP: Establish care with PCP:  Completed -     Follow up with PCP:    -     PCP letter of support:  Needed -        Patient Education:  Information  "Session:  Completed -     Given \"Making your decision\" handout?:  Yes -     Given \"A Roadmap to you Weight Loss Surgery\" handout?: Yes -     Given \"Get Well Loop\" information?: Yes -     Given support group information?:    -     Attended support group?:    -     Support plan in place?:  Completed -        Final Tasks: To complete after surgery date is determined. Before surgery online class:  Needed -     Before surgery online class website link:  [email or web address]   After surgery online class:  Needed -     After surgery online class website link:  [email or web address]   Nurse visit per clinic:  Needed -     History and Physical per clinic:   -     Final labs per clinic: Needed -        Notes:   -            "

## 2022-08-22 NOTE — LETTER
"August 22, 2022      TO: César Son  [address]  [place] [address]       Hi César Son,    Here is your updated tasklist.  I will send you updates as I receive them.  Feel free to contact me if you have any questions or updates.    Sincerely,  Tresa, RN    Elinor Carlisle, MS, RN  Surgical Weight Management Nurse Coordinator   Send [facility] messages to \"[facility] Nurs[facility] - \"  Fax: [phone]  Contact Center Nurse Line and Clinic Scheduling: to leave a message [phone]    The Seminar has changed! It is now a 6 video series that takes less than 30 minutes to view.  Go to the bottom of page at [email or web address]  for the \"[facility] Weight Loss Surgery Video\".      Bariatric Task List  Fax:  Please fax all paperwork to: [phone] -     Status:  Status:  surgery evaluation in process -     Surgeon: Yany Nolan surgery month/year: TBD -        Insurance: Insurance:  [facility] -        Patient Info: Initial Weight:  252 -     Date of Initial Weight/Height:  12/27/2021 -     Goal Weight (lbs):  242 -     Required Weight Loss:  10 -     Surgery Type:  sleeve gastrectomy -        Dietician Visits: Structured weight loss required by insurance?:  structured weight loss required -     Dietician Visit 1:  Completed - 1/20/22. bks   Dietician Visit 2:  Completed - 6/30/22 KB   Dietician Visit 3:  Completed - 8/5/22 KB   Dietician Visit 4:  Needed -     Dietician Visit 5:  Needed -     Dietician Visit 6:  Needed -     Dietician Visit additional:  Needed - Monthly until surgery for weight loss and postop diet teaching. bks      Psychological Evaluation: Psych eval:  Needed - 10/17/22, 10/24/22 (ask if there is a 3rd appointment- usually there is 3 appointments). bks   Therapist letter of support:  Needed -     Psychiatrist letter of support:  Needed -     Establish care with therapist:  Needed -     Other:  " "establish with psychiatry -        Lab Work: Complete Blood Count:  Completed -     Comprehensive Metabolic Panel:  Completed -     Vitamin D:  Completed -     PTH:  Completed -     Hgb A1c:  Completed -      Lipids: Completed -      TSH ([facility], [facility], [place]): Completed - 21. stephanie      Consults/ Clearance Sleep Medicine:  Needed - Call [phone] to schedule consult. 10/26/22 Yumiko Hawkins MD appt.  stephanie   Medical Weight Management: Completed -        Testing: Sleep Study: Needed - If requested by Dr Hawkins. stephanie   Other:   -     Other:    -        PCP: Establish care with PCP:  Completed -     Follow up with PCP:    -     PCP letter of support:  Needed -        Patient Education:  Information Session:  Completed -     Given \"Making your decision\" handout?:  Yes -     Given \"A Roadmap to you Weight Loss Surgery\" handout?: Yes -     Given \"Get Well Loop\" information?: Yes -     Given support group information?:    -     Attended support group?:    -     Support plan in place?:  Completed -        Final Tasks: To complete after surgery date is determined. Before surgery online class:  Needed -     Before surgery online class website link:  [email or web address]   After surgery online class:  Needed -     After surgery online class website link:  [email or web address]   Nurse visit per clinic:  Needed -     History and Physical per clinic:   -     Final labs per clinic: Needed -        Notes:   -                                                                                          Dear Primary Care Provider:    Thank you for coordinating with us to evaluate your patient, César Son, : 1985, for possible bariatric surgery. It is important to us that the primary care provider is aware of their patients' desire to have weight loss surgery and is supportive.    If you have not already submitted a referral with a clinic note indicating your support for this patient " "to have weight loss surgery, please enter a letter of support via Epic prior to the patient's next visit in our clinic.      Sample letter:    \"Dear Weight Loss Surgery Clinic,    I am the primary care provider for (patient name) and I support (him/her) having weight loss surgery.    Sincerely,    (provider name)\"    If patient meets criteria and clearances for surgery, we will submit to the insurance company for insurance approval and coordinate the needed appointments with our department.    If you have any questions, feel free to contact us via our Call Center at [phone].  Please fax the evaluation to [phone] or route to Elinor Carlisle RN via Epic.    Sincerely,    MD Patric Gregorio MD Eric Wise, MD Kayla Gish, CNP,  Renetta Arora, PAGIOVANNY Silva PA-C    Mailing Address:  [facility]  [address], [facility] [address], [place] [address]                            Dear Psychiatrist/Psychologist/Therapist:    We have a mutual patient, César Son, : 1985, with morbid obesity who is considering undergoing bariatric surgery.  A psychological evaluation is being done to see if this patient is cleared from a psychological perspective to undergo the elective surgery.  However, we need your assistance with a letter of support as outlined below. Your input is valuable and will affect our decision to hold or proceed with bariatric surgery.    This letter of support should outline:  1. Summary of treatment to date.  2. Treatment goals for before and after surgery that indicate mental health support and continuation of care.  3. Any concerns regarding this patient s ability to follow through with treatment recommendations.    4. If known, documentation of cessation of illicit drug use (our policy requires patients to be drug free of illicit drugs for at least one year prior to surgery).   5.        A statement that indicates if you support or do " not support this patient for weight loss surgery.    If you have any questions, feel free to contact us via our Call Center at [phone].  Please fax the evaluation to [phone] or route to Elinor Carlisle RN via Epic.    Sincerely,    MD Patric Gregorio MD Eric Wise, MD Kristi Kopacz, LEAH Song, RODRICK Silva PA-C    Mailing Address:  [facility] [address], [facility] [address], [facility] [place] [address]                                Dear Sleep Medicine Provider:    Thank you for taking the time to see our mutual patient, César Son, : 1985, for a pre-operative clearance evaluation for bariatric surgery.  This patient is referred for clearance because they have known DILAN, have symptoms of DILAN or have a BMI > 50 per our program protocol.  Please assist us with the following during your evaluation of our mutual patient:      Initial patient evaluation    Arrange and expedite necessary testing    Guide and facilitate follow-up treatment    Provide a letter stating that the patient is cleared to proceed with bariatric surgery from a sleep medicine standpoint    Indicate what treatment is needed pre-surgery (if any), during inpatient  hospitalization and after surgery    The evaluation must confirm that the patient is stable from a sleep medicine standpoint to proceed with the surgery.  If you have any questions, feel free to contact us via our Call Center at [phone].  Please fax the evaluation to [phone] or route to Elinor Carlisle RN via Epic.    Sincerely,    MD Patric Gregorio MD Eric Wise, MD Kristi Kopacz, LEAH Song, CNP  Lilly Silva PA-C    [facility] [address], [facility] [address], [place] [address]                                          Dear Psychiatrist/Psychologist/Therapist:    We have a mutual patient, César Son, : 1985, with morbid  obesity who is considering undergoing bariatric surgery.  A psychological evaluation is being done to see if this patient is cleared from a psychological perspective to undergo the elective surgery.  However, we need your assistance with a letter of support as outlined below. Your input is valuable and will affect our decision to hold or proceed with bariatric surgery.    This letter of support should outline:  1. Summary of treatment to date.  2. Treatment goals for before and after surgery that indicate mental health support and continuation of care.  3. Any concerns regarding this patient s ability to follow through with treatment recommendations.    4. If known, documentation of cessation of illicit drug use (our policy requires patients to be drug free of illicit drugs for at least one year prior to surgery).   5.        A statement that indicates if you support or do not support this patient for weight loss surgery.    If you have any questions, feel free to contact us via our Call Center at [phone].  Please fax the evaluation to [phone] or route to Elinor Carlisle RN via Epic.    Sincerely,    MD Patric Gregorio MD Eric Wise, MD Kristi Kopacz, PA-REGGIE Song, RODRICK Silva PA-C    Mailing Address:  [facility]  [address], [facility] [address], [place] [address]

## 2022-08-22 NOTE — TELEPHONE ENCOUNTER
Pt would like a call back regarding paperwork she is supposed to sent to her provider    [phone]

## 2022-09-02 NOTE — PROGRESS NOTES
"César Son is a 36 year old female who is being evaluated via a billable telephone visit.     The patient has been notified of following:     \"This telephone visit will be conducted via a call between you and your physician/provider. We have found that certain health care needs can be provided without the need for a physical exam.  This service lets us provide the care you need with a short phone conversation.  If a prescription is necessary we can send it directly to your pharmacy.  If lab work is needed we can place an order for that and you can then stop by our lab to have the test done at a later time.    Telephone visits are billed at different rates depending on your insurance coverage. During this emergency period, for some insurers they may be billed the same as an in-person visit.  Please reach out to your insurance provider with any questions.    If during the course of the call the physician/provider feels a telephone visit is not appropriate, you will not be charged for this service.\"    Patient has given verbal consent for Telephone visit?  Yes    How would you like to obtain your AVS? Mail    Phone call duration: 17 minutes    During this virtual visit the patient is located in [place], patient verifies this as the location during the entirety of this visit.           Bariatric Nutrition Consultation Note    Reason For Visit: Nutrition Assessment    César Son is a 36 year old presenting today for return bariatric nutrition consult.   Pt is interested in laparoscopic sleeve gastrectomy with Dr. Slade.  Patient is accompanied by self.  This is pt's 4th of 6 required nutrition visits prior to surgery.     Pt referred by DILLON Vu on January 18, 2022.  Patient with Co-morbidities of obesity including:  Type II DM yes  Renal Failure no  Sleep apnea yes  Hypertension yes   Dyslipidemia no  Joint pain yes  Back pain yes  GERD no     Support System Reviewed With Patient 12/27/2021   Who do you " "have in your support network that can be available to help you for the first 2 weeks after surgery? Kid's father, kids   Who can you count on for support throughout your weight loss surgery journey? kid's father, kids       ANTHROPOMETRICS:  Estimated body mass index is 40.77 kg/m  as calculated from the following:    Height as of 12/27/21: 1.676 m (5' 6\").    Weight as of 12/27/21: 114.6 kg (252 lb 9.6 oz).    Current weight: 241 lbs BMI 39      Required weight loss goal pre-op: 10 lbs from initial consult weight (goal weight 242 lbs or less before surgery)       12/27/2021   I have tried the following methods to lose weight Watching portions or calories, Exercise, Slimfast, OTC Medications       Weight Loss Questions Reviewed With Patient 12/27/2021   How long have you been overweight? Since late teens through early 20's       SUPPLEMENT INFORMATION:  Vitamin D    NUTRITION HISTORY:  NKFA. Does not tolerate greasy foods, example pt provided - ground beef  Previous experiences with loss: diet gummies (just made her go to the bathroom more), increased exercise.   Usually not having breakfast, and going a while until eating full meal  Lunch is biggest meal.  She describes having new early satiety at meals in past couple months. Not feeling that hungry will eat whatever.  Working on cutting out pop - 3-4 cans per day, down from 5-6 cans per day.   She has 6 kids, 3-17 years old. 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   Often portioning out more food than she finds she can eat.     June 2022 - Continues to wean pop intake. Practicing portion control and increasing fruit intake.     August 2022 - Focusing on eating less meat (upsetting her stomach lately). She eliminated pop intake. Notices she is less hungry throughout the day and needing less to feel full. Not eating after 8 pm. She is currently looking for new housing.    Today - Continues to avoid pop. Feeling puckett from meals. States she will " be starting PT     Diet Recall:  Breakfast: toast + yogurt + occ plum/pineapple; salad; boiled eggs    Lunch: 1 sandwich (turkey/ham + light parekh on honey wheat) with fruit cup; cup noodles   Dinner: 5-6 pm spaghetti; diann; lasagna and salad  Beverages: Water (64+ oz.day), Gatorade zero     Progress Towards Weight Loss:   1) Continue calorie/sugar-free hydration only. Consume 64+ oz fluid per day. Small, frequent sips all day. - Met, contnues  2) Consume 3 servings of lean protein daily - Improving    - Lean protein sources: chicken/turkey without skin, fish, pork tenderloin, beef sirloin, shellfish, eggs, beans, lentils, tofu, tempeh, low-fat yogurt/cottage cheese, protein bar/shake (see list below for more)  3) Eat slowly (20-30 minutes per meal), chewing foods well (25 chews per bite/applesauce consistency) - Met, continues   4) Do not drink with meals. Wait 30 mins after meal to drink again - Not met   5) Have weight checked at primary care this month - Met, continues     Eating Habits 12/27/2021   Do you have any dietary restrictions? No   Do you currently binge eat (eat a large amount of food in a short time)? Yes - not recently, feeling puckett quickers   Are you an emotional eater? Yes - depressed    Do you get up to eat after falling asleep? Yes   What foods do you crave? 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  Walking a lot more in the summer.      Dining Out History Reviewed With Patient 12/27/2021   How often do you dine out? Around once a week.   Where do you dine out? (select all that apply) sit-down restaurants, fast food chains, take out   What types of food do you order when you dine out? Salad, chicken, burgers, pizza, tacos       Physical Activity Reviewed With Patient 12/27/2021   How often do you exercise? Daily   What is the duration of your  exercise (in minutes)? 45 Minutes   What types of exercise do you do? walking, home gym   What keeps you from being more active?  I am as active as I can possbily be         NUTRITION DIAGNOSIS:  Obesity r/t long history of positive energy balance aeb BMI >30 kg/m2. - continues    INTERVENTION:  Intervention Provided/Education Provided:  Reviewed post-op diet guidelines, ways to help prepare for post-op diet guidelines pre-operatively, portion/calorie-control, mindful eating and sources of protein.   Provided nutrition education on dietary mgmt of DM, including healthy food choice and how to create a balanced meal. Reviewed importance of establishing a consistent meal pattern, increasing lean protein intake, and then fruit/veggies portions at meals.  Reviewed surgery task list. Mailed letters for clearances to pt.  Patient demonstrates understanding. Mailed AVS.    Questions Reviewed With Patient 12/27/2021   How ready are you to make changes regarding your weight? Number 1 = Not ready at all to make changes up to 10 = very ready. 10   How confident are you that you can change? 1 = Not confident that you will be successful making changes up to 10 = very confident. 10       Expected Engagement: good     Follow-up: Scheduled for 10/6    GOALS:  1) Continue calorie/sugar-free hydration only. Consume 64+ oz fluid per day. Small, frequent sips all day. Do not drink with meals. Wait 30 mins after meal to drink again  2) Consume 3 servings of lean protein daily   - Lean protein sources: chicken/turkey without skin, fish, pork tenderloin, beef sirloin, shellfish, eggs, beans, lentils, tofu, tempeh, low-fat yogurt/cottage cheese, protein bar/shake (see list below for more)  3) Eat slowly (20-30 minutes per meal), chewing foods well (25 chews per bite/applesauce consistency).  4) Check weight on a regular basis. Consider getting a scale for home.    Diet Guidelines after Weight Loss Surgery  [email or web address].pdf  "    Tools for after surgery:  GliaCure Dish Sets: bariatric meal size bowls and plates with built in measurement designs on the dishes    Bariatric Pal: https://store.bariatricpal.com/collections/bariatric-dinnerware    Books:  \"Fresh Start Bariatric Cookbook\" by Briana Cerna, MS, RDN, CD - Excellent cookbook with post-op recipes and many other resources to check out for ongoing support after surgery    \"Eating Well After Weight Loss Surgery\" by Jojo Mclaughlin and Hank Canales - Great cookbook with recipes for each diet stage after surgery and recommended portion sizes. Slightly more intensive cooking recipes.    \"Bariatric Mindset Success\" by Ana M Ramirez - book that helps with prevention of weight regain after surgery. Helps train your brain for long term success with weight loss after surgery.    Post-Bariatric Surgery Online Recipe Resources:  Online:    Simpler Recipes: https://www.Altheos/bariatric-surgery/recipes    https://www.[employer]/bariatric-recipes/    https://1000jobboersen.de/bariatric-recipes/     Some recipes on this site have larger than 1 cup portion size pictures: http://www.muschealth.org/weight-loss-surgery/nutrition/recipe-corner.html     https://www.Simple Lifeforms.me/25-bariatric-friendly-weeknight-meals/    Crockpot recipes: https://www.Simple Lifeforms.me/25-bariatric-friendly-crockpot-recipes/    https://One Diary.Imit[employer]/recipes/     https://www.[employer]idRetail.Imitix/mbd-recipes    The World According to EggSpeedshape Blog: https://theworldaccordingtoeggface.TriggerMail.com/            Time spent with patient: 17 minutes.  Kanwal Hunter RD, LD  "

## 2022-09-06 ENCOUNTER — TELEPHONE (OUTPATIENT)
Dept: ENDOCRINOLOGY | Facility: CLINIC | Age: 37
End: 2022-09-06

## 2022-09-06 ENCOUNTER — VIRTUAL VISIT (OUTPATIENT)
Dept: ENDOCRINOLOGY | Facility: CLINIC | Age: 37
End: 2022-09-06
Payer: COMMERCIAL

## 2022-09-06 DIAGNOSIS — E66.9 OBESITY: ICD-10-CM

## 2022-09-06 DIAGNOSIS — E11.69 DIABETES MELLITUS TYPE 2 IN OBESE: ICD-10-CM

## 2022-09-06 DIAGNOSIS — Z71.3 NUTRITIONAL COUNSELING: Primary | ICD-10-CM

## 2022-09-06 DIAGNOSIS — E66.9 DIABETES MELLITUS TYPE 2 IN OBESE: ICD-10-CM

## 2022-09-06 PROCEDURE — 97803 MED NUTRITION INDIV SUBSEQ: CPT | Mod: 95 | Performed by: DIETITIAN, REGISTERED

## 2022-09-06 NOTE — TELEPHONE ENCOUNTER
Form sent to Renetta Arora PA-C in secure email. Filled out and signed by provider. Will fax back when in clinic 9/7/22.

## 2022-09-06 NOTE — LETTER
2022      TO: César Son  [address]  [place] [address]         Dear MsJoshua Son,    Here is the letter template to provide to your psychiatrist and psychologist (therapist) for support with weight loss surgery.     Dear Psychiatrist/Psychologist/Therapist:     We have a mutual patient, César Son, : 1985, with morbid obesity who is considering undergoing bariatric surgery.  A psychological evaluation is being done to see if this patient is cleared from a psychological perspective to undergo the elective surgery.  However, we need your assistance with a letter of support as outlined below. Your input is valuable and will affect our decision to hold or proceed with bariatric surgery.     This letter of support should outline:  1.        Summary of treatment to date.  2.        Treatment goals for before and after surgery that indicate mental health support and continuation of care.  3.        Any concerns regarding this patient s ability to follow through with treatment recommendations.    4.        If known, documentation of cessation of illicit drug use (our policy requires patients to be drug free of illicit drugs for at least one year prior to surgery).   5.        A statement that indicates if you support or do not support this patient for weight loss surgery.     If you have any questions, feel free to contact us via our Call Center at [phone].  Please fax the evaluation to [phone] or route to Elinor Carlisle RN via Epic.     Sincerely,     MD Patric Gregorio MD Eric Wise, MD Kristi Kopacz, PA-C Kayla Gish, CNP     Mailing Address:  [facility]  [address], [facility] [address], [place] [address]  [email or web address]

## 2022-09-06 NOTE — TELEPHONE ENCOUNTER
Beverly from [facility] called to check the status of the form that was submitted on the patient's behalf. The form was scanned into the system on 8/7. Pt is requesting to switch pharmacies and the form needs to be completed in order for this to be done. Please call [facility] back to give status update.    [phone]

## 2022-09-06 NOTE — PATIENT INSTRUCTIONS
"Darryn Lindsey    Follow-up with RD on 10/6    Thank you,    Kanwal Hunter, RD, LD  If you would like to schedule or reschedule an appointment with the RD, please call [phone]    Nutrition Goals  1) Continue calorie/sugar-free hydration only. Consume 64+ oz fluid per day. Small, frequent sips all day. Do not drink with meals. Wait 30 mins after meal to drink again  2) Consume 3 servings of lean protein daily   - Lean protein sources: chicken/turkey without skin, fish, pork tenderloin, beef sirloin, shellfish, eggs, beans, lentils, tofu, tempeh, low-fat yogurt/cottage cheese, protein bar/shake (see list below for more)  3) Eat slowly (20-30 minutes per meal), chewing foods well (25 chews per bite/applesauce consistency).  4) Check weight on a regular basis. Consider getting a scale for home.    Diet Guidelines after Weight Loss Surgery  [email or web address]     Tools for after surgery:  QualiSystems Dish Sets: bariatric meal size bowls and plates with built in measurement designs on the dishes    Bariatric Pal: https://store.bariatricpal.com/collections/bariatric-dinnerware    Books:  \"Fresh Start Bariatric Cookbook\" by Briana Cerna, MS, RDN, CD - Excellent cookbook with post-op recipes and many other resources to check out for ongoing support after surgery    \"Eating Well After Weight Loss Surgery\" by Jojo Mclaughlin and Hank Sharma-Preethi - Great cookbook with recipes for each diet stage after surgery and recommended portion sizes. Slightly more intensive cooking recipes.    \"Bariatric Mindset Success\" by Ana M Ramirez - book that helps with prevention of weight regain after surgery. Helps train your brain for long term success with weight loss after surgery.    Post-Bariatric Surgery Online Recipe Resources:  Online:  Simpler Recipes: https://www.apprupt.com/bariatric-surgery/recipes  https://www.Fivetran.HLR Properties/bariatric-recipes/  https://bariatricmealProtection Plus.com/bariatric-recipes/   Some recipes on this site have " larger than 1 cup portion size pictures: http://www.[facility].org/weight-loss-surgery/nutrition/recipe-corner.html   https://www.OneWed (Formerly Nearlyweds).me/25-bariatric-friendly-weeknight-meals/  Crockpot recipes: https://www.OneWed (Formerly Nearlyweds).me/25-bariatric-friendly-crockpot-recipes/  https://Lenovo/recipes/   https://www.91 Boyuan Wireles/mbd-recipes  The World According to Eggface Blog: https://theworldaccordingtoeggface.NewCross Technologies.com/    Interested in working with a health ?  Health coaches work with you to improve your overall health and wellbeing.  They look at the whole person, and may involve discussion of different areas of life, including, but not limited to the four pillars of health (sleep, exercise, nutrition, and stress management). Discuss with your care team if you would like to start working a health .    Health Coaching-3 Pack:    $99 for three health coaching visits    Visits may be done in person or via phone    Coaching is a partnership between the  and the client; Coaches do not prescribe or diagnose    Coaching helps inspire the client to reach his/her personal goals      COMPREHENSIVE WEIGHT MANAGEMENT PROGRAM  VIRTUAL SUPPORT GROUPS    For Support Group Information:      We offer support groups for patients who are working on weight loss and considering, preparing for or have had weight loss surgery.   There is no cost for this opportunity.  You are invited to attend the?Virtual Support Groups?provided by any of the following locations:    [facility] via Awesome.me Teams with Katy Root RN  2.   [place] via Awesome.me Teams with Alhaji Blount, PhD, LP  3.   [place] via Awesome.me Teams with Jesica Banuelos RN  4.   [facility] via Awesome.me Teams with OLI [facility]    The following Support Group information can also be found on our website:  https://www.[facility].org/treatments/weight-loss-surgery-support-groups      [facility]  "Weight Loss Surgery Support Group    [facility] Weight Loss Surgery Support Group  The support group is a patient-lead forum that meets monthly to share experiences, encouragement and education. It is open to those who have had weight loss surgery, are scheduled for surgery, and those who are considering surgery.   WHEN: This group meets on the 3rd Wednesday of each month from 5:00PM - 6:00PM virtually using Microsoft Teams.   FACILITATOR: Led by Katy Root RD, LD, RN, the program's Clinical Coordinator.   TO REGISTER: Please contact the clinic via Thumb [place] or call the nurse line directly at [phone] to inform our staff that you would like an invite sent to you and to let us know the email you would like the invite sent to. Prior to the meeting, a link with directions on how to join the meeting will be sent to you.    2022 Meetings  January 19: \"Let's Talk\" a time for the group to share.  February 16: \"Let's Talk\" a time for the group to share.  March 16: Guest Speakers: Psychologists, Meghan Quiñones, PhD,LP and Carmella Thornton, PsyD,  April 20: Guest Speaker: Health , Tisha Puente, [facility],CHES, CPT  May 18: Guest Speaker: DietitianMarquez, ABBY, LP  Kimberley 15: \"Let's Talk\" a time for the group to share.  July 20: \"Let's Talk\" a time for the group to share.  August 17: TBA  September 21: TBA  October 19: Guest Speaker: Dr Nehemiah Figueroa MD Pulmonologist and Sleep Medicine Physician, \"Getting a Good Night's Sleep\".  November 16: TBA  December 21: TBA    [facility] Support Groups    Connections: Bariatric Care Support Group?  This is open to all [facility] (and those external to this program) pre- and post- operative bariatric surgery patients as well as their support system.   WHEN: This group meets the 2nd Tuesday of each month from 6:30 PM - 8:00 PM virtually using Microsoft Teams.   FACILITATOR: Led by Alhaji Blount, Ph.D who is a Licensed " "Psychologist with the [facility] Comprehensive Weight Management Program.   TO REGISTER: Please send an email to Alhaji Blount, Ph.D., LP at?[email or web address]?if you would like an invitation to the group and to learn about using Microsoft Teams.    2022 Meetings January 11: Shiloh Damon, PharmD, Pharmacy Resident at [facility], \"Medications and Bariatric Surgery\".  February 8: Open Forum  March 8  April 12  May 10  Kimberley 14    Connections: Post-Operative Bariatric Surgery Support Group  This is a support group for [facility] bariatric patients (and those external to [facility]) who have had bariatric surgery and are at least 3 months post-surgery.  WHEN: This support group meets the 4th Wednesday of the month from 11:00 AM - 12:00 PM virtually using Microsoft Teams.   FACILITATOR: Led by Certified Bariatric Nurse, Jesica Banuelos RN.   TO REGISTER: Please send an email to Jesica at [email or web address] if you would like an invitation to the group and to learn about using Microsoft Teams.    2022 Meetings  January 26  February 23  March 23  April 27  May 25  Kimberley 22    [facility] Healthy Lifestyle Virtual Support Group    Healthy Lifestyle Virtual Support Group?  This is 60 minutes of small group guided discussion, support and resources. All are welcome who want a healthy lifestyle.  WHEN: This group meets monthly on a Friday from 12:30 PM - 1:30 PM virtually using Microsoft Teams.   FACILITATOR: Led by National Board Certified Health and , Jesica Granado [facility].   TO REGISTER: Please send an email to Jesica at?[email or web address] to receive monthly invites to the group or if you have any questions about having a health .  Prior to the meeting, a link with directions on how to join the meeting will be sent to you.    2022 Meetings  MAY 20: OPEN FORUM  JUNE: 24:  Setting Limits and BoundariesJesica  July 29: OPEN " FORUM  August 26: Special Guest Registered Dietician Leanne Baptiste  Sept 30: OPEN FORUM  Oct 28, GraLudy Conway National Board Certified Health   Nov 18: Navigating How to Eat Around the Holidays with ABBY Nicole  Dec 16: Changing your relationship with movement with  Tisha National Board Certified Health

## 2022-09-06 NOTE — LETTER
2022      TO: César Son  [address]  [place] [address]         Dear Ms. César Son,    Here is the letter template to give to Sleep Medicine for their clearance for weight loss surgery.    Dear Sleep Medicine Provider:     Thank you for taking the time to see our mutual patient, César Son, : 1985, for a pre-operative clearance evaluation for bariatric surgery.  This patient is referred for clearance because they have known DILAN, have symptoms of DILAN or have a BMI > 50 per our program protocol.  Please assist us with the following during your evaluation of our mutual patient:                Initial patient evaluation             Arrange and expedite necessary testing             Guide and facilitate follow-up treatment             Provide a letter stating that the patient is cleared to proceed with bariatric surgery from a sleep medicine standpoint             Indicate what treatment is needed pre-surgery (if any), during inpatient  hospitalization and after surgery     The evaluation must confirm that the patient is stable from a sleep medicine standpoint to proceed with the surgery.  If you have any questions, feel free to contact us via our Call Center at [phone].  Please fax the evaluation to [phone] or route to Elinor Carlisle RN via Epic.     Sincerely,     MD Patric Gregorio MD Eric Wise, MD Kristi Kopacz, PA-C Kayla Gish, RODRICK  [facility]  [address], [facility] [address], [place] [address]  [email or web address]

## 2022-09-06 NOTE — LETTER
"9/6/2022       RE: César Son  [address]  [place] [address]     Dear Colleague,    Thank you for referring your patient, César Son, to the [facility] [place] at [facility]. Please see a copy of my visit note below.    César Son is a 36 year old female who is being evaluated via a billable telephone visit.     The patient has been notified of following:     \"This telephone visit will be conducted via a call between you and your physician/provider. We have found that certain health care needs can be provided without the need for a physical exam.  This service lets us provide the care you need with a short phone conversation.  If a prescription is necessary we can send it directly to your pharmacy.  If lab work is needed we can place an order for that and you can then stop by our lab to have the test done at a later time.    Telephone visits are billed at different rates depending on your insurance coverage. During this emergency period, for some insurers they may be billed the same as an in-person visit.  Please reach out to your insurance provider with any questions.    If during the course of the call the physician/provider feels a telephone visit is not appropriate, you will not be charged for this service.\"    Patient has given verbal consent for Telephone visit?  Yes    How would you like to obtain your AVS? Mail    Phone call duration: 17 minutes    During this virtual visit the patient is located in [place], patient verifies this as the location during the entirety of this visit.           Bariatric Nutrition Consultation Note    Reason For Visit: Nutrition Assessment    César Son is a 36 year old presenting today for return bariatric nutrition consult.   Pt is interested in laparoscopic sleeve gastrectomy with Dr. Slade.  Patient is accompanied by self.  This is pt's 4th of 6 required " "nutrition visits prior to surgery.     Pt referred by DILLON Vu on January 18, 2022.  Patient with Co-morbidities of obesity including:  Type II DM yes  Renal Failure no  Sleep apnea yes  Hypertension yes   Dyslipidemia no  Joint pain yes  Back pain yes  GERD no     Support System Reviewed With Patient 12/27/2021   Who do you have in your support network that can be available to help you for the first 2 weeks after surgery? Kid's father, kids   Who can you count on for support throughout your weight loss surgery journey? kid's father, kids       ANTHROPOMETRICS:  Estimated body mass index is 40.77 kg/m  as calculated from the following:    Height as of 12/27/21: 1.676 m (5' 6\").    Weight as of 12/27/21: 114.6 kg (252 lb 9.6 oz).    Current weight: 241 lbs BMI 39      Required weight loss goal pre-op: 10 lbs from initial consult weight (goal weight 242 lbs or less before surgery)       12/27/2021   I have tried the following methods to lose weight Watching portions or calories, Exercise, Slimfast, OTC Medications       Weight Loss Questions Reviewed With Patient 12/27/2021   How long have you been overweight? Since late teens through early 20's       SUPPLEMENT INFORMATION:  Vitamin D    NUTRITION HISTORY:  NKFA. Does not tolerate greasy foods, example pt provided - ground beef  Previous experiences with loss: diet gummies (just made her go to the bathroom more), increased exercise.   Usually not having breakfast, and going a while until eating full meal  Lunch is biggest meal.  She describes having new early satiety at meals in past couple months. Not feeling that hungry will eat whatever.  Working on cutting out pop - 3-4 cans per day, down from 5-6 cans per day.   She has 6 kids, 3-17 years old. 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   Often portioning out more food than she finds she can eat.     June 2022 - Continues to wean pop intake. Practicing portion control and increasing " fruit intake.     August 2022 - Focusing on eating less meat (upsetting her stomach lately). She eliminated pop intake. Notices she is less hungry throughout the day and needing less to feel full. Not eating after 8 pm. She is currently looking for new housing.    Today - Continues to avoid pop. Feeling puckett from meals. States she will be starting PT     Diet Recall:  Breakfast: toast + yogurt + occ plum/pineapple; salad; boiled eggs    Lunch: 1 sandwich (turkey/ham + light parekh on honey wheat) with fruit cup; cup noodles   Dinner: 5-6 pm spaghetti; diann; lasagna and salad  Beverages: Water (64+ oz.day), Gatorade zero     Progress Towards Weight Loss:   1) Continue calorie/sugar-free hydration only. Consume 64+ oz fluid per day. Small, frequent sips all day. - Met, contnues  2) Consume 3 servings of lean protein daily - Improving    - Lean protein sources: chicken/turkey without skin, fish, pork tenderloin, beef sirloin, shellfish, eggs, beans, lentils, tofu, tempeh, low-fat yogurt/cottage cheese, protein bar/shake (see list below for more)  3) Eat slowly (20-30 minutes per meal), chewing foods well (25 chews per bite/applesauce consistency) - Met, continues   4) Do not drink with meals. Wait 30 mins after meal to drink again - Not met   5) Have weight checked at primary care this month - Met, continues     Eating Habits 12/27/2021   Do you have any dietary restrictions? No   Do you currently binge eat (eat a large amount of food in a short time)? Yes - not recently, feeling puckett quickers   Are you an emotional eater? Yes - depressed    Do you get up to eat after falling asleep? Yes   What foods do you crave? 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  Walking a lot more in the summer.      Dining Out History Reviewed With Patient 12/27/2021   How often do you  dine out? Around once a week.   Where do you dine out? (select all that apply) sit-down restaurants, fast food chains, take out   What types of food do you order when you dine out? Salad, chicken, burgers, pizza, tacos       Physical Activity Reviewed With Patient 12/27/2021   How often do you exercise? Daily   What is the duration of your exercise (in minutes)? 45 Minutes   What types of exercise do you do? walking, home gym   What keeps you from being more active?  I am as active as I can possbily be         NUTRITION DIAGNOSIS:  Obesity r/t long history of positive energy balance aeb BMI >30 kg/m2. - continues    INTERVENTION:  Intervention Provided/Education Provided:  Reviewed post-op diet guidelines, ways to help prepare for post-op diet guidelines pre-operatively, portion/calorie-control, mindful eating and sources of protein.   Provided nutrition education on dietary mgmt of DM, including healthy food choice and how to create a balanced meal. Reviewed importance of establishing a consistent meal pattern, increasing lean protein intake, and then fruit/veggies portions at meals.  Reviewed surgery task list. Mailed letters for clearances to pt.  Patient demonstrates understanding. Mailed AVS.    Questions Reviewed With Patient 12/27/2021   How ready are you to make changes regarding your weight? Number 1 = Not ready at all to make changes up to 10 = very ready. 10   How confident are you that you can change? 1 = Not confident that you will be successful making changes up to 10 = very confident. 10       Expected Engagement: good     Follow-up: Scheduled for 10/6    GOALS:  1) Continue calorie/sugar-free hydration only. Consume 64+ oz fluid per day. Small, frequent sips all day. Do not drink with meals. Wait 30 mins after meal to drink again  2) Consume 3 servings of lean protein daily   - Lean protein sources: chicken/turkey without skin, fish, pork tenderloin, beef sirloin, shellfish, eggs, beans, lentils,  "tofu, tempeh, low-fat yogurt/cottage cheese, protein bar/shake (see list below for more)  3) Eat slowly (20-30 minutes per meal), chewing foods well (25 chews per bite/applesauce consistency).  4) Check weight on a regular basis. Consider getting a scale for home.    Diet Guidelines after Weight Loss Surgery  [email or web address] [email or web address]     Tools for after surgery:  Bellco Dish Sets: bariatric meal size bowls and plates with built in measurement designs on the dishes    Bariatric Pal: https://store.bariatricpal.IPM [place]/collections/bariatric-dinnerware    Books:  \"Fresh Start Bariatric Cookbook\" by Briana Cerna, MS, RDN, CD - Excellent cookbook with post-op recipes and many other resources to check out for ongoing support after surgery    \"Eating Well After Weight Loss Surgery\" by Jojo Mclaughlin and Hank Canales - Great cookbook with recipes for each diet stage after surgery and recommended portion sizes. Slightly more intensive cooking recipes.    \"Bariatric Mindset Success\" by Ana M Ramirez - book that helps with prevention of weight regain after surgery. Helps train your brain for long term success with weight loss after surgery.    Post-Bariatric Surgery Online Recipe Resources:  Online:    Simpler Recipes: https://www.CodeNgo/bariatric-surgery/recipes    https://www.PulseSocks.IPM [place]/bariatric-recipes/    https://bariatricmeMyPermissions.IPM [place]/bariatric-recipes/     Some recipes on this site have larger than 1 cup portion size pictures: http://www.muschealth.org/weight-loss-surgery/nutrition/recipe-corner.html     https://www.Seed&Spark.me/25-bariatric-friendly-weeknight-meals/    Crockpot recipes: https://www.foodOoploo.me/25-bariatric-friendly-crockpot-recipes/    https://documistic.IPM [place]/recipes/     https://www.Club Scene Network.IPM [place]/mbd-recipes    The World According to [facility] Blog: https://theworldIneda SystemsrdClearServerandigface.Aptus Endosystems.com/            Time spent with patient: 17 minutes.  Kanwal Hunter RD, " LD      Again, thank you for allowing me to participate in the care of your patient.      Sincerely,    Kanwal Hunter RD

## 2022-09-06 NOTE — LETTER
"2022      TO: César Son  [address]  [place] [address]         Dear Ms. César Son,    Here is the letter template to give to your primary care provider for letter of support with weight loss surgery.     Dear Primary Care Provider:     Thank you for coordinating with us to evaluate your patient, César Son, : 1985, for possible bariatric surgery. It is important to us that the primary care provider is aware of their patients' desire to have weight loss surgery and is supportive.     If you have not already submitted a referral with a clinic note indicating your support for this patient to have weight loss surgery, please enter a letter of support via Epic prior to the patient's next visit in our clinic.       Sample letter:     \"Dear [facility] Surgery Clinic,     I am the primary care provider for (patient name) and I support (him/her) having weight loss surgery.     Sincerely,     (provider name)\"     If patient meets criteria and clearances for surgery, we will submit to the insurance company for insurance approval and coordinate the needed appointments with our department.     If you have any questions, feel free to contact us via our Call Center at [phone].  Please fax the evaluation to [phone] or route to Elinor Carlisle RN via Epic.     Sincerely,     MD Patric Gregorio MD Eric Wise, MD Kayla Gish, CNP,  Renetta Arora, LEAH     Mailing Address:  [facility]  [address], [facility] [address], [place] [address]  Website: [email or web address]        "

## 2022-09-06 NOTE — LETTER
Date:September 7, 2022      Patient was self referred, no letter generated. Do not send.        [facility] Health Information

## 2022-09-25 ENCOUNTER — HEALTH MAINTENANCE LETTER (OUTPATIENT)
Age: 37
End: 2022-09-25

## 2022-10-06 ENCOUNTER — TELEPHONE (OUTPATIENT)
Dept: ENDOCRINOLOGY | Facility: CLINIC | Age: 37
End: 2022-10-06

## 2022-10-06 NOTE — TELEPHONE ENCOUNTER
Spoke with patient briefly on the phone today. This is her 5th of 6 required nutrition visits. She is currently admitted for pancreatitis. She thinks is related to cholelithiasis. She states pain started 2 weeks ago, and went to the hospital on Monday. She states prior she had been doing well with diet and weight loss. She has not weighed herself recently, but is going to ask they check her weight prior to leaving the hospital. She is currently on a liquid diet while hospitalized, reports receiving protein supplements. Recommended she ask for low calorie, low sugar protein supplements if available. She thinks she will be discharged in next day. She is still looking for new housing.     Time spent with pt: 5 mins    Kanwal Hunter RD, LD  Clinical Dietitian   Comprehensive Weight Management Program  [facility] [place]

## 2022-10-13 ENCOUNTER — TRANSFERRED RECORDS (OUTPATIENT)
Dept: HEALTH INFORMATION MANAGEMENT | Facility: CLINIC | Age: 37
End: 2022-10-13

## 2022-10-19 ENCOUNTER — TRANSFERRED RECORDS (OUTPATIENT)
Dept: HEALTH INFORMATION MANAGEMENT | Facility: CLINIC | Age: 37
End: 2022-10-19

## 2022-10-25 ENCOUNTER — TELEPHONE (OUTPATIENT)
Dept: ENDOCRINOLOGY | Facility: CLINIC | Age: 37
End: 2022-10-25

## 2022-10-25 NOTE — TELEPHONE ENCOUNTER
Pt would like to ask Renetta a few questions. Wouldn't say more    [phone]  **OK to leave detailed VM

## 2022-10-25 NOTE — TELEPHONE ENCOUNTER
Called and spoke with patient. She has questions about what she has left to complete prior to surgery. States she is waiting on clearance letters from her providers. Sent to [facility] to follow up with patient and update task list.

## 2022-10-26 ENCOUNTER — VIRTUAL VISIT (OUTPATIENT)
Dept: SLEEP MEDICINE | Facility: CLINIC | Age: 37
End: 2022-10-26
Attending: PHYSICIAN ASSISTANT
Payer: COMMERCIAL

## 2022-10-26 VITALS — HEIGHT: 66 IN | BODY MASS INDEX: 40.82 KG/M2 | WEIGHT: 254 LBS

## 2022-10-26 DIAGNOSIS — G47.33 OSA (OBSTRUCTIVE SLEEP APNEA): Primary | ICD-10-CM

## 2022-10-26 PROCEDURE — 99203 OFFICE O/P NEW LOW 30 MIN: CPT | Mod: 95 | Performed by: INTERNAL MEDICINE

## 2022-10-26 RX ORDER — ZOLPIDEM TARTRATE 5 MG/1
TABLET ORAL
Qty: 1 TABLET | Refills: 0 | Status: SHIPPED | OUTPATIENT
Start: 2022-10-26 | End: 2023-05-19

## 2022-10-26 ASSESSMENT — SLEEP AND FATIGUE QUESTIONNAIRES
HOW LIKELY ARE YOU TO NOD OFF OR FALL ASLEEP WHILE SITTING INACTIVE IN A PUBLIC PLACE: WOULD NEVER DOZE
HOW LIKELY ARE YOU TO NOD OFF OR FALL ASLEEP WHILE WATCHING TV: WOULD NEVER DOZE
HOW LIKELY ARE YOU TO NOD OFF OR FALL ASLEEP WHILE SITTING AND TALKING TO SOMEONE: WOULD NEVER DOZE
HOW LIKELY ARE YOU TO NOD OFF OR FALL ASLEEP WHILE LYING DOWN TO REST IN THE AFTERNOON WHEN CIRCUMSTANCES PERMIT: MODERATE CHANCE OF DOZING
HOW LIKELY ARE YOU TO NOD OFF OR FALL ASLEEP IN A CAR, WHILE STOPPED FOR A FEW MINUTES IN TRAFFIC: WOULD NEVER DOZE
HOW LIKELY ARE YOU TO NOD OFF OR FALL ASLEEP WHEN YOU ARE A PASSENGER IN A CAR FOR AN HOUR WITHOUT A BREAK: MODERATE CHANCE OF DOZING
HOW LIKELY ARE YOU TO NOD OFF OR FALL ASLEEP WHILE SITTING AND READING: SLIGHT CHANCE OF DOZING
HOW LIKELY ARE YOU TO NOD OFF OR FALL ASLEEP WHILE SITTING QUIETLY AFTER LUNCH WITHOUT ALCOHOL: WOULD NEVER DOZE

## 2022-10-26 NOTE — LETTER
10/26/2022         RE: César Son  [address]  [place] [address]        Dear Colleague,    Thank you for referring your patient, César Son, to the [facility] [place]. Please see a copy of my visit note below.    César is a 37 year old who is being evaluated via a billable video visit.      How would you like to obtain your AVS? MyChart  If the video visit is dropped, the invitation should be resent by: Text to cell phone: [phone]  Will anyone else be joining your video visit? Shawnee ThompsonLiberty Roland    Video-Visit Details    Video Start Time: 10:03 AM    Type of service:  Video Visit    Video End Time:10:19 AM    Originating Location (pt. Location): Home        Distant Location (provider location):  Off-site    Platform used for Video Visit: [employer]     Chief complaint: Consultation requested by Renetta Arora PA-C for reevaluation of obstructive sleep apnea    History of Present Illness: 37-year-old female with history of diabetes, obesity, recurrent pancreatitis.  She was diagnosed with mild sleep apnea in 2017 after presenting with snoring and daytime sleepiness.  She is not currently treated.  She is seeking out treatment for obesity and obesity related medical issues with bariatric surgery.  She denies significant sleep complaints at this time.  She does have back pain that it interferes with her sleep quality.  She is also been told by her children that she talks in her sleep.  However, there is no complaints of significant snoring or observed apnea at this time.  She denies waking up with shortness of breath, gasping, choking.  She sleeps in a regular flat bed.  She does have short sleep time.  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  She think she is getting about 6 hours of sleep at night.  She wakes up to get her kids off to school.  She will try to take a nap before her work shift.  Once she falls asleep she  typically will wake up once or twice a night to go to the bathroom and can return to sleep okay.  She does have some medication for anxiety and will take that sometimes before bed.  She also occasionally take muscle relaxant before bed.  She is not currently drinking any alcohol.  She is having problems with increased blood pressure.  No known family history of sleep apnea.  She has lost weight since her previous sleep study.    No no history of nightmares, sleepwalking, restless legs or dream enactment behavior.      [place] Sleepiness Scale   Sitting and reading: Slight chance of dozing   Watching TV: Would never doze   Sitting, inactive in a public place (e.g. a theatre or a meeting): Would never doze   As a passenger in a car for an hour without a break: Moderate chance of dozing   Lying down to rest in the afternoon when circumstances permit: Moderate chance of dozing   Sitting and talking to someone: Would never doze   Sitting quietly after a lunch without alcohol: Would never doze   In a car, while stopped for a few minutes in traffic: Would never doze   Total score - [place]: 5   (Less than 10 normal)    Insomnia Severity Scale  CASEY Total Score: 9  Total score categories:  0-7 = No clinically significant insomnia   8-14 = Subthreshold insomnia   15-21 = Clinical insomnia (moderate severity)  22-28 = Clinical insomnia (severe)      Past Medical History:   Diagnosis Date     Acute pancreatitis      HTN (hypertension)      Type 2 diabetes mellitus (H)        Allergies   Allergen Reactions     Oxycodone Rash       Current Outpatient Medications   Medication     acetaminophen (TYLENOL) 325 MG tablet     albuterol (PROAIR HFA/PROVENTIL HFA/VENTOLIN HFA) 108 (90 Base) MCG/ACT inhaler     amLODIPine (NORVASC) 5 MG tablet     aspirin (ASA) 81 MG chewable tablet     blood glucose ([employer] BLOOD GLUCOSE TEST) test strip     buPROPion (WELLBUTRIN SR) 150 MG 12 hr tablet     Cholecalciferol (VITAMIN D-3) 125 MCG (5000 UT)  "TABS     cholecalciferol 50 MCG (2000 UT) CAPS     cyclobenzaprine (FLEXERIL) 5 MG tablet     hydrOXYzine (VISTARIL) 50 MG capsule     ibuprofen (ADVIL/MOTRIN) 600 MG tablet     metFORMIN (GLUCOPHAGE) 1000 MG tablet     naproxen (NAPROSYN) 500 MG tablet     NIFEdipine ER (ADALAT CC) 30 MG 24 hr tablet     polyethylene glycol (MIRALAX) 17 GM/Dose powder     potassium chloride ER (KLOR-CON M) 20 MEQ CR tablet     senna (SENOKOT) 8.6 MG tablet     topiramate (TOPAMAX) 25 MG tablet     zolpidem (AMBIEN) 5 MG tablet     HYDROmorphone (DILAUDID) 2 MG tablet     No current facility-administered medications for this visit.       Social History     Socioeconomic History     Marital status: Single     Spouse name: Not on file     Number of children: Not on file     Years of education: Not on file     Highest education level: Not on file   Occupational History     Not on file   Tobacco Use     Smoking status: Never     Smokeless tobacco: Not on file   Vaping Use     Vaping Use: Never used   Substance and Sexual Activity     Alcohol use: Not Currently     Drug use: Not Currently     Sexual activity: Not on file   Other Topics Concern     Not on file   Social History Narrative     Not on file     Social Determinants of Health     Financial Resource Strain: Not on file   Food Insecurity: Not on file   Transportation Needs: Not on file   Physical Activity: Not on file   Stress: Not on file   Social Connections: Not on file   Intimate Partner Violence: Not on file   Housing Stability: Not on file       Family History   Problem Relation Age of Onset     Dementia Mother          EXAM:  Ht 1.676 m (5' 6\")   Wt 115.2 kg (254 lb)   BMI 41.00 kg/m    GENERAL: Alert and no distress  EYES: Eyes grossly normal to inspection.  No discharge or erythema, or obvious scleral/conjunctival abnormalities.  RESP: No audible wheeze, cough, or visible cyanosis.  No visible retractions or increased work of breathing.    SKIN: Visible skin clear. No " significant rash, abnormal pigmentation or lesions.  NEURO: Cranial nerves grossly intact.  Mentation and speech appropriate for age.  PSYCH: Mentation appears normal, affect normal, judgement and insight intact, normal speech and appearance well-groomed.       PSG 1/9/2017 [facility]  Weight 300 lbs  AHI 7.8, RDI 13.4, Lowest O2 sat 84%    No results found for: TSH      ASSESSMENT:  37-year-old female with history of obesity, diabetes, hypertension and obstructive sleep apnea.  Sleep apnea was noted to be mild in 2017.  She was at a higher weight at that time.  Identifying significant sleep apnea and treating it prior to bariatric surgery can reduce the risks of anesthesia.    PLAN:  Recommended reevaluation of underlying sleep disordered breathing given that its been 5 years.  If no significant sleep apnea or if sleep apnea remains mild patient should be able to be cleared for bariatric surgery.  If sleep apnea moderate or severe consider treatment prior to bariatric surgery.  Patient was provided a prescription for 1 tablet of sleep aid to bring to the lab and take at lights out.  I will be contacting her with results of the sleep study via [employer] when they become available.  She is agreeable with this plan.      36 Minutes spent on the date of the encounter doing chart review, history and exam, documentation and further activities per the note    Yumiko Denny M.D.  Pulmonary/Critical Care/Sleep Medicine    [facility]   [address], [address]   [address] [place] [address]. [place] [address]   Appointments: [phone]    The above note was dictated using voice recognition software and may include typographical errors. Please contact the author for any clarifications.                    Again, thank you for allowing me to participate in the care of your patient.        Sincerely,        Yumiko Denny MD

## 2022-10-26 NOTE — PROGRESS NOTES
César is a 37 year old who is being evaluated via a billable video visit.      How would you like to obtain your AVS? MyChart  If the video visit is dropped, the invitation should be resent by: Text to cell phone: [phone]  Will anyone else be joining your video visit? Shawnee Watkins    Video-Visit Details    Video Start Time: 10:03 AM    Type of service:  Video Visit    Video End Time:10:19 AM    Originating Location (pt. Location): Home        Distant Location (provider location):  Off-site    Platform used for Video Visit: [facility]     Chief complaint: Consultation requested by Renetta Arora PA-C for reevaluation of obstructive sleep apnea    History of Present Illness: 37-year-old female with history of diabetes, obesity, recurrent pancreatitis.  She was diagnosed with mild sleep apnea in 2017 after presenting with snoring and daytime sleepiness.  She is not currently treated.  She is seeking out treatment for obesity and obesity related medical issues with bariatric surgery.  She denies significant sleep complaints at this time.  She does have back pain that it interferes with her sleep quality.  She is also been told by her children that she talks in her sleep.  However, there is no complaints of significant snoring or observed apnea at this time.  She denies waking up with shortness of breath, gasping, choking.  She sleeps in a regular flat bed.  She does have short sleep time.  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  She think she is getting about 6 hours of sleep at night.  She wakes up to get her kids off to school.  She will try to take a nap before her work shift.  Once she falls asleep she typically will wake up once or twice a night to go to the bathroom and can return to sleep okay.  She does have some medication for anxiety and will take that sometimes before bed.  She also occasionally take muscle relaxant before bed.  She is not currently drinking  any alcohol.  She is having problems with increased blood pressure.  No known family history of sleep apnea.  She has lost weight since her previous sleep study.    No no history of nightmares, sleepwalking, restless legs or dream enactment behavior.      [place] Sleepiness Scale   Sitting and reading: Slight chance of dozing   Watching TV: Would never doze   Sitting, inactive in a public place (e.g. a theatre or a meeting): Would never doze   As a passenger in a car for an hour without a break: Moderate chance of dozing   Lying down to rest in the afternoon when circumstances permit: Moderate chance of dozing   Sitting and talking to someone: Would never doze   Sitting quietly after a lunch without alcohol: Would never doze   In a car, while stopped for a few minutes in traffic: Would never doze   Total score - [place]: 5   (Less than 10 normal)    Insomnia Severity Scale  CASEY Total Score: 9  Total score categories:  0-7 = No clinically significant insomnia   8-14 = Subthreshold insomnia   15-21 = Clinical insomnia (moderate severity)  22-28 = Clinical insomnia (severe)      Past Medical History:   Diagnosis Date     Acute pancreatitis      HTN (hypertension)      Type 2 diabetes mellitus (H)        Allergies   Allergen Reactions     Oxycodone Rash       Current Outpatient Medications   Medication     acetaminophen (TYLENOL) 325 MG tablet     albuterol (PROAIR HFA/PROVENTIL HFA/VENTOLIN HFA) 108 (90 Base) MCG/ACT inhaler     amLODIPine (NORVASC) 5 MG tablet     aspirin (ASA) 81 MG chewable tablet     blood glucose ([employer] BLOOD GLUCOSE TEST) test strip     buPROPion (WELLBUTRIN SR) 150 MG 12 hr tablet     Cholecalciferol (VITAMIN D-3) 125 MCG (5000 UT) TABS     cholecalciferol 50 MCG (2000 UT) CAPS     cyclobenzaprine (FLEXERIL) 5 MG tablet     hydrOXYzine (VISTARIL) 50 MG capsule     ibuprofen (ADVIL/MOTRIN) 600 MG tablet     metFORMIN (GLUCOPHAGE) 1000 MG tablet     naproxen (NAPROSYN) 500 MG tablet      "NIFEdipine ER (ADALAT CC) 30 MG 24 hr tablet     polyethylene glycol (MIRALAX) 17 GM/Dose powder     potassium chloride ER (KLOR-CON M) 20 MEQ CR tablet     senna (SENOKOT) 8.6 MG tablet     topiramate (TOPAMAX) 25 MG tablet     zolpidem (AMBIEN) 5 MG tablet     HYDROmorphone (DILAUDID) 2 MG tablet     No current facility-administered medications for this visit.       Social History     Socioeconomic History     Marital status: Single     Spouse name: Not on file     Number of children: Not on file     Years of education: Not on file     Highest education level: Not on file   Occupational History     Not on file   Tobacco Use     Smoking status: Never     Smokeless tobacco: Not on file   Vaping Use     Vaping Use: Never used   Substance and Sexual Activity     Alcohol use: Not Currently     Drug use: Not Currently     Sexual activity: Not on file   Other Topics Concern     Not on file   Social History Narrative     Not on file     Social Determinants of Health     Financial Resource Strain: Not on file   Food Insecurity: Not on file   Transportation Needs: Not on file   Physical Activity: Not on file   Stress: Not on file   Social Connections: Not on file   Intimate Partner Violence: Not on file   Housing Stability: Not on file       Family History   Problem Relation Age of Onset     Dementia Mother          EXAM:  Ht 1.676 m (5' 6\")   Wt 115.2 kg (254 lb)   BMI 41.00 kg/m    GENERAL: Alert and no distress  EYES: Eyes grossly normal to inspection.  No discharge or erythema, or obvious scleral/conjunctival abnormalities.  RESP: No audible wheeze, cough, or visible cyanosis.  No visible retractions or increased work of breathing.    SKIN: Visible skin clear. No significant rash, abnormal pigmentation or lesions.  NEURO: Cranial nerves grossly intact.  Mentation and speech appropriate for age.  PSYCH: Mentation appears normal, affect normal, judgement and insight intact, normal speech and appearance well-groomed.   "     PSG 1/9/2017 [facility]  Weight 300 lbs  AHI 7.8, RDI 13.4, Lowest O2 sat 84%    No results found for: TSH      ASSESSMENT:  37-year-old female with history of obesity, diabetes, hypertension and obstructive sleep apnea.  Sleep apnea was noted to be mild in 2017.  She was at a higher weight at that time.  Identifying significant sleep apnea and treating it prior to bariatric surgery can reduce the risks of anesthesia.    PLAN:  Recommended reevaluation of underlying sleep disordered breathing given that its been 5 years.  If no significant sleep apnea or if sleep apnea remains mild patient should be able to be cleared for bariatric surgery.  If sleep apnea moderate or severe consider treatment prior to bariatric surgery.  Patient was provided a prescription for 1 tablet of sleep aid to bring to the lab and take at lights out.  I will be contacting her with results of the sleep study via [employer] when they become available.  She is agreeable with this plan.      36 Minutes spent on the date of the encounter doing chart review, history and exam, documentation and further activities per the note    Yumiko Denny M.D.  Pulmonary/Critical Care/Sleep Medicine    [facility]   Floor 1, [address]   [address] [place], [place] [address] [address]   Appointments: [phone]    The above note was dictated using voice recognition software and may include typographical errors. Please contact the author for any clarifications.

## 2022-10-26 NOTE — PATIENT INSTRUCTIONS
"You will get a call to schedule an in lab sleep study.    I have also sent a prescription to your pharmacy.  Please  the prescription for 5 mg of zolpidem and hold onto it.  Bring it with you to the sleep lab for the sleep study.  Do not take it until \" lights out\".  "

## 2022-10-27 ENCOUNTER — TELEPHONE (OUTPATIENT)
Dept: SLEEP MEDICINE | Facility: CLINIC | Age: 37
End: 2022-10-27

## 2022-12-29 ENCOUNTER — CARE COORDINATION (OUTPATIENT)
Dept: ENDOCRINOLOGY | Facility: CLINIC | Age: 37
End: 2022-12-29

## 2022-12-29 NOTE — PROGRESS NOTES
Tasklist updated and sent to patient via [employer]    Bariatric Task List  Fax:  Please fax all paperwork to: [phone] -     Status:  Is patient a candidate for bariatric surgery?:    -     Cleared to schedule surgeon consult?:    - After psychological evaluation and letters of support from therapist and psychiatrist. bks   Status:  surgery evaluation in process -     Surgeon: Yany -     Emmaative surgery month/year: TBD -        Insurance: Insurance:  [facility] -        Patient Info: Initial Weight:  252 -     Date of Initial Weight/Height:  12/27/2021 -     Goal Weight (lbs):  242 -     Required Weight Loss:  10 -     Surgery Type:  sleeve gastrectomy -        Dietician Visits: Structured weight loss required by insurance?:  structured weight loss required -     Dietician Visit 1:  Completed - 1/20/22. bks   Dietician Visit 2:  Completed - 6/30/22 KB   Dietician Visit 3:  Completed - 8/5/22 KB   Dietician Visit 4:  Completed - 9/6/22 KB   Dietician Visit 5:  Needed -     Dietician Visit 6:  Needed -     Dietician Visit additional:  Needed - Monthly until surgery for weight loss and postop diet teaching. bks   Clearance from dietician to see surgeon?:    -     Dietician Notes:  Call [phone] to schedule monthly dietitian visits. bks -        Psychological Evaluation: Psych eval:  Needed - 10/17/22, 10/24/22 (ask if there is a 3rd appointment- usually there is 3 appointments). bks   Therapist letter of support:  Needed -     Psychiatrist letter of support:  Needed -     Establish care with therapist:  Needed -     Other:  establish with psychiatry - Seeing Carlo Guzman at [facility], last visit 7/7/22 - KB      Lab Work: Complete Blood Count:  Completed -     Comprehensive Metabolic Panel:  Completed -     Vitamin D:  Completed -     PTH:  Completed -     Hgb A1c:  Completed -      Lipids: Completed -      TSH ([facility], [facility], [place]): Completed - 12/14/21. bks      Consults/ Clearance Sleep Medicine:  " Needed - Call [phone] to schedule.10/26/22 Yumiko Hawkins MD appt.  Need sleep study to determine clearance.  stephanie   Medical Weight Management: Completed -        Testing: Sleep Study: Needed - Requested by Dr Hawkins. bks      PCP: Establish care with PCP:  Completed -     Follow up with PCP:    -     PCP letter of support:  Needed -        Stopping Smoking/ Alcohol Use: Quit tobacco use (3 months smoke free)?:    -     Quit date:    -     Quit alcohol use: Completed -    Quit date:   -  September 2022      Patient Education:  Information Session:  Completed -     Given \"Making your decision\" handout?:  Yes -     Given \"A Roadmap to you Weight Loss Surgery\" handout?: Yes -     Given \"Get Well Loop\" information?: Yes -     Given support group information?:    -     Attended support group?:    -     Support plan in place?:  Completed -        Final Tasks - to do these after above tasks are done and surgery date is decided:  Before surgery online class:  Needed -     Before surgery online class website link:  [email or web address]   After surgery online class:  Needed -     After surgery online class website link:  [email or web address]   Nurse visit per clinic:  Needed -     History and Physical per clinic:   -  PreAssessment clinic. stephanie   Final labs per clinic: Needed -     Other:   -  Schedule 1 week and 1 month appts. stephanie      Notes:   -            "

## 2023-01-11 ENCOUNTER — TRANSFERRED RECORDS (OUTPATIENT)
Dept: HEALTH INFORMATION MANAGEMENT | Facility: CLINIC | Age: 38
End: 2023-01-11

## 2023-01-19 ENCOUNTER — TRANSFERRED RECORDS (OUTPATIENT)
Dept: HEALTH INFORMATION MANAGEMENT | Facility: CLINIC | Age: 38
End: 2023-01-19

## 2023-01-26 ENCOUNTER — TRANSFERRED RECORDS (OUTPATIENT)
Dept: HEALTH INFORMATION MANAGEMENT | Facility: CLINIC | Age: 38
End: 2023-01-26
Payer: COMMERCIAL

## 2023-01-30 ENCOUNTER — HEALTH MAINTENANCE LETTER (OUTPATIENT)
Age: 38
End: 2023-01-30

## 2023-02-07 ENCOUNTER — CARE COORDINATION (OUTPATIENT)
Dept: ENDOCRINOLOGY | Facility: CLINIC | Age: 38
End: 2023-02-07
Payer: COMMERCIAL

## 2023-02-07 ENCOUNTER — TELEPHONE (OUTPATIENT)
Dept: SURGERY | Facility: CLINIC | Age: 38
End: 2023-02-07

## 2023-02-07 NOTE — PROGRESS NOTES
Tasklist updated reviewed with patient over the phone and sent to patient via [employer].    Bariatric Task List  Fax:  Please fax all paperwork to: [phone] -     Status:  Is patient a candidate for bariatric surgery?:    -     Cleared to schedule surgeon consult?:    - After psychological evaluation and letters of support from therapist and psychiatrist. bks   Status:  surgery evaluation in process -     Surgeon: Yany -     Emmaative surgery month/year: TBD -        Insurance: Insurance:  [facility] -        Patient Info: Initial Weight:  252 -     Date of Initial Weight/Height:  12/27/2021 -     Goal Weight (lbs):  242 -     Required Weight Loss:  10 -     Surgery Type:  sleeve gastrectomy -        Dietician Visits: Structured weight loss required by insurance?:  structured weight loss required -     Dietician Visit 1:  Completed - 1/20/22. [facility]   Dietician Visit 2:  Completed - 6/30/22    Dietician Visit 3:  Completed - 8/5/22    Dietician Visit 4:  Completed - 9/6/22    Dietician Visit 5:  Needed -     Dietician Visit 6:  Needed -     Dietician Visit additional:  Needed - Monthly until surgery for weight loss and postop diet teaching. [facility]   Dietician Notes:  Call [phone] to schedule monthly dietitian visits. [facility] -        Psychological Evaluation: Psych eval:  Needed - 10/17/22, 10/24/22 (ask if there is a 3rd appointment- usually there is 3 appointments). [facility]   Therapist letter of support:  Needed -     Psychiatrist letter of support:  Needed -     Establish care with therapist:  Needed -     Other:  establish with psychiatry - Seeing Carlo Guzman at [facility], last visit 7/7/22 -       Lab Work: Complete Blood Count:  Completed -     Comprehensive Metabolic Panel:  Completed -     Vitamin D:  Completed -     PTH:  Completed -     Hgb A1c:  Completed -      Lipids: Completed -      TSH ([facility], [place]): Completed - 12/14/21. bks      Consults/ Clearance Sleep Medicine:  Needed -  "Call [phone] to schedule.10/26/22 Yumiko Hawkins MD appt.  Need sleep study to determine clearance.  stephanie   Medical Weight Management: Completed -        Testing: Sleep Study: Needed - Requested by Dr Hawkins. edgars      PCP: Establish care with PCP:  Completed -     Follow up with PCP:    -     PCP letter of support:  Needed -        Stopping Smoking/ Alcohol Use: Quit alcohol use: Completed -    Quit date:   -        Patient Education:  Information Session:  Completed -     Given \"Making your decision\" handout?:  Yes -     Given \"A Roadmap to you Weight Loss Surgery\" handout?: Yes -     Given \"Get Well Loop\" information?: Yes -     Given support group information?:    -  Yes   Attended support group?:    -     Support plan in place?:  Completed -        Final Tasks:  Before surgery online class:  Needed -     Before surgery online class website link:  [email or web address]   After surgery online class:  Needed -     After surgery online class website link:  [email or web address]   Nurse Preop Class:  Needed -     History and Physical per clinic:   -  PreAssessment Clinic   Final labs per clinic: Needed -     Chest xray per clinic:   -     Electrocardiogram (ECG) per clinic:   -     Other:   -        Notes:   -            "

## 2023-02-07 NOTE — TELEPHONE ENCOUNTER
Patient Returning Call    Reason for call:  Discuss surg        Patient has additional questions:  Yes    What are your questions/concerns:  Pt would like to know surg scheduling status    [facility] or would you prefer to receive a phone call?:   Patient would prefer a phone call   Okay to leave a detailed message?: Yes at Cell number on file:    Telephone Information:   Mobile [phone]

## 2023-02-14 ENCOUNTER — TRANSFERRED RECORDS (OUTPATIENT)
Dept: HEALTH INFORMATION MANAGEMENT | Facility: CLINIC | Age: 38
End: 2023-02-14
Payer: COMMERCIAL

## 2023-02-14 ENCOUNTER — MEDICAL CORRESPONDENCE (OUTPATIENT)
Dept: HEALTH INFORMATION MANAGEMENT | Facility: CLINIC | Age: 38
End: 2023-02-14
Payer: COMMERCIAL

## 2023-02-20 ENCOUNTER — ALLIED HEALTH/NURSE VISIT (OUTPATIENT)
Dept: RESEARCH | Facility: CLINIC | Age: 38
End: 2023-02-20
Payer: COMMERCIAL

## 2023-02-20 VITALS
WEIGHT: 250 LBS | SYSTOLIC BLOOD PRESSURE: 180 MMHG | HEIGHT: 66 IN | OXYGEN SATURATION: 100 % | DIASTOLIC BLOOD PRESSURE: 99 MMHG | BODY MASS INDEX: 40.18 KG/M2 | RESPIRATION RATE: 16 BRPM | HEART RATE: 80 BPM

## 2023-02-20 DIAGNOSIS — Z00.6 EXAMINATION OF PARTICIPANT OR CONTROL IN CLINICAL RESEARCH: Primary | ICD-10-CM

## 2023-02-20 PROCEDURE — 99207 PR NO CHARGE NURSE ONLY: CPT

## 2023-02-20 NOTE — PROGRESS NOTES
"  [place] Sleep Study Inclusion/Exclusion Criteria:    Study Name: [place]  : Avelino Mon MD    Study Description: The purpose of this study is to collect sleep data for product research and development. We will compare the performance of the Smartwatch to a medical-grade Home Sleep Test (HST). We hope to learn whether the Smartwatch can be used to track sleep quality at home.    Protocol Version: 3.0  22-DEC-2022     Criteria #  Inclusion Criteria (ALL MUST BE YES)  YES/NO/N/A   1  Adult (age > 18 years old) Yes   2  Subject has a reliable WiFi network (examples of \"reliable\": able to video-conference call with a clear image, able to stream movies or video without interruption, play online computer games) Yes   3  Subject has access to a computer or smartphone with audiovisual capabilities (e.g., webcam and microphone/speaker*)  Yes   4  Subject is willing to comply with the study procedures    Yes         * applicable for subjects that are remotely consented and/or trained.     Criteria # Exclusion Criteria (ALL MUST BE NO) YES/NO/N/A   1  Active COVID infection   No   2  Decisionally impaired participants (no surrogate consenting is allowed)    No   3  Not understanding written and spoken English     No   4  Open wounds(s) on the wrist and/or forearm (in area where Smartwatch would be worn) No   5  Tattoos, large moles or scars on the dorsal aspect of wrist where the Smartwatch would be worn; individuals with tattoo on only one wrist may participate, if Smartwatch is worn on non-tattooed wrist    No   6  Known sensitivity or allergy to component of the Smartwatch   No   7  Planned travel across 2 or more times zones during study participation   No   8  Planned travel away from home for more than 5 days during the study period No   9  Overnight rotating shift work     No   10  Active and adherent to treatment for sleep apnea   (e.g., CPAP, dental appliance, Hypoglossal nerve stimulator)    " No   11  Plans to start treatment for apnea within the study timeframe    No   12  Overnight supplemental oxygen use   No   13    Employed by a company that develops or sells medical and/or fitness devices (e.g., ECG monitors, wearable fitness bands, sleep monitors, etc.) or are technology journalists (e.g., professional bloggers, TV, magazine, newspaper reporters, etc.) No   14    Participated in a previous sleep study that used a wrist-worn sensor device by same sponsor  No     Patient does fulfill study inclusion criteria and no exclusion criteria are found.     Avelino Mon MD    20-FEB-2023    Che Deshpande

## 2023-02-20 NOTE — PROGRESS NOTES
[facility] In-Person Study Note    Study Description: The purpose of this study is to collect sleep data for product research and development. We will compare the performance of the Smartwatch to a medical-grade Home Sleep Test (HST). We hope to learn whether the Smartwatch can be used to track sleep quality at home.       Subject ID:      SCREENING     Demographic Info  César Son   1985          37 year old  female    Race: Black or   Ethnicity: Non-/     Alexis Scale: OBAN Alexis: 5    Medical History:  Past Medical History:   Diagnosis Date     Anxiety/Depression      HTN (hypertension)      Type 2 diabetes mellitus (H)        Sleep Apnea Diagnosis: Yes    Allergies:  Allergies   Allergen Reactions     Oxycodone Rash        Current Medications:     Review of your medicines          Accurate as of February 20, 2023  9:26 AM. If you have any questions, ask your nurse or doctor.            CONTINUE these medicines which have NOT CHANGED      Dose / Directions   albuterol 108 (90 Base) MCG/ACT inhaler  Commonly known as: PROAIR HFA/PROVENTIL HFA/VENTOLIN HFA   Indication: Obstructive sleep apnea    Dose: 2 puff  Inhale 2 puffs into the lungs  Start Date: 3/22/21; Ongoing    amLODIPine 5 MG tablet  Commonly known as: NORVASC   Indication: hypertension    Dose: 5 mg  Take 5 mg by mouth  Start Date: 5/11/21; Ongoing    aspirin 81 MG chewable tablet  Commonly known as: ASA   Indication: hypertension    Dose: 1 tablet  Take 1 tablet by mouth daily  Start Date: 10/26/21; Ongoing      buPROPion 150 MG 12 hr tablet  Commonly known as: WELLBUTRIN SR   Indication: Depression   Dose: 150 mg  Take 150 mg by mouth  Start Date: 11/19/21; Ongoing    * cholecalciferol 50 MCG (2000 UT) Caps   Indication: Supplement   Dose: 2,000 Units  Take 2,000 Units by mouth  Start Date: 6/29/21; Ongoing      hydrOXYzine 50 MG capsule  Commonly known as: VISTARIL   Indication: Anxiety    "Dose: 50 mg  Take 50 mg by mouth  Start Date: 11/19/21; Ongoing      metFORMIN 1000 MG tablet  Commonly known as: GLUCOPHAGE  Indication: Type 2 diabetes mellitus     Dose: 1,000 mg  Take 1,000 mg by mouth  Start Date: 6/29/21; Ongoing      naproxen 500 MG tablet  Commonly known as: NAPROSYN  Indication: Pain relief      Dose: 500 mg  Take 500 mg by mouth  Start Date: 5/11/21; Ongoing      NIFEdipine ER 30 MG 24 hr tablet  Commonly known as: ADALAT CC     Indication: hypertension  Dose: 1 tablet  Take 1 tablet by mouth daily  Start Date: 11/19/21; Ongoing     potassium chloride ER 20 MEQ CR tablet  Commonly known as: KLOR-CON M     Indication: supplement  Dose: 1 tablet  Take 1 tablet by mouth daily  Start Date: 11/19/21; Ongoing      topiramate 25 MG tablet  Commonly known as: TOPAMAX  Used for: Class 3 severe obesity due to excess calories with serious comorbidity and body mass index (BMI) of 40.0 to 44.9 in adult (H)      25mg at bedtime for week 1, 50mg at bedtime for 1 week, and 75mg at bedtime thereafter  Start Date: 4/11/22 Ongoing      zolpidem 5 MG tablet  Commonly known as: Ambien  Used for: DILAN (obstructive sleep apnea)      Take tablet by mouth 15 minutes prior to sleep, for Sleep Study  Start Date: 10/26/22; Ongoing          * This list has 2 medication(s) that are the same as other medications prescribed for you. Read the directions carefully, and ask your doctor or other care provider to review them with you.                Past Surgical History:  No past surgical history on file.           Vitals:  Time Subject Sat: 0852   BP (!) 180/99 (BP Location: Left arm, Patient Position: Sitting, Cuff Size: Adult Large)   Pulse 80   Resp 16   Ht 1.676 m (5' 6\")   Wt 113.4 kg (250 lb)   SpO2 100%   BMI 40.35 kg/m         Lifestyle:  Occupation: Currently unemployed    Do you have a Bed Partner/Co-Sleeper? Yes   Previous Sleep Study?: Yes       (If Yes) Initial AHI Score: 7.8    Alcohol Use: No  Smoking " History: No      Measurements & Preferences:  Dominant Hand: Right   Preferred Watch Wrist: Left    Left Wrist:  Circumference (mm): 177   Hairiness: A: Thin Hair, Low Density   Tattoos, Moles, Scars? Tattoo,    Right Wrist:   Circumference (mm): 178   Hairiness: A: Thin Hair, Low Density   Tattoos, Moles, Scars? Tattoo,    Was data collected?: Yes    ENROLLMENT & DEVICES      Device IDs:  Watch ID: [id number]    Watch Size: 44 mm   Band Size: M/L  Natural Notch: 4   Secure Notch: 4   Watch Setting Worn: 4   Phone ID: [id number]  Nox ID: [id number]     Has the Subject Enrolled in the Study [place]: Yes   Confirmation of Enrollment?: Yes   Enrollment Date: 20-FEB-2023  Smartwatch Training Completed? Yes   Smartwatch Training Date: 20-FEB-2023  HSAT Training Completed? Yes   HSAT Training Date: 20-FEB-2023 20-FEB-2023  Che Deshpande

## 2023-02-20 NOTE — PROGRESS NOTES
[place] Study Consent    Study Description: The purpose of this study is to collect sleep data for product research and development. We will compare the performance of the Smartwatch to a medical-grade Home Sleep Test (HST). We hope to learn whether the Smartwatch can be used to track sleep quality at home.        César Son a 37 year old female, was on-site  today to discuss participation in the [place] sleep data collection study.     The consent form was reviewed with the patient.     The review of the study included:    Study Purpose     COVID-19 Criteria     Participant Responsibilities      Study Data and Devices    Benefits and Risks of Participation    Compensation and Costs of Participation    Voluntary Participation    Confidentiality     Injury and Legal Rights    Protocol Version: 3.0    The subject was queried in regards to her willingness to continue and her questions were answered to her satisfaction.     The patient has given her agreement to volunteer and participate in the above noted study.     The Consent  and HIPAA form version 3.0 6-JAN-2023 was signed at 08:52  on  20-FEB-2023 with the [facility] of [facility].     A copy of the [place] consent will be placed in subject's medical record. A copy of the consent form was given to the subject today.    Study data is directly entered into Epic and [employer] per protocol.     No study procedures were done prior to César Son providing informed consent.       20-FEB-2023    Che Deshpande

## 2023-02-23 ENCOUNTER — VIRTUAL VISIT (OUTPATIENT)
Dept: RESEARCH | Facility: CLINIC | Age: 38
End: 2023-02-23
Payer: COMMERCIAL

## 2023-02-23 DIAGNOSIS — Z00.6 EXAMINATION OF PARTICIPANT OR CONTROL IN CLINICAL RESEARCH: Primary | ICD-10-CM

## 2023-02-23 PROCEDURE — 99207 PR NO CHARGE NURSE ONLY: CPT | Mod: 93

## 2023-02-23 NOTE — PROGRESS NOTES
[place] HSAT Phone Visit    Study Description: The purpose of this study is to collect sleep data for product research and development. We will compare the performance of the Smartwatch to a medical-grade Home Sleep Test (HST). We hope to learn whether the Smartwatch can be used to track sleep quality at home.      Subject Number:     Study Day: Day 4      Subject reports that they completed the first and second night of HSTs on the first and second day of enrollment (2/20 and 2/21).This is a protocol deviation. They report that the equipment got unplugged and fell off on the second night (2/21). Staff reeducated participant to continue wearing the watch overnight and completing the morning and evening surveys. Participant will bring in HST equipment tomorrow (2/24).        Are you on your home WiFi and have you been completing your Evening/Morning survey? Yes    Reminders Checklist:   [x]Complete Evening Task prior to HSAT Workflow/Watching the video   [x]Make sure Red light comes on, if not DO NOT ATTEMPT  [x] Secure nasal cannula and finger sensor with medical tape  [x] Nox recording turned off in the morning following HSAT night   [x] Ensure morning survey is completed for participant's data upload    Helpful Hints:  -Wear a T-shirt over the heart monitor   -Double tape device tubing/wires     Adverse Events & Con Med Assessment Performed?   [x]    (If yes, please generate adverse event report for PI to cosign)      23-FEB-2023    Sara Behmanesh

## 2023-02-24 ENCOUNTER — ALLIED HEALTH/NURSE VISIT (OUTPATIENT)
Dept: RESEARCH | Facility: CLINIC | Age: 38
End: 2023-02-24
Payer: COMMERCIAL

## 2023-02-24 DIAGNOSIS — Z00.6 EXAMINATION OF PARTICIPANT OR CONTROL IN CLINICAL RESEARCH: Primary | ICD-10-CM

## 2023-02-24 PROCEDURE — 99207 PR NO CHARGE NURSE ONLY: CPT

## 2023-02-24 NOTE — PROGRESS NOTES
. [place] HSAT Kit Return  Study Description: The purpose of this study is to collect sleep data for product research and development. We will compare the performance of the Smartwatch to a medical-grade Home Sleep Test (HST). We hope to learn whether the Smartwatch can be used to track sleep quality at home.      Subject Number:     Study Day: Day 4    Tracking Number: N/A    Compliance Questions:  Did you wear a T-Shirt over the heart monitor? Yes  Did you double tape device tubing/wires?Yes  Did you turn-off your heart monitor each morning after collection?Yes  Did all equipment function correctly and stay-on throughout night?Yes  Did you see the red light underneath the watch turn-on both nights?Yes    Completed HSAT on nights one and two, this is not a protocol deviation. HSAT was completed within the first 7days of the study.    Participant returned HSAT kit with all devices returned. Participant verbalized understanding that if their data is unusable per sponsor, they will conduct one additional set of HSAT recordings. All other questions/concerns addressed.     Adverse Events & Con Med Assessment Performed?   [x]     (If yes, please generate adverse event report for PI to cosign)    24-FEB-2023    MARIO ALBERTO Michelle

## 2023-03-02 ENCOUNTER — MYC MEDICAL ADVICE (OUTPATIENT)
Dept: ENDOCRINOLOGY | Facility: CLINIC | Age: 38
End: 2023-03-02

## 2023-03-02 ENCOUNTER — ALLIED HEALTH/NURSE VISIT (OUTPATIENT)
Dept: RESEARCH | Facility: CLINIC | Age: 38
End: 2023-03-02
Payer: COMMERCIAL

## 2023-03-02 DIAGNOSIS — Z00.6 EXAMINATION OF PARTICIPANT OR CONTROL IN CLINICAL RESEARCH: Primary | ICD-10-CM

## 2023-03-02 PROCEDURE — 99207 PR NO CHARGE NURSE ONLY: CPT

## 2023-03-02 NOTE — PROGRESS NOTES
[place] HSAT Redeployment Visit    Study Description: The purpose of this study is to collect sleep data for product research and development. We will compare the performance of the Smartwatch to a medical-grade Home Sleep Test (HST). We hope to learn whether the Smartwatch can be used to track sleep quality at home.      Subject Number:     Study Day: Week 3    [place] Study Subject was called today to inform them their data from their first set of HSAT nights was unusable thus necessitating another set of HSAT nights. Issues with participant's first set of HSAT recordings reviewed.      NEMOPTIC Tracking #: N/A  NOX ID: [id number]  Nonin ID: [id number]    Reeducation Checklist:   [x] Complete Evening Task prior to HSAT Workflow   [x] Secure nasal cannula and finger sense with medical tape  [x] Did you wear a second T-Shirt over the Nox?   [x] Nox recording turned off in the morning following HSAT night   [x] Ensure morning survey is completed for participant's data upload    Adverse Events & Con Med Assessment Performed?   [x]     (If yes, please generate adverse event report for PI to cosign)      2-MAR-2023     Che Deshpande

## 2023-03-02 NOTE — TELEPHONE ENCOUNTER
Patient called because she was confused about what else she had to do to get bariatric surgery. She has back problems so would like to get things completed. Reviewed tasklist with patient. Answered questions. Patient given number to schedule a dietician and an appointment with Renetta Arora PA-C as she has not been seen in a while.

## 2023-03-06 ENCOUNTER — VIRTUAL VISIT (OUTPATIENT)
Dept: RESEARCH | Facility: CLINIC | Age: 38
End: 2023-03-06
Payer: COMMERCIAL

## 2023-03-06 DIAGNOSIS — Z00.6 EXAMINATION OF PARTICIPANT OR CONTROL IN CLINICAL RESEARCH: Primary | ICD-10-CM

## 2023-03-06 PROCEDURE — 99207 PR NO CHARGE NURSE ONLY: CPT | Mod: 93

## 2023-03-06 NOTE — PROGRESS NOTES
" [place] Study Phone Visit    Study Description: The purpose of this study is to collect sleep data for product research and development. We will compare the performance of the Smartwatch to a medical-grade Home Sleep Test (HST). We hope to learn whether the Smartwatch can be used to track sleep quality at home.      Subject Number:     Study Day: Week 3    [place] Study Subject was called today to:    Review Compliance     Answer subject questions    Deliver study protocol reminders to subject    Reminders Checklist:   [x]  Connected to WiFi  [x]  Completing both morning and evening surveys  [x]  Watch and Phone on  near each other after completed morning survey   [x]  Take devices off before showering/getting them wet  [x]  Make sure to dismiss any update notifications. -Tap \"Not Now\"   [x]  Remind them of next appointment with us     Adverse Events & Con Med Assessment Performed?   [x]     (If yes, please generate adverse event report for PI to cosign)      6-MAR-2023    Sara Behmanesh      "

## 2023-03-06 NOTE — PROGRESS NOTES
[facility] HSAT Redeployment Kit Return  Study Description: The purpose of this study is to collect sleep data for product research and development. We will compare the performance of the Smartwatch to a medical-grade Home Sleep Test (HST). We hope to learn whether the Smartwatch can be used to track sleep quality at home.      Subject Number:     Study Day: Week 3    Tracking Number: N/A    Compliance Questions:  Did you wear a T-Shirt over the heart monitor? No   Did you double tape device tubing/wires?No   Did you turn-off your heart monitor each morning after collection?Yes  Did all equipment function correctly and stay-on throughout night?Yes  Did you see the red light underneath the watch turn-on both nights?Yes      Participant returned HSAT kit with all devices returned. Participant verbalized understanding that if their data is unusable per sponsor, they will conduct one additional set of HSAT recordings. All other questions/concerns addressed.     Adverse Events & Con Med Assessment Performed?   [x]     (If yes, please generate adverse event report for PI to cosign)    6-MAR-2023    Sara Behmanesh

## 2023-03-14 ENCOUNTER — VIRTUAL VISIT (OUTPATIENT)
Dept: RESEARCH | Facility: CLINIC | Age: 38
End: 2023-03-14
Payer: COMMERCIAL

## 2023-03-14 ENCOUNTER — TELEPHONE (OUTPATIENT)
Dept: ENDOCRINOLOGY | Facility: CLINIC | Age: 38
End: 2023-03-14

## 2023-03-14 DIAGNOSIS — Z00.6 EXAMINATION OF PARTICIPANT OR CONTROL IN CLINICAL RESEARCH: Primary | ICD-10-CM

## 2023-03-14 PROCEDURE — 99207 PR NO CHARGE NURSE ONLY: CPT | Mod: 93

## 2023-03-14 NOTE — PROGRESS NOTES
"César Son is a 37 year old female who is being evaluated via a billable video visit.      The patient has been notified of following:     \"This video visit will be conducted via a call between you and your physician/provider. We have found that certain health care needs can be provided without the need for an in-person physical exam.  This service lets us provide the care you need with a video conversation.  If a prescription is necessary we can send it directly to your pharmacy.  If lab work is needed we can place an order for that and you can then stop by our lab to have the test done at a later time.    Video visits are billed at different rates depending on your insurance coverage.  Please reach out to your insurance provider with any questions.    If during the course of the call the physician/provider feels a video visit is not appropriate, you will not be charged for this service.\"    Patient has given verbal consent for Video visit? Yes  How would you like to obtain your AVS? MyChart  If you are dropped from the video visit, the video invite should be resent to: Text to cell phone: [phone]  Will anyone else be joining your video visit? No  {If patient encounters technical issues they should call [phone]      Video-Visit Details    Type of service:  Video Visit    Video Start Time: 10:34 AM  Video End Time: 10:51 AM    Originating Location (pt. Location): Home    Distant Location (provider location):  Offsite (providers home) [facility] [place]     Platform used for Video Visit: Craneware    During this virtual visit the patient is located in [place], patient verifies this as the location during the entirety of this visit.           Bariatric Nutrition Consultation Note    Reason For Visit: Nutrition Assessment    César Son is a 36 year old presenting today for return bariatric nutrition consult.   Pt is interested in laparoscopic sleeve gastrectomy with Dr." "Yany.  Patient is accompanied by self.  This is pt's 5th of 6 required nutrition visits prior to surgery.     Pt referred by DILLON Vu on January 18, 2022.  Patient with Co-morbidities of obesity including:  Type II DM yes  Renal Failure no  Sleep apnea yes  Hypertension yes   Dyslipidemia no  Joint pain yes  Back pain yes  GERD no     Support System Reviewed With Patient 12/27/2021   Who do you have in your support network that can be available to help you for the first 2 weeks after surgery? Kid's father, kids   Who can you count on for support throughout your weight loss surgery journey? kid's father, kids       ANTHROPOMETRICS:  Estimated body mass index is 40.77 kg/m  as calculated from the following:    Height as of 12/27/21: 1.676 m (5' 6\").    Weight as of 12/27/21: 114.6 kg (252 lb 9.6 oz).    Current weight: Unknown, will have wt checked on 3/21 at pcp visit at [facility].      Required weight loss goal pre-op: 10 lbs from initial consult weight (goal weight 242 lbs or less before surgery)       12/27/2021   I have tried the following methods to lose weight Watching portions or calories, Exercise, Slimfast, OTC Medications       Weight Loss Questions Reviewed With Patient 12/27/2021   How long have you been overweight? Since late teens through early 20's       SUPPLEMENT INFORMATION:  Vitamin D    NUTRITION HISTORY:  NKFA. Does not tolerate greasy foods, example pt provided - ground beef  Previous experiences with loss: diet gummies (just made her go to the bathroom more), increased exercise.   Usually not having breakfast, and going a while until eating full meal  Lunch is biggest meal.  She describes having new early satiety at meals in past couple months. Not feeling that hungry will eat whatever.  Working on cutting out pop - 3-4 cans per day, down from 5-6 cans per day.   She has 6 kids, 3-17 years old. 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   Often portioning out " more food than she finds she can eat.     June 2022 - Continues to wean pop intake. Practicing portion control and increasing fruit intake.     August 2022 - Focusing on eating less meat (upsetting her stomach lately). She eliminated pop intake. Notices she is less hungry throughout the day and needing less to feel full. Not eating after 8 pm. She is currently looking for new housing.    Last RD visit 9/6/22 - Continues to avoid pop. Feeling puckett from meals. States she will be starting PT     Today - Starting PT for knee and back. Will be done with Sleep study on 3/23. Needs to set up psych eval, and establish with new therapist and psychiatrist.. Reducing pop and increasing water or using sugar-free beverages. Feeling puckett sooner at meals, after a few bites. Less appetite overall.     Diet Recall:  Breakfast: oranges, Premier Protein   Lunch: egg, ramen noodles   Dinner: 5-6 pm salad   Beverages: Water (64+ oz.day), Gatorade zero     Progress Towards Weight Loss:   1) Continue calorie/sugar-free hydration only. Consume 64+ oz fluid per day. Small, frequent sips all day. Do not drink with meals. Wait 30 mins after meal to drink again - Improving   2) Consume 3 servings of lean protein daily - Improving   - Lean protein sources: chicken/turkey without skin, fish, pork tenderloin, beef sirloin, shellfish, eggs, beans, lentils, tofu, tempeh, low-fat yogurt/cottage cheese, protein bar/shake (see list below for more)  3) Eat slowly (20-30 minutes per meal), chewing foods well (25 chews per bite/applesauce consistency). - Met, continues   4) Check weight on a regular basis. Consider getting a scale for home.    Eating Habits 12/27/2021   Do you have any dietary restrictions? No   Do you currently binge eat (eat a large amount of food in a short time)? Yes - not recently, feeling puckett quickers   Are you an emotional eater? Yes - depressed    Do you get up to eat after falling asleep? Yes   What foods do you crave?  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  Walking a lot more in the summer.      Dining Out History Reviewed With Patient 12/27/2021   How often do you dine out? Around once a week.   Where do you dine out? (select all that apply) sit-down restaurants, fast food chains, take out   What types of food do you order when you dine out? Salad, chicken, burgers, pizza, tacos       Physical Activity Reviewed With Patient 12/27/2021   How often do you exercise? Daily   What is the duration of your exercise (in minutes)? 45 Minutes   What types of exercise do you do? walking, home gym   What keeps you from being more active?  I am as active as I can possbily be         NUTRITION DIAGNOSIS:  Obesity r/t long history of positive energy balance aeb BMI >30 kg/m2. - continues    INTERVENTION:  Intervention Provided/Education Provided:  Reviewed post-op diet guidelines, ways to help prepare for post-op diet guidelines pre-operatively, portion/calorie-control, mindful eating and sources of protein.   Provided nutrition education on dietary mgmt of DM, including healthy food choice and how to create a balanced meal. Reviewed importance of establishing a consistent meal pattern, increasing lean protein intake, and then fruit/veggies portions at meals.  Reviewed surgery task list. Mailed letters for clearances to pt.  Patient demonstrates understanding. Mailed AVS.    Questions Reviewed With Patient 12/27/2021   How ready are you to make changes regarding your weight? Number 1 = Not ready at all to make changes up to 10 = very ready. 10   How confident are you that you can change? 1 = Not confident that you will be successful making changes up to 10 = very confident. 10       Expected Engagement: good     Follow-up: 4/17/23    GOALS:  1) Continue calorie/sugar-free hydration only.  "Consume 64+ oz fluid per day. Small, frequent sips all day. Do not drink with meals. Wait 30 mins after meal to drink again  2) Consume 3 servings of lean protein daily   - Lean protein sources: chicken/turkey without skin, fish, pork tenderloin, beef sirloin, shellfish, eggs, beans, lentils, tofu, tempeh, low-fat yogurt/cottage cheese, protein bar/shake (see list below for more)  3) Eat slowly (20-30 minutes per meal), chewing foods well (25 chews per bite/applesauce consistency).  4) Check weight on a regular basis. Consider getting a scale for home.    Diet Guidelines after Weight Loss Surgery  [email or web address]     Tools for after surgery:  [employer] Dish Sets: bariatric meal size bowls and plates with built in measurement designs on the dishes    Bariatric Pal: https://store.Moda2Ride.HigherNext/collections/bariatric-dinnerware    Books:  \"Fresh Start Bariatric Cookbook\" by Briana Cerna, MS, RDN, CD - Excellent cookbook with post-op recipes and many other resources to check out for ongoing support after surgery    \"Eating Well After Weight Loss Surgery\" by Jojo Mclaughlin and Hank SharmaPreethi - Great cookbook with recipes for each diet stage after surgery and recommended portion sizes. Slightly more intensive cooking recipes.    \"Bariatric Mindset Success\" by Ana M Ramirez - book that helps with prevention of weight regain after surgery. Helps train your brain for long term success with weight loss after surgery.    Post-Bariatric Surgery Online Recipe Resources:  Online:    Simpler Recipes: https://www.Hochy eto/bariatric-surgery/recipes    https://www.Only-apartments.HigherNext/bariatric-recipes/    https://bariatricDigit Wireless.HigherNext/bariatric-recipes/     Some recipes on this site have larger than 1 cup portion size pictures: http://www.muschealth.org/weight-loss-surgery/nutrition/recipe-corner.html     https://www.SearchMe.me/25-bariatric-friendly-weeknight-meals/    Crockpot recipes: " https://www.foodcoach.me/25-bariatric-friendly-crockpot-recipes/    https://Amitive.Xceive/recipes/     https://www.LIFE SPAN labs.Xceive/mbd-recipes    The World According to EggAdBm Technologies Blog: https://theworldaccordingtoeggface.Think Finance.com/            Time spent with patient: 17 minutes.  Kanwal Hunter RD, LD

## 2023-03-14 NOTE — PROGRESS NOTES
" [employer] Study Phone Visit    Study Description: The purpose of this study is to collect sleep data for product research and development. We will compare the performance of the Smartwatch to a medical-grade Home Sleep Test (HST). We hope to learn whether the Smartwatch can be used to track sleep quality at home.      Subject Number:     Study Day: Week 4    [employer] Study Subject was called today to:    Review Compliance     Answer subject questions    Deliver study protocol reminders to subject    Reminders Checklist:   [x]  Connected to WiFi  [x]  Completing both morning and evening surveys  [x]  Watch and Phone on  near each other after completed morning survey   [x]  Take devices off before showering/getting them wet  [x]  Make sure to dismiss any update notifications. -Tap \"Not Now\"  [x]  Good HSAT   [x]  Remind them of next appointment with us       Adverse Events & Con Med Assessment Performed?   [x]     (If yes, please generate adverse event report for PI to gerri)      14-MAR-2023    Jerod Brown    "

## 2023-03-16 ENCOUNTER — VIRTUAL VISIT (OUTPATIENT)
Dept: ENDOCRINOLOGY | Facility: CLINIC | Age: 38
End: 2023-03-16
Payer: COMMERCIAL

## 2023-03-16 DIAGNOSIS — E66.01 CLASS 3 SEVERE OBESITY DUE TO EXCESS CALORIES WITH SERIOUS COMORBIDITY AND BODY MASS INDEX (BMI) OF 40.0 TO 44.9 IN ADULT (H): ICD-10-CM

## 2023-03-16 DIAGNOSIS — E66.813 CLASS 3 SEVERE OBESITY DUE TO EXCESS CALORIES WITH SERIOUS COMORBIDITY AND BODY MASS INDEX (BMI) OF 40.0 TO 44.9 IN ADULT (H): ICD-10-CM

## 2023-03-16 DIAGNOSIS — Z71.3 NUTRITIONAL COUNSELING: Primary | ICD-10-CM

## 2023-03-16 DIAGNOSIS — E66.9 DIABETES MELLITUS TYPE 2 IN OBESE: ICD-10-CM

## 2023-03-16 DIAGNOSIS — E11.69 DIABETES MELLITUS TYPE 2 IN OBESE: ICD-10-CM

## 2023-03-16 PROCEDURE — 97803 MED NUTRITION INDIV SUBSEQ: CPT | Mod: VID | Performed by: DIETITIAN, REGISTERED

## 2023-03-16 PROCEDURE — 99207 PR NO CHARGE LOS: CPT | Mod: VID | Performed by: DIETITIAN, REGISTERED

## 2023-03-16 NOTE — PATIENT INSTRUCTIONS
"Darryn Lindsey,    Follow-up with RD on 4/17    Thank you,    Kanwal Hunter, RD, LD  If you would like to schedule or reschedule an appointment with the RD, please call [phone]    Nutrition Goals  1) Continue calorie/sugar-free hydration only. Consume 64+ oz fluid per day. Small, frequent sips all day. Do not drink with meals. Wait 30 mins after meal to drink again  2) Consume 3 servings of lean protein daily   - Lean protein sources: chicken/turkey without skin, fish, pork tenderloin, beef sirloin, shellfish, eggs, beans, lentils, tofu, tempeh, low-fat yogurt/cottage cheese, protein bar/shake (see list below for more)  3) Eat slowly (20-30 minutes per meal), chewing foods well (25 chews per bite/applesauce consistency).  4) Check weight on a regular basis. Consider getting a scale for home.    Diet Guidelines after Weight Loss Surgery  [email or web address]     Tools for after surgery:  i3 membrane Dish Sets: bariatric meal size bowls and plates with built in measurement designs on the dishes    Bariatric Pal: https://store.bariatricpal.com/collections/bariatric-dinnerware    Books:  \"Fresh Start Bariatric Cookbook\" by Briana Cerna, MS, RDN, CD - Excellent cookbook with post-op recipes and many other resources to check out for ongoing support after surgery    \"Eating Well After Weight Loss Surgery\" by Jojo Mclaughlin and Hank Sharma-Preethi - Great cookbook with recipes for each diet stage after surgery and recommended portion sizes. Slightly more intensive cooking recipes.    \"Bariatric Mindset Success\" by Ana M Ramirez - book that helps with prevention of weight regain after surgery. Helps train your brain for long term success with weight loss after surgery.    Post-Bariatric Surgery Online Recipe Resources:  Online:  Simpler Recipes: https://www.SwingPal.Vana Workforce/bariatric-surgery/recipes  https://www.Continuity Control.Vana Workforce/bariatric-recipes/  https://bariatricmealSpeaktoit.com/bariatric-recipes/   Some recipes on this site " have larger than 1 cup portion size pictures: http://www.[facility].org/weight-loss-surgery/nutrition/recipe-corner.html   https://www.Josey Ellis Commercial Real Estate Investments.me/25-bariatric-friendly-weeknight-meals/  Crockpot recipes: https://www.Josey Ellis [employer] Real Estate Investments.me/25-bariatric-friendly-crockpot-recipes/  https://Solar Notion.SCP Events/recipes/   https://www.Echo Global Logistics/mbd-recipes  The World According to Eggface Blog: https://theworldaccordingtoeggface.zahnarztzentrum.ch.SCP Events/            COMPREHENSIVE WEIGHT MANAGEMENT PROGRAM  VIRTUAL SUPPORT GROUPS    For Support Group Information:      We offer support groups for patients who are working on weight loss and considering, preparing for or have had weight loss surgery.   There is no cost for this opportunity.  You are invited to attend the?Virtual Support Groups?provided by any of the following locations:    [facility] via Microsoft Teams with Katy Garza RN  2.   [place] via Oktalogic with Alhaji Blount, PhD, LP  3.   [place] via Oktalogic with Jesica Banuelos RN  4.   [facility] via Microsoft Teams with Jesica Granado [facility]    The following Support Group information can also be found on our website:  https://www.[facility].org/treatments/weight-loss-surgery-support-groups    https://www.[facility].org/treatments/weight-loss-and-weight-loss-surgery-support-groups    [facility] Weight Loss Surgery Support Group    [facility] Weight Loss Surgery Support Group  The support group is a patient-lead forum that meets monthly to share experiences, encouragement and education. It is open to those who have had weight loss surgery, are scheduled for surgery, or are considering surgery.   WHEN: This group meets on the 3rd Wednesday of each month from 5:00PM - 6:00PM virtually using Microsoft Teams.   FACILITATOR: Led by Katy Root, ABBY, LD, RN, the program's Clinical Coordinator.   TO REGISTER: Please contact the clinic via Med[employer] or call  "the nurse line directly at [phone] to inform our staff that you would like an invite sent to you and to let us know the email you would like the invite sent to. Prior to the meeting, a link with directions on how to join the meeting will be sent to you.    2023 Meetings (speakers to be determined)  January 18: \"Let's Talk\" a time for the group to share.  February 15: \"Let's Talk\" a time for the group to share.  March 15: \"Let's Talk\" a time for the group to share.  April 19: \"Let's Talk\" a time for the group to share.  May 17: \"Let's Talk\" a time for the group to share.  June 21: \"Let's Talk\" a time for the group to share.    [facility] Support Groups    Connections Bariatric Care Support Group?  This is open to all pre- and post- operative bariatric surgery patients as well as their support system.   WHEN: This group meets the 2nd Tuesday of each month from 6:30 PM - 8:00 PM virtually using Microsoft Teams.   FACILITATOR: Led by Alhaji Blount, Ph.D who is a Licensed Psychologist with the [facility] Comprehensive Weight Management Program.   TO REGISTER: Please send an email to Alhaji Blount, Ph.D., LP at?[email or web address]?if you would like an invitation to the group and to learn about using Microsoft Teams.    2023 Meetings  January 10: Magdi Foley, PharmD, Pharmacy Resident, \"Medications and Bariatric Surgery\"  February 14:  March 14:  April 11:  May 9:  June 11:    Connections Post-Operative Bariatric Surgery Support Group  This is a support group for [facility] bariatric patients (and those external to [facility]) who have had bariatric surgery and are at least 3 months post-surgery.  WHEN: This support group meets the 4th Wednesday of the month from 11:00 AM - 12:00 PM virtually using Microsoft Teams.   FACILITATOR: Led by Certified Bariatric Nurse, Jesica Banuelos RN.   TO REGISTER: Please send an email to Jesica at " [email or web address] if you would like an invitation to the group and to learn about using Microsoft Teams.    2023 Meetings  January 25  February 22  March 22  April 26  May 24  [place] 28      [facility] Healthy Lifestyle Virtual Support Group    Healthy Lifestyle Virtual Support Group?  This is 60 minutes of small group guided discussion, support and resources. All are welcome who want a healthy lifestyle.  WHEN: This group meets monthly on a Friday from 12:30 PM - 1:30 PM virtually using Microsoft Teams.   FACILITATOR: Led by National Board Certified Health and , Jesica Granado [facility].   TO REGISTER: Please send an email to Jesica [email or web address] to receive monthly invites to the group or if you have any questions about having a health .  Prior to the meeting, a link with directions on how to join the meeting will be sent to you.    2023 Meetings  January 20: \"Let's Talk\" a time for the group to share  February 17: Guest Speaker, Rianna Nicole RD, Registered Dietician, \"Tips to Maximize Your Metabolism\"  March 17: Let's Talk\" a time for the group to share  April 14: Guest Speaker, Leanne Baptiste RD, Registered Dietician, \"Heart Health\"  May 19: \"Let's Talk\" a time for the group to share  June: To be announced.            "

## 2023-03-16 NOTE — LETTER
"3/16/2023       RE: César Son  [address] [place] [address]     Dear Colleague,    Thank you for referring your patient, César Son, to the [facility] [place] at [facility]. Please see a copy of my visit note below.    César Son is a 37 year old female who is being evaluated via a billable video visit.      The patient has been notified of following:     \"This video visit will be conducted via a call between you and your physician/provider. We have found that certain health care needs can be provided without the need for an in-person physical exam.  This service lets us provide the care you need with a video conversation.  If a prescription is necessary we can send it directly to your pharmacy.  If lab work is needed we can place an order for that and you can then stop by our lab to have the test done at a later time.    Video visits are billed at different rates depending on your insurance coverage.  Please reach out to your insurance provider with any questions.    If during the course of the call the physician/provider feels a video visit is not appropriate, you will not be charged for this service.\"    Patient has given verbal consent for Video visit? Yes  How would you like to obtain your AVS? MyChart  If you are dropped from the video visit, the video invite should be resent to: Text to cell phone: [phone]  Will anyone else be joining your video visit? No  {If patient encounters technical issues they should call [phone]      Video-Visit Details    Type of service:  Video Visit    Video Start Time: 10:34 AM  Video End Time: 10:51 AM    Originating Location (pt. Location): Home    Distant Location (provider location):  Offsite (providers home) [facility] WE[facility]NA[facility]EMENT [facility]     Platform used for Video Visit: LMN-1    During this virtual visit the patient is located " "in [place], patient verifies this as the location during the entirety of this visit.           Bariatric Nutrition Consultation Note    Reason For Visit: Nutrition Assessment    César Son is a 36 year old presenting today for return bariatric nutrition consult.   Pt is interested in laparoscopic sleeve gastrectomy with Dr. Slade.  Patient is accompanied by self.  This is pt's 5th of 6 required nutrition visits prior to surgery.     Pt referred by DILLON Vu on January 18, 2022.  Patient with Co-morbidities of obesity including:  Type II DM yes  Renal Failure no  Sleep apnea yes  Hypertension yes   Dyslipidemia no  Joint pain yes  Back pain yes  GERD no     Support System Reviewed With Patient 12/27/2021   Who do you have in your support network that can be available to help you for the first 2 weeks after surgery? Kid's father, kids   Who can you count on for support throughout your weight loss surgery journey? kid's father, kids       ANTHROPOMETRICS:  Estimated body mass index is 40.77 kg/m  as calculated from the following:    Height as of 12/27/21: 1.676 m (5' 6\").    Weight as of 12/27/21: 114.6 kg (252 lb 9.6 oz).    Current weight: Unknown, will have wt checked on 3/21 at pcp visit at [facility].      Required weight loss goal pre-op: 10 lbs from initial consult weight (goal weight 242 lbs or less before surgery)       12/27/2021   I have tried the following methods to lose weight Watching portions or calories, Exercise, Slimfast, OTC Medications       Weight Loss Questions Reviewed With Patient 12/27/2021   How long have you been overweight? Since late teens through early 20's       SUPPLEMENT INFORMATION:  Vitamin D    NUTRITION HISTORY:  NKFA. Does not tolerate greasy foods, example pt provided - ground beef  Previous experiences with loss: diet gummies (just made her go to the bathroom more), increased exercise.   Usually not having breakfast, and going a while until eating full meal  Lunch " is biggest meal.  She describes having new early satiety at meals in past couple months. Not feeling that hungry will eat whatever.  Working on cutting out pop - 3-4 cans per day, down from 5-6 cans per day.   She has 6 kids, 3-17 years old. 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   Often portioning out more food than she finds she can eat.     June 2022 - Continues to wean pop intake. Practicing portion control and increasing fruit intake.     August 2022 - Focusing on eating less meat (upsetting her stomach lately). She eliminated pop intake. Notices she is less hungry throughout the day and needing less to feel full. Not eating after 8 pm. She is currently looking for new housing.    Last RD visit 9/6/22 - Continues to avoid pop. Feeling puckett from meals. States she will be starting PT     Today - Starting PT for knee and back. Will be done with Sleep study on 3/23. Needs to set up psych eval, and establish with new therapist and psychiatrist.. Reducing pop and increasing water or using sugar-free beverages. Feeling puckett sooner at meals, after a few bites. Less appetite overall.     Diet Recall:  Breakfast: oranges, Premier Protein   Lunch: egg, ramen noodles   Dinner: 5-6 pm salad   Beverages: Water (64+ oz.day), Gatorade zero     Progress Towards Weight Loss:   1) Continue calorie/sugar-free hydration only. Consume 64+ oz fluid per day. Small, frequent sips all day. Do not drink with meals. Wait 30 mins after meal to drink again - Improving   2) Consume 3 servings of lean protein daily - Improving   - Lean protein sources: chicken/turkey without skin, fish, pork tenderloin, beef sirloin, shellfish, eggs, beans, lentils, tofu, tempeh, low-fat yogurt/cottage cheese, protein bar/shake (see list below for more)  3) Eat slowly (20-30 minutes per meal), chewing foods well (25 chews per bite/applesauce consistency). - Met, continues   4) Check weight on a regular basis. Consider getting a scale  for home.    Eating Habits 12/27/2021   Do you have any dietary restrictions? No   Do you currently binge eat (eat a large amount of food in a short time)? Yes - not recently, feeling puckett quickers   Are you an emotional eater? Yes - depressed    Do you get up to eat after falling asleep? Yes   What foods do you crave? 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  Walking a lot more in the summer.      Dining Out History Reviewed With Patient 12/27/2021   How often do you dine out? Around once a week.   Where do you dine out? (select all that apply) sit-down restaurants, fast food chains, take out   What types of food do you order when you dine out? Salad, chicken, burgers, pizza, tacos       Physical Activity Reviewed With Patient 12/27/2021   How often do you exercise? Daily   What is the duration of your exercise (in minutes)? 45 Minutes   What types of exercise do you do? walking, home gym   What keeps you from being more active?  I am as active as I can possbily be         NUTRITION DIAGNOSIS:  Obesity r/t long history of positive energy balance aeb BMI >30 kg/m2. - continues    INTERVENTION:  Intervention Provided/Education Provided:  Reviewed post-op diet guidelines, ways to help prepare for post-op diet guidelines pre-operatively, portion/calorie-control, mindful eating and sources of protein.   Provided nutrition education on dietary mgmt of DM, including healthy food choice and how to create a balanced meal. Reviewed importance of establishing a consistent meal pattern, increasing lean protein intake, and then fruit/veggies portions at meals.  Reviewed surgery task list. Mailed letters for clearances to pt.  Patient demonstrates understanding. Mailed AVS.    Questions Reviewed With Patient 12/27/2021   How ready are you to make changes regarding your weight?  "Number 1 = Not ready at all to make changes up to 10 = very ready. 10   How confident are you that you can change? 1 = Not confident that you will be successful making changes up to 10 = very confident. 10       Expected Engagement: good     Follow-up: 4/17/23    GOALS:  1) Continue calorie/sugar-free hydration only. Consume 64+ oz fluid per day. Small, frequent sips all day. Do not drink with meals. Wait 30 mins after meal to drink again  2) Consume 3 servings of lean protein daily   - Lean protein sources: chicken/turkey without skin, fish, pork tenderloin, beef sirloin, shellfish, eggs, beans, lentils, tofu, tempeh, low-fat yogurt/cottage cheese, protein bar/shake (see list below for more)  3) Eat slowly (20-30 minutes per meal), chewing foods well (25 chews per bite/applesauce consistency).  4) Check weight on a regular basis. Consider getting a scale for home.    Diet Guidelines after Weight Loss Surgery  [email or web address]     Tools for after surgery:  Altrec.com Dish Sets: bariatric meal size bowls and plates with built in measurement designs on the dishes    Bariatric Pal: https://store.bariatricpal.com/collections/bariatric-dinnerware    Books:  \"Fresh Start Bariatric Cookbook\" by Brinaa Cerna, MS, RDN, CD - Excellent cookbook with post-op recipes and many other resources to check out for ongoing support after surgery    \"Eating Well After Weight Loss Surgery\" by Jojo Mclaughlin and Hank Canales - Great cookbook with recipes for each diet stage after surgery and recommended portion sizes. Slightly more intensive cooking recipes.    \"Bariatric Mindset Success\" by Ana M Ramirez - book that helps with prevention of weight regain after surgery. Helps train your brain for long term success with weight loss after surgery.    Post-Bariatric Surgery Online Recipe Resources:  Online:    Simpler Recipes: " https://www.UltraV TechnologiesedChirpme.com/bariatric-surgery/recipes    https://www.Shaker.com/bariatric-recipes/    https://bariatricmealDouble Doods.com/bariatric-recipes/     Some recipes on this site have larger than 1 cup portion size pictures: http://www.[facility].org/weight-loss-surgery/nutrition/recipe-corner.html     https://www.SiNode Systems.me/25-bariatric-friendly-weeknight-meals/    Crockpot recipes: https://www.SiNode Systems.me/25-bariatric-friendly-crockpot-recipes/    https://Zingfin.Brevado/recipes/     https://www.Thrasos.Brevado/mbd-recipes    The World According to JoKno Blog: https://theworldaccordingtoeggface.Dlyte.com.com/            Time spent with patient: 17 minutes.  Kanwal Hunter RD, LD

## 2023-03-21 ENCOUNTER — VIRTUAL VISIT (OUTPATIENT)
Dept: RESEARCH | Facility: CLINIC | Age: 38
End: 2023-03-21
Payer: COMMERCIAL

## 2023-03-21 DIAGNOSIS — Z00.6 EXAMINATION OF PARTICIPANT OR CONTROL IN CLINICAL RESEARCH: Primary | ICD-10-CM

## 2023-03-21 PROCEDURE — 99207 PR NO CHARGE NURSE ONLY: CPT | Mod: 93

## 2023-03-21 NOTE — PROGRESS NOTES
" [place] Study Phone Visit    Study Description: The purpose of this study is to collect sleep data for product research and development. We will compare the performance of the Smartwatch to a medical-grade Home Sleep Test (HST). We hope to learn whether the Smartwatch can be used to track sleep quality at home.      Subject Number:     Study Day: Week 5    [place] Study Subject was called today to:    Review Compliance     Answer subject questions    Deliver study protocol reminders to subject    Reminders Checklist:   [x]  Connected to WiFi  [x]  Completing both morning and evening surveys  [x]  Watch and Phone on  near each other after completed morning survey   [x]  Take devices off before showering/getting them wet  [x]  Make sure to dismiss any update notifications. -Tap \"Not Now\"  [x]  Remind them of next appointment with us     Adverse Events & Con Med Assessment Performed?   [x]    (If yes, please generate adverse event report for PI to cosign)      21-MAR-2023    Julian Gutierrez    "

## 2023-03-28 ENCOUNTER — ALLIED HEALTH/NURSE VISIT (OUTPATIENT)
Dept: RESEARCH | Facility: CLINIC | Age: 38
End: 2023-03-28
Payer: COMMERCIAL

## 2023-03-28 DIAGNOSIS — Z00.6 EXAMINATION OF PARTICIPANT OR CONTROL IN CLINICAL RESEARCH: Primary | ICD-10-CM

## 2023-03-28 PROCEDURE — 99207 PR NO CHARGE NURSE ONLY: CPT

## 2023-03-28 NOTE — PROGRESS NOTES
[facility] Study End Note    Study Description: The purpose of this study is to collect sleep data for product research and development. We will compare the performance of the Smartwatch to a medical-grade Home Sleep Test (HST). We hope to learn whether the Smartwatch can be used to track sleep quality at home.        Study Termination/Completion Date: 28-MAR-2023  Reason for Termination (If Applicable): Completed     Subject returned all study devices today and this completes this study for the subject.    Adverse Events & Con Med Assessment Performed?   [x]       28-MAR-2023    Chris Campbell

## 2023-04-14 NOTE — PROGRESS NOTES
"César Son is a 37 year old female who is being evaluated via a billable video visit.      The patient has been notified of following:     \"This video visit will be conducted via a call between you and your physician/provider. We have found that certain health care needs can be provided without the need for an in-person physical exam.  This service lets us provide the care you need with a video conversation.  If a prescription is necessary we can send it directly to your pharmacy.  If lab work is needed we can place an order for that and you can then stop by our lab to have the test done at a later time.    Video visits are billed at different rates depending on your insurance coverage.  Please reach out to your insurance provider with any questions.    If during the course of the call the physician/provider feels a video visit is not appropriate, you will not be charged for this service.\"    Patient has given verbal consent for Video visit? Yes  How would you like to obtain your AVS? MyChart  If you are dropped from the video visit, the video invite should be resent to: Text to cell phone: [phone]  Will anyone else be joining your video visit? No  {If patient encounters technical issues they should call [phone]      Video-Visit Details    Type of service:  Video Visit    Video Start Time: 12:22 PM   Video End Time: 12:35 PM    Originating Location (pt. Location): Home    Distant Location (provider location):  Offsite (providers home) [facility]NAG[facility] [place]     Platform used for Video Visit: Trademob    During this virtual visit the patient is located in [place], patient verifies this as the location during the entirety of this visit.       Bariatric Nutrition Consultation Note    Reason For Visit: Nutrition Assessment    César Son is a 36 year old presenting today for return bariatric nutrition consult.   Pt is interested in laparoscopic sleeve gastrectomy with Dr." "Yany.  Patient is accompanied by self.  This is pt's 6th of 6 required nutrition visits prior to surgery.     Coordination note: Needs to complete Sleep Study. She reports attempting to do at home, but did not work so plans to request to do at sleep clinic. She plans to call to schedule today.  Needs to establish with therapy and psychiatrist   She has tried contacting community psychologist to set up bariatric eval, but has not heard back. Messaged Marcus today to help pt get set up with Dr. Catherine for psych eval.   She has appt with PCP today and plans to request letter of support.     Pt referred by DILLON Vu on January 18, 2022.  Patient with Co-morbidities of obesity including:  Type II DM yes  Renal Failure no  Sleep apnea yes  Hypertension yes   Dyslipidemia no  Joint pain yes  Back pain yes  GERD no         4/5/2023     9:58 AM   Support System Reviewed With Patient   Who do you have in your support network that can be available to help you for the first 2 weeks after surgery? Myself and myself and my kids   Who can you count on for support throughout your weight loss surgery journey? Myself and my kids       ANTHROPOMETRICS:  Estimated body mass index is 40.77 kg/m  as calculated from the following:    Height as of 12/27/21: 1.676 m (5' 6\").    Weight as of 12/27/21: 114.6 kg (252 lb 9.6 oz).    Current weight: Unknown, has a in clinic pcp visit today    Required weight loss goal pre-op: 10 lbs from initial consult weight (goal weight 242 lbs or less before surgery)        4/5/2023     9:58 AM   --   I have tried the following methods to lose weight Watching portions or calories    Exercise    Atkins type diet (low carb/high protein)    Slimfast           4/5/2023     9:58 AM   Weight Loss Questions Reviewed With Patient   How long have you been overweight? Following one or more pregnancies       SUPPLEMENT INFORMATION:  Vitamin D    NUTRITION HISTORY:  NKFA. Does not tolerate greasy foods, example " pt provided - ground beef  Previous experiences with loss: diet gummies (just made her go to the bathroom more), increased exercise.   She has 6 kids, 3-17 years old. 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   Often portioning out more food than she finds she can eat.     Last RD visit 3/16/23 - Starting PT for knee and back.. Needs to set up psych eval, and establish with new therapist and psychiatrist.. Reducing pop and increasing water or using sugar-free beverages. Feeling puckett sooner at meals, after a few bites. Less appetite overall.     Today - Continues to do PT, but describes worsening back and leg pain. Making it difficult to work, get quality sleep and participate in PT consistently. Recently she was admitted for kidney infection and pancreatitis. Has poor appetite r/t pain. Notes BP has been really high lately. Has visit scheduled with PCP today.      Diet Recall:  Breakfast: oranges, Premier Protein   Lunch: egg, ramen noodles   Dinner: 5-6 pm salad   Beverages: Water (64+ oz.day), Gatorade zero     Progress Towards Weight Loss:   1) Continue calorie/sugar-free hydration only. Consume 64+ oz fluid per day. Small, frequent sips all day. Do not drink with meals. Wait 30 mins after meal to drink again - Ongoing  2) Consume 3 servings of lean protein daily - Not met, states she has not been eating solids d/t pain   - Lean protein sources: chicken/turkey without skin, fish, pork tenderloin, beef sirloin, shellfish, eggs, beans, lentils, tofu, tempeh, low-fat yogurt/cottage cheese, protein bar/shake (see list below for more)  3) Eat slowly (20-30 minutes per meal), chewing foods well (25 chews per bite/applesauce consistency). - Did not discuss today  4) Check weight on a regular basis. Consider getting a scale for home. - Not met    Eating Habits 12/27/2021   Do you have any dietary restrictions? No   Do you currently binge eat (eat a large amount of food in a short time)? Yes - not  recently, feeling puckett quickers   Are you an emotional eater? Yes - depressed    Do you get up to eat after falling asleep? Yes   What foods do you crave? 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  Walking a lot more in the summer.          4/5/2023     9:58 AM   Dining Out History Reviewed With Patient   How often do you dine out? Rarely.   Where do you dine out? (select all that apply) sit-down restaurants   What types of food do you order when you dine out? Depends we're I go           4/5/2023     9:58 AM   Physical Activity Reviewed With Patient   How often do you exercise? 1 to 2 times per week   What is the duration of your exercise (in minutes)? 45 Minutes   What types of exercise do you do? walking   What keeps you from being more active? I am as active as I can possbily be    Pain         NUTRITION DIAGNOSIS:  Obesity r/t long history of positive energy balance aeb BMI >30 kg/m2. - continues    INTERVENTION:  Intervention Provided/Education Provided:  Reviewed post-op diet guidelines, ways to help prepare for post-op diet guidelines pre-operatively, portion/calorie-control, mindful eating and sources of protein.   Discussed using meal replacement shakes since she is not tolerating solid food as well right now.   Reviewed surgery task list.   Patient demonstrates understanding.   Provided pt with list of goals via PsychSignal.         4/5/2023     9:58 AM   Questions Reviewed With Patient   How ready are you to make changes regarding your weight? Number 1 = Not ready at all to make changes up to 10 = very ready. 10   How confident are you that you can change? 1 = Not confident that you will be successful making changes up to 10 = very confident. 1       Expected Engagement: good     Follow-up: 5/16/23    GOALS:  1) Replace 2 meals daily with protein shake. At  "third meal focus on palm-sized pc of meat/fish/egg/protien and fruit/vegetable.   2) Calorie-free hydration only. Drink 64+ oz/day  3) Have weight checked at upcoming PCP visit    Diet Guidelines after Weight Loss Surgery  [email or web address]     Tools for after surgery:  Games2Win Dish Sets: bariatric meal size bowls and plates with built in measurement designs on the dishes    Bariatric Pal: https://store.AMIA Systems.Rsync.net/collections/bariatric-dinnerware    Books:  \"Fresh Start Bariatric Cookbook\" by Briana Cerna, MS, RDN, CD - Excellent cookbook with post-op recipes and many other resources to check out for ongoing support after surgery    \"Eating Well After Weight Loss Surgery\" by Jojo Mclaughlin and Hank Sharma-Preethi - Great cookbook with recipes for each diet stage after surgery and recommended portion sizes. Slightly more intensive cooking recipes.    \"Bariatric Mindset Success\" by Ana M Ramirez - book that helps with prevention of weight regain after surgery. Helps train your brain for long term success with weight loss after surgery.    Post-Bariatric Surgery Online Recipe Resources:  Online:    Simpler Recipes: https://www.Bebo/bariatric-surgery/recipes    https://www.10seconds Software/bariatric-recipes/    https://BlockSpring/bariatric-recipes/     Some recipes on this site have larger than 1 cup portion size pictures: http://www.muschealth.org/weight-loss-surgery/nutrition/recipe-corner.html     https://www.foodcoach.me/25-bariatric-friendly-weeknight-meals/    Crockpot recipes: https://www.foodcoArts Alliance Media.me/25-bariatric-friendly-crockpot-recipes/    https://Tvinci.Rsync.net/recipes/     https://www.Three Melons.Rsync.net/mbd-recipes    The World According to Eggface Blog: https://theworldaccordingtorandigface.cafegive.com/            Time spent with patient: 13 minutes.  Kanwal Hunter RD, LD  "

## 2023-04-17 ENCOUNTER — VIRTUAL VISIT (OUTPATIENT)
Dept: ENDOCRINOLOGY | Facility: CLINIC | Age: 38
End: 2023-04-17
Payer: COMMERCIAL

## 2023-04-17 DIAGNOSIS — E66.9 DIABETES MELLITUS TYPE 2 IN OBESE: ICD-10-CM

## 2023-04-17 DIAGNOSIS — E11.69 DIABETES MELLITUS TYPE 2 IN OBESE: ICD-10-CM

## 2023-04-17 DIAGNOSIS — Z71.3 NUTRITIONAL COUNSELING: Primary | ICD-10-CM

## 2023-04-17 DIAGNOSIS — E66.01 CLASS 3 SEVERE OBESITY DUE TO EXCESS CALORIES WITH SERIOUS COMORBIDITY AND BODY MASS INDEX (BMI) OF 40.0 TO 44.9 IN ADULT (H): ICD-10-CM

## 2023-04-17 DIAGNOSIS — E66.813 CLASS 3 SEVERE OBESITY DUE TO EXCESS CALORIES WITH SERIOUS COMORBIDITY AND BODY MASS INDEX (BMI) OF 40.0 TO 44.9 IN ADULT (H): ICD-10-CM

## 2023-04-17 PROCEDURE — 99207 PR NO CHARGE LOS: CPT | Mod: VID | Performed by: DIETITIAN, REGISTERED

## 2023-04-17 PROCEDURE — 97803 MED NUTRITION INDIV SUBSEQ: CPT | Mod: VID | Performed by: DIETITIAN, REGISTERED

## 2023-04-17 NOTE — LETTER
"4/17/2023       RE: César Son  [address] [place] [address]     Dear Colleague,    Thank you for referring your patient, César Son, to the [facility] [place] [facility]. Please see a copy of my visit note below.    César Son is a 37 year old female who is being evaluated via a billable video visit.      The patient has been notified of following:     \"This video visit will be conducted via a call between you and your physician/provider. We have found that certain health care needs can be provided without the need for an in-person physical exam.  This service lets us provide the care you need with a video conversation.  If a prescription is necessary we can send it directly to your pharmacy.  If lab work is needed we can place an order for that and you can then stop by our lab to have the test done at a later time.    Video visits are billed at different rates depending on your insurance coverage.  Please reach out to your insurance provider with any questions.    If during the course of the call the physician/provider feels a video visit is not appropriate, you will not be charged for this service.\"    Patient has given verbal consent for Video visit? Yes  How would you like to obtain your AVS? MyChart  If you are dropped from the video visit, the video invite should be resent to: Text to cell phone: [phone]  Will anyone else be joining your video visit? No  {If patient encounters technical issues they should call [phone]      Video-Visit Details    Type of service:  Video Visit    Video Start Time: 12:22 PM   Video End Time: 12:35 PM    Originating Location (pt. Location): Home    Distant Location (provider location):  Offsite (providers home) [facility]     Platform used for Video Visit: haystagg    During this virtual visit the patient is located " "in [place], patient verifies this as the location during the entirety of this visit.       Bariatric Nutrition Consultation Note    Reason For Visit: Nutrition Assessment    César Son is a 36 year old presenting today for return bariatric nutrition consult.   Pt is interested in laparoscopic sleeve gastrectomy with Dr. Slade.  Patient is accompanied by self.  This is pt's 6th of 6 required nutrition visits prior to surgery.     Coordination note: Needs to complete Sleep Study. She reports attempting to do at home, but did not work so plans to request to do at sleep clinic. She plans to call to schedule today.  Needs to establish with therapy and psychiatrist   She has tried contacting community psychologist to set up bariatric eval, but has not heard back. Messaged Marcus today to help pt get set up with Dr. Catherine for psych eval.   She has appt with PCP today and plans to request letter of support.     Pt referred by DILLON Vu on January 18, 2022.  Patient with Co-morbidities of obesity including:  Type II DM yes  Renal Failure no  Sleep apnea yes  Hypertension yes   Dyslipidemia no  Joint pain yes  Back pain yes  GERD no         4/5/2023     9:58 AM   Support System Reviewed With Patient   Who do you have in your support network that can be available to help you for the first 2 weeks after surgery? Myself and myself and my kids   Who can you count on for support throughout your weight loss surgery journey? Myself and my kids       ANTHROPOMETRICS:  Estimated body mass index is 40.77 kg/m  as calculated from the following:    Height as of 12/27/21: 1.676 m (5' 6\").    Weight as of 12/27/21: 114.6 kg (252 lb 9.6 oz).    Current weight: Unknown, has a in clinic pcp visit today    Required weight loss goal pre-op: 10 lbs from initial consult weight (goal weight 242 lbs or less before surgery)        4/5/2023     9:58 AM   --   I have tried the following methods to lose weight Watching portions or calories    " Exercise    Atkins type diet (low carb/high protein)    Slimfast           4/5/2023     9:58 AM   Weight Loss Questions Reviewed With Patient   How long have you been overweight? Following one or more pregnancies       SUPPLEMENT INFORMATION:  Vitamin D    NUTRITION HISTORY:  NKFA. Does not tolerate greasy foods, example pt provided - ground beef  Previous experiences with loss: diet gummies (just made her go to the bathroom more), increased exercise.   She has 6 kids, 3-17 years old. 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   Often portioning out more food than she finds she can eat.     Last RD visit 3/16/23 - Starting PT for knee and back.. Needs to set up psych eval, and establish with new therapist and psychiatrist.. Reducing pop and increasing water or using sugar-free beverages. Feeling puckett sooner at meals, after a few bites. Less appetite overall.     Today - Continues to do PT, but describes worsening back and leg pain. Making it difficult to work, get quality sleep and participate in PT consistently. Recently she was admitted for kidney infection and pancreatitis. Has poor appetite r/t pain. Notes BP has been really high lately. Has visit scheduled with PCP today.      Diet Recall:  Breakfast: oranges, Premier Protein   Lunch: egg, ramen noodles   Dinner: 5-6 pm salad   Beverages: Water (64+ oz.day), Gatorade zero     Progress Towards Weight Loss:   1) Continue calorie/sugar-free hydration only. Consume 64+ oz fluid per day. Small, frequent sips all day. Do not drink with meals. Wait 30 mins after meal to drink again - Ongoing  2) Consume 3 servings of lean protein daily - Not met, states she has not been eating solids d/t pain   - Lean protein sources: chicken/turkey without skin, fish, pork tenderloin, beef sirloin, shellfish, eggs, beans, lentils, tofu, tempeh, low-fat yogurt/cottage cheese, protein bar/shake (see list below for more)  3) Eat slowly (20-30 minutes per meal),  chewing foods well (25 chews per bite/applesauce consistency). - Did not discuss today  4) Check weight on a regular basis. Consider getting a scale for home. - Not met    Eating Habits 12/27/2021   Do you have any dietary restrictions? No   Do you currently binge eat (eat a large amount of food in a short time)? Yes - not recently, feeling puckett quickers   Are you an emotional eater? Yes - depressed    Do you get up to eat after falling asleep? Yes   What foods do you crave? 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  Walking a lot more in the summer.          4/5/2023     9:58 AM   Dining Out History Reviewed With Patient   How often do you dine out? Rarely.   Where do you dine out? (select all that apply) sit-down restaurants   What types of food do you order when you dine out? Depends we're I go           4/5/2023     9:58 AM   Physical Activity Reviewed With Patient   How often do you exercise? 1 to 2 times per week   What is the duration of your exercise (in minutes)? 45 Minutes   What types of exercise do you do? walking   What keeps you from being more active? I am as active as I can possbily be    Pain         NUTRITION DIAGNOSIS:  Obesity r/t long history of positive energy balance aeb BMI >30 kg/m2. - continues    INTERVENTION:  Intervention Provided/Education Provided:  Reviewed post-op diet guidelines, ways to help prepare for post-op diet guidelines pre-operatively, portion/calorie-control, mindful eating and sources of protein.   Discussed using meal replacement shakes since she is not tolerating solid food as well right now.   Reviewed surgery task list.   Patient demonstrates understanding.   Provided pt with list of goals via [employer].         4/5/2023     9:58 AM   Questions Reviewed With Patient   How ready are you to make changes regarding your  "weight? Number 1 = Not ready at all to make changes up to 10 = very ready. 10   How confident are you that you can change? 1 = Not confident that you will be successful making changes up to 10 = very confident. 1       Expected Engagement: good     Follow-up: 5/16/23    GOALS:  1) Replace 2 meals daily with protein shake. At third meal focus on palm-sized pc of meat/fish/egg/protien and fruit/vegetable.   2) Calorie-free hydration only. Drink 64+ oz/day  3) Have weight checked at upcoming PCP visit    Diet Guidelines after Weight Loss Surgery  [email or web address]     Tools for after surgery:  BuyNow WorldWide Dish Sets: bariatric meal size bowls and plates with built in measurement designs on the dishes    Bariatric Pal: https://store.Lexara.BPL Global/collections/bariatric-dinnerware    Books:  \"Fresh Start Bariatric Cookbook\" by Briana Cerna, MS, RDN, CD - Excellent cookbook with post-op recipes and many other resources to check out for ongoing support after surgery    \"Eating Well After Weight Loss Surgery\" by Jojo Mclaughlin and Hank Canales - Great cookbook with recipes for each diet stage after surgery and recommended portion sizes. Slightly more intensive cooking recipes.    \"Bariatric Mindset Success\" by Ana M Ramirez - book that helps with prevention of weight regain after surgery. Helps train your brain for long term success with weight loss after surgery.    Post-Bariatric Surgery Online Recipe Resources:  Online:  Simpler Recipes: https://www.Endomedix/bariatric-surgery/recipes  https://www.Vidmaker.[employer]/bariatric-recipes/  https://bariatricEngagor.BPL Global/bariatric-recipes/   Some recipes on this site have larger than 1 cup portion size pictures: http://www.muschealth.org/weight-loss-surgery/nutrition/recipe-corner.html   https://www.foodcoach.me/25-bariatric-friendly-weeknight-meals/  Crockpot recipes: " https://www.foodcoach.me/25-bariatric-friendly-crockpot-recipes/  https://LilyMedia.Gemini Mobile Technologies/recipes/   https://www.Torch Group.Gemini Mobile Technologies/mbd-recipes  The World According to EggAOT [employer] Blog: https://theworldaccordingtoeggface.Obvious Engineering.com/            Time spent with patient: 13 minutes.  Kanwal Hunter RD, LD

## 2023-04-17 NOTE — PATIENT INSTRUCTIONS
"Darryn Lindsey,    Follow-up with RD on 5/16    Thank you,    Kanwal Hunter, RD, LD  If you would like to schedule or reschedule an appointment with the RD, please call [phone]    Nutrition Goals  1) Replace 2 meals daily with protein shake. At third meal focus on palm-sized pc of meat/fish/egg/protien and fruit/vegetable.   2) Calorie-free hydration only. Drink 64+ oz/day  3) Have weight checked at upcoming PCP visit    Diet Guidelines after Weight Loss Surgery  [email or web address]     Tools for after surgery:  Nanoogo Dish Sets: bariatric meal size bowls and plates with built in measurement designs on the dishes    Bariatric Pal: https://store.SENSIMED.AJ Tech/collections/bariatric-dinnerware    Books:  \"Fresh Start Bariatric Cookbook\" by Briana Crena MS, RDN, CD - Excellent cookbook with post-op recipes and many other resources to check out for ongoing support after surgery    \"Eating Well After Weight Loss Surgery\" by Jojo Mclaughlin and Hank Canales - Great cookbook with recipes for each diet stage after surgery and recommended portion sizes. Slightly more intensive cooking recipes.    \"Bariatric Mindset Success\" by Ana M Ramirez - book that helps with prevention of weight regain after surgery. Helps train your brain for long term success with weight loss after surgery.    Post-Bariatric Surgery Online Recipe Resources:  Online:  Simpler Recipes: https://www.Liquid Engines/bariatric-surgery/recipes  https://www.aihuishou.AJ Tech/bariatric-recipes/  https://Stocard.AJ Tech/bariatric-recipes/   Some recipes on this site have larger than 1 cup portion size pictures: http://www.muschealth.org/weight-loss-surgery/nutrition/recipe-corner.html   https://www.Tagoodies.me/25-bariatric-friendly-weeknight-meals/  Crockpot recipes: https://www.Tagoodies.me/25-bariatric-friendly-crockpot-recipes/  https://Sunnyloft.AJ Tech/recipes/   https://www.mybariatricdietitian.com/mbd-recipes  The World " "According to [employer] Blog: https://thecarlitoorldaccolisaingjacace.bloThree Squirrels E-commerce.com/                  COMPREHENSIVE WEIGHT MANAGEMENT PROGRAM  VIRTUAL SUPPORT GROUPS    For Support Group Information:      We offer support groups for patients who are working on weight loss and considering, preparing for or have had weight loss surgery.   There is no cost for this opportunity.  You are invited to attend the?Virtual Support Groups?provided by any of the following locations:    [facility] via Microsoft Teams with Katy Garza RN  2.   [place] via Jymob with Alhaji Blount, PhD, LP  3.   [place] via Jymob with Jesica Banuelos RN  4.   [facility] via [employer] Teams with Jesica Granado [facility]    The following Support Group information can also be found on our website:  https://www.[facility].org/treatments/weight-loss-surgery-support-groups    https://www.[facility].org/treatments/weight-loss-and-weight-loss-surgery-support-groups    [facility] Weight Loss Surgery Support Group    [facility] Weight Loss Surgery Support Group  The support group is a patient-lead forum that meets monthly to share experiences, encouragement and education. It is open to those who have had weight loss surgery, are scheduled for surgery, or are considering surgery.   WHEN: This group meets on the 3rd Wednesday of each month from 5:00PM - 6:00PM virtually using Microsoft Teams.   FACILITATOR: Led by Katy Root RD, LD, RN, the program's Clinical Coordinator.   TO REGISTER: Please contact the clinic via Blippar or call the nurse line directly at [phone] to inform our staff that you would like an invite sent to you and to let us know the email you would like the invite sent to. Prior to the meeting, a link with directions on how to join the meeting will be sent to you.    2023 Meetings (speakers to be determined)  January 18: \"Let's Talk\" a time for the group to share.  February " "15: \"Let's Talk\" a time for the group to share.  March 15: \"Let's Talk\" a time for the group to share.  April 19: \"Let's Talk\" a time for the group to share.  May 17: \"Let's Talk\" a time for the group to share.  June 21: \"Let's Talk\" a time for the group to share.    [facility] Support Groups    Connections Bariatric Care Support Group?  This is open to all pre- and post- operative bariatric surgery patients as well as their support system.   WHEN: This group meets the 2nd Tuesday of each month from 6:30 PM - 8:00 PM virtually using Microsoft Teams.   FACILITATOR: Led by Alhaji Blount, Ph.D who is a Licensed Psychologist with the [facility] Comprehensive Weight Management Program.   TO REGISTER: Please send an email to Alhaji Blount, Ph.D., LP at?[email or web address]?if you would like an invitation to the group and to learn about using Microsoft Teams.    2023 Meetings  January 10: Magdi Foley, PharmD, Pharmacy Resident, \"Medications and Bariatric Surgery\"  February 14:  March 14:  April 11:  May 9:  June 11:    Connections Post-Operative Bariatric Surgery Support Group  This is a support group for [facility] bariatric patients (and those external to [facility]) who have had bariatric surgery and are at least 3 months post-surgery.  WHEN: This support group meets the 4th Wednesday of the month from 11:00 AM - 12:00 PM virtually using Microsoft Teams.   FACILITATOR: Led by Certified Bariatric Nurse, Jesica Banuelos RN.   TO REGISTER: Please send an email to Jesica at [email or web address] if you would like an invitation to the group and to learn about using Microsoft Teams.    2023 Meetings  January 25  February 22  March 22  April 26  May 24  Kimberley 28      [facility] Healthy Lifestyle Virtual Support Group    Healthy Lifestyle Virtual Support Group?  This is 60 minutes of small group guided " "discussion, support and resources. All are welcome who want a healthy lifestyle.  WHEN: This group meets monthly on a Friday from 12:30 PM - 1:30 PM virtually using Microsoft Teams.   FACILITATOR: Led by National Board Certified Health and , Jesica Granado [facility].   TO REGISTER: Please send an email to Jesica [email or web address] to receive monthly invites to the group or if you have any questions about having a health .  Prior to the meeting, a link with directions on how to join the meeting will be sent to you.    2023 Meetings  January 20: \"Let's Talk\" a time for the group to share  February 17: Guest Speaker, Rianna Nicole RD, Registered Dietician, \"Tips to Maximize Your Metabolism\"  March 17: Let's Talk\" a time for the group to share  April 14: Guest Speaker, Leanne Baptiste RD, Registered Dietician, \"Heart Health\"  May 19: \"Let's Talk\" a time for the group to share  June: To be announced.            "

## 2023-04-18 ENCOUNTER — TELEPHONE (OUTPATIENT)
Dept: NEUROPSYCHOLOGY | Facility: CLINIC | Age: 38
End: 2023-04-18
Payer: COMMERCIAL

## 2023-04-18 NOTE — TELEPHONE ENCOUNTER
Called patient to discuss scheduling a pre-bariatric surgery psychological evaluation with Dr. Catherine. First available is August 1 at 9 a.m. for a video visit. Patient felt that was scheduling out too far. I did give her the names and phone numbers of Monserrat Barger, Suzan Chery and Carlo Montesinos. Asked her to call me back if she could get in with one of them sooner.

## 2023-04-20 ENCOUNTER — TELEPHONE (OUTPATIENT)
Dept: SLEEP MEDICINE | Facility: CLINIC | Age: 38
End: 2023-04-20
Payer: COMMERCIAL

## 2023-04-20 NOTE — TELEPHONE ENCOUNTER
Order/Referral Request    Who is requesting: PT. Trying to schedule the sleep study but the order expires on 4.24.23 & appts available are after this date    Orders being requested: [facility] Study in [place]    Reason service is needed/diagnosis: orders will be expiring    When are orders needed by: soon    Has this been discussed with Provider: No    Does patient have a preference on a Group/Provider/Facility? [facility] [place]    Does patient have an appointment scheduled?: No    Where to send orders: Place on PT's chart    Could we send this information to you in [employer] or would you prefer to receive a phone call?:   Patient would prefer a phone call   Okay to leave a detailed message?: Yes at Cell number on file:    Telephone Information:   Mobile [phone]

## 2023-04-20 NOTE — TELEPHONE ENCOUNTER
Order has already been extended. Please advise    Shira HAIDER RN  [facility]

## 2023-05-16 ENCOUNTER — VIRTUAL VISIT (OUTPATIENT)
Dept: ENDOCRINOLOGY | Facility: CLINIC | Age: 38
End: 2023-05-16
Payer: COMMERCIAL

## 2023-05-16 DIAGNOSIS — E11.69 DIABETES MELLITUS TYPE 2 IN OBESE: ICD-10-CM

## 2023-05-16 DIAGNOSIS — Z71.3 NUTRITIONAL COUNSELING: Primary | ICD-10-CM

## 2023-05-16 DIAGNOSIS — E66.813 CLASS 3 SEVERE OBESITY DUE TO EXCESS CALORIES WITH SERIOUS COMORBIDITY AND BODY MASS INDEX (BMI) OF 40.0 TO 44.9 IN ADULT (H): ICD-10-CM

## 2023-05-16 DIAGNOSIS — E66.01 CLASS 3 SEVERE OBESITY DUE TO EXCESS CALORIES WITH SERIOUS COMORBIDITY AND BODY MASS INDEX (BMI) OF 40.0 TO 44.9 IN ADULT (H): ICD-10-CM

## 2023-05-16 DIAGNOSIS — E66.9 DIABETES MELLITUS TYPE 2 IN OBESE: ICD-10-CM

## 2023-05-16 PROCEDURE — 97803 MED NUTRITION INDIV SUBSEQ: CPT | Mod: 95 | Performed by: DIETITIAN, REGISTERED

## 2023-05-16 PROCEDURE — 99207 PR NO CHARGE LOS: CPT | Mod: 95 | Performed by: DIETITIAN, REGISTERED

## 2023-05-16 NOTE — LETTER
"5/16/2023       RE: César Son  [address] [place] [address]     Dear Colleague,    Thank you for referring your patient, César Son, to the [facility] WEIGHT MANAGEMENT CLINIC [place] [facility]. Please see a copy of my visit note below.    César Son is a 37 year old female who is being evaluated via a billable video visit.      The patient has been notified of following:     \"This video visit will be conducted via a call between you and your physician/provider. We have found that certain health care needs can be provided without the need for an in-person physical exam.  This service lets us provide the care you need with a video conversation.  If a prescription is necessary we can send it directly to your pharmacy.  If lab work is needed we can place an order for that and you can then stop by our lab to have the test done at a later time.    Video visits are billed at different rates depending on your insurance coverage.  Please reach out to your insurance provider with any questions.    If during the course of the call the physician/provider feels a video visit is not appropriate, you will not be charged for this service.\"    Patient has given verbal consent for Video visit? Yes  How would you like to obtain your AVS? MyChart  If you are dropped from the video visit, the video invite should be resent to: Text to cell phone: [phone]  Will anyone else be joining your video visit? No  {If patient encounters technical issues they should call [phone]      Video-Visit Details    Type of service:  Video Visit    Video Start Time: 1:32 PM   Video End Time: 1:47 PM    Originating Location (pt. Location): Home    Distant Location (provider location):  Offsite (providers home) [facility] WEIGHT MANAGEMENT [facility]     Platform used for Video Visit: [employer] Vino    During this virtual visit the patient is located " "in [place], patient verifies this as the location during the entirety of this visit.       Bariatric Nutrition Consultation Note    Reason For Visit: Nutrition Assessment    César Son is a 36 year old presenting today for return bariatric nutrition consult.   Pt is interested in laparoscopic sleeve gastrectomy with Dr. Slade.  Patient is accompanied by self.  This is pt's 6th of 6 required nutrition visits prior to surgery.     Coordination note: Needs to complete Sleep Study. She reports attempting to do at home, but did not work so plans to request to do at sleep clinic. Sleep visit scheduled 5/18/23  Needs to establish with therapy and psychiatrist   She has appt with PCP today and plans to request letter of support.   Tried Suzan Osman but insurance did not cover meeting with her. Plans to call Alhaji Farris to schedule next.     Pt referred by DILLON Vu on January 18, 2022.  Patient with Co-morbidities of obesity including:  Type II DM yes  Renal Failure no  Sleep apnea yes  Hypertension yes   Dyslipidemia no  Joint pain yes  Back pain yes  GERD no         4/5/2023     9:58 AM   Support System Reviewed With Patient   Who do you have in your support network that can be available to help you for the first 2 weeks after surgery? Myself and myself and my kids   Who can you count on for support throughout your weight loss surgery journey? Myself and my kids       ANTHROPOMETRICS:  Estimated body mass index is 40.77 kg/m  as calculated from the following:    Height as of 12/27/21: 1.676 m (5' 6\").    Weight as of 12/27/21: 114.6 kg (252 lb 9.6 oz).    Current weight (pt reported): 270 lbs (+18 lbs from initial)      Required weight loss goal pre-op: 10 lbs from initial consult weight (goal weight 242 lbs or less before surgery)        4/5/2023     9:58 AM   --   I have tried the following methods to lose weight Watching portions or calories    Exercise    Atkins type diet (low carb/high protein)    " Slimfast           4/5/2023     9:58 AM   Weight Loss Questions Reviewed With Patient   How long have you been overweight? Following one or more pregnancies       SUPPLEMENT INFORMATION:  Vitamin D    NUTRITION HISTORY:  NKFA. Does not tolerate greasy foods, example pt provided - ground beef  Previous experiences with loss: diet gummies (just made her go to the bathroom more), increased exercise.   She has 6 kids, 3-17 years old. 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   Often portioning out more food than she finds she can eat.     RD visit 3/16/23 - Starting PT for knee and back.. Needs to set up psych eval, and establish with new therapist and psychiatrist.. Reducing pop and increasing water or using sugar-free beverages. Feeling puckett sooner at meals, after a few bites. Less appetite overall.     RD visit 4/17/23 - Continues to do PT, but describes worsening back and leg pain. Making it difficult to work, get quality sleep and participate in PT consistently. Recently she was admitted for kidney infection and pancreatitis. Has poor appetite r/t pain. Notes BP has been really high lately. Has visit scheduled with PCP today.      Today - Working on collect clearances for surgery. Mom was hospitalized this past month and has been very stressful for pt. She continues to have little appetite. She is using protein shakes as meal replacements intermittently. She is drinking quite a bot of juice, but is staying away from pop.    Diet Recall:  Breakfast: 10 am oranges and water   Lunch: skip  Dinner: protein shake   Beverages: Water (64+ oz/day), Gatorade zero, cran and OJ juice.     Progress Towards Weight Loss:   1) Replace 2 meals daily with protein shake. At third meal focus on palm-sized pc of meat/fish/egg/protien and fruit/vegetable. - Improving  2) Calorie-free hydration only. Drink 64+ oz/day - Not met  3) Have weight checked at upcoming PCP visit - Met    Eating Habits 12/27/2021   Do you  have any dietary restrictions? No   Do you currently binge eat (eat a large amount of food in a short time)? Yes - not recently, feeling puckett quickers   Are you an emotional eater? Yes - depressed    Do you get up to eat after falling asleep? Yes   What foods do you crave? 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  Walking a lot more in the summer.          4/5/2023     9:58 AM   Dining Out History Reviewed With Patient   How often do you dine out? Rarely.   Where do you dine out? (select all that apply) sit-down restaurants   What types of food do you order when you dine out? Depends we're I go           4/5/2023     9:58 AM   Physical Activity Reviewed With Patient   How often do you exercise? 1 to 2 times per week   What is the duration of your exercise (in minutes)? 45 Minutes   What types of exercise do you do? walking   What keeps you from being more active? I am as active as I can possbily be    Pain         NUTRITION DIAGNOSIS:  Obesity r/t long history of positive energy balance aeb BMI >30 kg/m2. - continues    INTERVENTION:  Intervention Provided/Education Provided:  Reviewed post-op diet guidelines, ways to help prepare for post-op diet guidelines pre-operatively, portion/calorie-control, mindful eating and sources of protein.   Discussed using meal replacement shakes since she is not tolerating solid food as well right now.   Reviewed surgery task list.   Patient demonstrates understanding.   Provided pt with list of goals via [employer].         4/5/2023     9:58 AM   Questions Reviewed With Patient   How ready are you to make changes regarding your weight? Number 1 = Not ready at all to make changes up to 10 = very ready. 10   How confident are you that you can change? 1 = Not confident that you will be successful making changes up to 10 = very confident.  1       Expected Engagement: good     Follow-up: 7/13/23    GOALS:  1) Replace 2 meals daily with protein shake. At third meal focus on palm-sized pc of meat/fish/egg/protien and fruit/vegetable.   2) Calorie-free hydration only. Drink 64+ oz/day  3) Have weight checked weekly     Diet Guidelines after Weight Loss Surgery  [email or web address]       Time spent with patient: 15 minutes.  Kanwal Hunter RD, LD

## 2023-05-16 NOTE — PATIENT INSTRUCTIONS
Darryn Lindsey,    Follow-up with RD on 7/13    Thank you,    Kanwal Hunter, ABBY, LD  If you would like to schedule or reschedule an appointment with the RD, please call [phone]    Nutrition Goals  1) Replace 2 meals daily with protein shake. At third meal focus on palm-sized pc of meat/fish/egg/protien and fruit/vegetable.   2) Calorie-free hydration only. Drink 64+ oz/day  3) Have weight checked weekly     Diet Guidelines after Weight Loss Surgery  [email or web address]           COMPREHENSIVE WEIGHT MANAGEMENT PROGRAM  VIRTUAL SUPPORT GROUPS    For Support Group Information:      We offer support groups for patients who are working on weight loss and considering, preparing for or have had weight loss surgery.   There is no cost for this opportunity.  You are invited to attend the?Virtual Support Groups?provided by any of the following locations:    [facility] via Microsoft Teams with Katy Garza RN  2.   [place] via iRise with Alhaji Blount, PhD, LP  3.   [place] via iRise with Jesica Banuelos RN  4.   [facility] via Microsoft Teams with Jesica Granado [facility]    The following Support Group information can also be found on our website:  [email or web address]    [email or web address]    [facility] Weight Loss Surgery Support Group    [facility] Weight Loss Surgery Support Group  The support group is a patient-lead forum that meets monthly to share experiences, encouragement and education. It is open to those who have had weight loss surgery, are scheduled for surgery, or are considering surgery.   WHEN: This group meets on the 3rd Wednesday of each month from 5:00PM - 6:00PM virtually using Microsoft Teams.   FACILITATOR: Led by Katy Root RD, PENELOPE, RN, the program's Clinical Coordinator.   TO REGISTER: Please contact  "the clinic via My [employer] or call the nurse line directly at [phone] to inform our staff that you would like an invite sent to you and to let us know the email you would like the invite sent to. Prior to the meeting, a link with directions on how to join the meeting will be sent to you.    2023 Meetings April 19: Guest Speaker, Marquez Monterroso, RD, LD, \"Maintaining Weight Loss after Bariatric Surgery\".  May 17: \"Let's Talk\" a time for the group to share.  June 21: \"Let's Talk\" a time for the group to share.  July 19  August 16  September 20  October 18  November 15  December 20    [facility] Support Groups    Connections Bariatric Care Support Group?  This is open to all pre- and post- operative bariatric surgery patients as well as their support system.   WHEN: This group meets the 2nd Tuesday of each month from 6:30 PM - 8:00 PM virtually using Microsoft Teams.   FACILITATOR: Led by Alhaji Blount, Ph.D who is a Licensed Psychologist with the [facility] Comprehensive Weight Management Program.   TO REGISTER: Please send an email to Alhaji Blount, Ph.D., LP at?[email or web address]?if you would like an invitation to the group and to learn about using Microsoft Teams.    2023 Meetings April 11: Guest speaker, Katie Palacios MD, Bariatrician, [facility] Comprehensive Weight Management Program, \"Injectable Weight Loss Medications\".  May 9  Kimberley 13  July 11  August 8  September 12  October 10  November 14  December 12    Connections Post-Operative Bariatric Surgery Support Group  This is a support group for [facility] bariatric patients (and those external to [facility]) who have had bariatric surgery and are at least 3 months post-surgery.  WHEN: This support group meets the 4th Wednesday of the month from 11:00 AM - 12:00 PM virtually using Microsoft Teams.   FACILITATOR: Led by Certified Bariatric Nurse, Jesica Banuelos RN.   TO REGISTER: Please " send an email to Jesica at [email or web address] if you would like an invitation to the group and to learn about using Microsoft Teams.    2023 Meetings  April 26  May 24  Kimberley 28  July 26  August 23  September 27  October 25  November 22  December 27    [facility]   Healthy Lifestyle Virtual Support Group    Healthy Lifestyle Virtual Support Group?  This is 60 minutes of small group guided discussion, support and resources. All are welcome who want a healthy lifestyle.  WHEN: This group meets monthly on a Friday from 12:30 PM - 1:30 PM virtually using Microsoft Teams.  This group will meet the first Friday of the month beginning In July  FACILITATOR: Led by National Board Certified Health and , Jesica Granado [facility].   TO REGISTER: Please send an email to Jesica [email or web address] to receive monthly invites to the group or if you have any questions about having a health .  Prior to the meeting, a link with directions on how to join the meeting will be sent to you.    2023 Meetings  May 19: Let's Talk  June 9: Create Your Coaching Toolkit: Learn How to  Yourself  July 7: Let's Talk  August 4: Benefits of Fiber with ABBY Contreras  September 1: Show and Tell (share your aps, podcasts, recipes, hacks, books)  October 6 :Let's Talk  November 3: Introduction to Mindfulness   December 1: Let's Talk

## 2023-05-17 DIAGNOSIS — E66.01 CLASS 3 SEVERE OBESITY DUE TO EXCESS CALORIES WITH SERIOUS COMORBIDITY AND BODY MASS INDEX (BMI) OF 40.0 TO 44.9 IN ADULT (H): Primary | ICD-10-CM

## 2023-05-17 DIAGNOSIS — F33.9 RECURRENT MAJOR DEPRESSIVE DISORDER, REMISSION STATUS UNSPECIFIED (H): ICD-10-CM

## 2023-05-17 DIAGNOSIS — E66.813 CLASS 3 SEVERE OBESITY DUE TO EXCESS CALORIES WITH SERIOUS COMORBIDITY AND BODY MASS INDEX (BMI) OF 40.0 TO 44.9 IN ADULT (H): Primary | ICD-10-CM

## 2023-05-18 ENCOUNTER — THERAPY VISIT (OUTPATIENT)
Dept: SLEEP MEDICINE | Facility: CLINIC | Age: 38
End: 2023-05-18
Attending: INTERNAL MEDICINE
Payer: COMMERCIAL

## 2023-05-18 DIAGNOSIS — G47.33 OSA (OBSTRUCTIVE SLEEP APNEA): ICD-10-CM

## 2023-05-18 PROCEDURE — 95810 POLYSOM 6/> YRS 4/> PARAM: CPT | Performed by: INTERNAL MEDICINE

## 2023-05-18 NOTE — Clinical Note
FYI, repeat pleat sleep study shows no sleep apnea.  This is a high-quality study.  No contraindication to proceeding with bariatric surgery from a sleep medicine standpoint. TC

## 2023-05-19 PROBLEM — G47.33 OSA (OBSTRUCTIVE SLEEP APNEA): Status: RESOLVED | Noted: 2021-11-10 | Resolved: 2023-05-19

## 2023-05-19 NOTE — PROCEDURES
" SLEEP STUDY INTERPRETATION  DIAGNOSTIC POLYSOMNOGRAPHY REPORT      Patient: NATHANIEL IBARRA  YOB: 1985  Study Date: 5/18/2023  MRN: [id number]  Referring Provider: LEAH Arora Kristi  Ordering Provider: MD Denny Tereza    Indications for Polysomnography: The patient is a 37 year old Female who is 5' 6\" and weighs 254.0 lbs. Her BMI is 41.3, [place] sleepiness scale 5 and neck circumference is 37 cm. Relevant medical history includes mild obstructive sleep apnea (2017 at weight 300 lbs), diabetes, recurrent pancreatitis, obesity. A diagnostic polysomnogram was performed to evaluate for sleep apnea.    Polysomnogram Data: A full night polysomnogram recorded the standard physiologic parameters including EEG, EOG, EMG, ECG, nasal and oral airflow. Respiratory parameters of chest and abdominal movements were recorded with respiratory inductance plethysmography. Oxygen saturation was recorded by pulse oximetry. Hypopnea scoring rule used: 1B 4%.    Sleep Architecture: Normal sleep architecture.  The total recording time of the polysomnogram was 517.0 minutes. The total sleep time was 507.5 minutes. Sleep latency was normal at 5.5 minutes without the use of a sleep aid. REM latency was 93.5 minutes. Arousal index was increased at 28.3 arousals per hour. Sleep efficiency was normal at 98.2%. Wake after sleep onset was 4.0 minutes. The patient spent 3.9% of total sleep time in Stage N1, 58.6% in Stage N2, 15.7% in Stage N3, and 21.8% in REM. Time in REM supine was 91.5 minutes.    Respiration: Mild snoring without sleep apnea.    Events ? The polysomnogram revealed a presence of 1 obstructive, - central, and - mixed apneas resulting in an apnea index of 0.1 events per hour. There were 4 obstructive hypopneas and - central hypopneas resulting in an obstructive hypopnea index of 0.5 and central hypopnea index of - events per hour. The combined apnea/hypopnea index was 0.6 events per hour (central " apnea/hypopnea index was - events per hour). The REM AHI was 2.7 events per hour. The supine AHI was 0.6 events per hour. The RERA index was 0.1 events per hour.  The RDI was 0.7 events per hour.    Snoring - was reported as mild.    Respiratory rate and pattern - was notable for normal respiratory rate and pattern.    Sustained Sleep Associated Hypoventilation - Transcutaneous carbon dioxide monitoring was not used, however significant hypoventilation was not suggested by oximetry.    Sleep Associated Hypoxemia - (Greater than 5 minutes O2 sat at or below 88%) was not present. Baseline oxygen saturation was 94.5%. Lowest oxygen saturation was 86.0%. Time spent less than or equal to 88% was 0.5 minutes. Time spent less than or equal to 89% was 0.6 minutes.    Movement Activity: Frequent periodic limb movements.    Periodic Limb Activity - There were 336 PLMs during the entire study. The PLM index was 39.7 movements per hour. The PLM Arousal Index was 11.8 per hour.    REM EMG Activity - Excessive transient/sustained muscle activity was not present.    Nocturnal Behavior - Abnormal sleep related behaviors were not noted.    Bruxism - None apparent.    Cardiac Summary: Normal sinus rhythm and rates.  The average pulse rate was 77.1 bpm. The minimum pulse rate was 63.0 bpm while the maximum pulse rate was 107.0 bpm.  Arrhythmias were not noted.      Assessment:     Mild snoring without sleep apnea, overall AHI 0.6 events per hour.    Normal sleep architecture.    Frequent periodic limb movements (PLM arousal index 11.8)    Normal sinus rhythm and rates.      Recommendations:    Weight management (BMI > 30).    Pharmacologic therapy should be used for management of restless legs syndrome only if present and clinically indicated and not based on the presence of periodic limb movements alone.    Advice regarding the risks of drowsy driving.    Suggest optimizing sleep schedule and avoiding sleep deprivation.    Diagnostic  Codes:   Periodic Limb Movement Disorder G47.61        _____________________________________   Electronically Signed By: Yumiko Denny MD 5/18/2023

## 2023-05-22 LAB — SLPCOMP: NORMAL

## 2023-06-16 ENCOUNTER — CARE COORDINATION (OUTPATIENT)
Dept: ENDOCRINOLOGY | Facility: CLINIC | Age: 38
End: 2023-06-16
Payer: COMMERCIAL

## 2023-06-16 DIAGNOSIS — E66.01 MORBID OBESITY (H): Primary | ICD-10-CM

## 2023-06-16 NOTE — PROGRESS NOTES
Tasklist updated and sent to patient via [employer].  Bariatric Task List  Fax:  Please fax all paperwork to: [phone] -     Status:  Is patient a candidate for bariatric surgery?:    -     Cleared to schedule surgeon consult?:    - After psychological evaluation and letters of support from therapist and psychiatrist. [facility]   Status:  surgery evaluation in process -     Surgeon: Yany -     Emmaative surgery month/year: TBD -        Insurance: Insurance:  [facility] -        Patient Info: Initial Weight:  252 -     Date of Initial Weight/Height:  12/27/2021 -     Goal Weight (lbs):  242 -     Required Weight Loss:  10 -     Surgery Type:  sleeve gastrectomy -        Dietician Visits: Structured weight loss required by insurance?:  structured weight loss required -     Dietician Visit 1:  Completed - 1/20/22. [facility]   Dietician Visit 2:  Completed - 6/30/22    Dietician Visit 3:  Completed - 8/5/22 KB   Dietician Visit 4:  Completed - 9/6/22 KB   Dietician Visit 5:  Completed - 3/16/23    Dietician Visit 6:  Completed - 4/17/23 KB   Dietician Visit additional:  Needed - Monthly until surgery for weight loss and postop diet teaching. [facility]   Dietician Notes:  Call [phone] to schedule monthly dietitian visits. [facility];5/16/23, 7/13/23 appt. -        Psychological Evaluation: Psych eval:  Needed - 10/17/22, 10/24/22 (ask if there is a 3rd appointment- usually there is 3 appointments). [facility]   Therapist letter of support:  Needed -     Psychiatrist letter of support:  Needed -     Establish care with therapist:  Needed -     Other:  establish with psychiatry - Seeing Carlo Guzman at [facility], last visit 7/7/22 -       Lab Work: Complete Blood Count:  Completed - 4/4/23 [facility]   Comprehensive Metabolic Panel:  Completed - 4/4/23, 3/24/23. s   Vitamin D:  Needed -     PTH:  Needed -     Hgb A1c:  Needed -      Lipids: Needed -      TSH (ARE, [facility], [place] MA): Needed - 12/14/21. s   Vitamin A: Needed -       "  Consults/ Clearance Sleep Medicine:  Needed - Call [phone] to schedule.10/26/22 Yumiko Hawkins MD appt.  Need sleep study to determine clearance- done 5/1823 shows mild sleep apnea.  bks   Medical Weight Management: Completed -        Testing: Sleep Study: Completed - Requested by Dr Hawkins. Done 5/18/23.bks   Other:   -     Other:    -        PCP: Establish care with PCP:  Completed -     Follow up with PCP:    -     PCP letter of support:  Needed -        Stopping Smoking/ Alcohol Use: Quit alcohol use: Completed - Data deleted      Patient Education:  Information Session:  Completed -     Given \"Making your decision\" handout?:  Yes -     Given \"A Roadmap to you Weight Loss Surgery\" handout?: Yes -     Given \"Get Well Loop\" information?: Yes -     Given support group information?:    -  Yes   Attended support group?:    -     Support plan in place?:  Completed -        Final Tasks:  Before surgery online class:  Needed -     Before surgery online class website link:  [email or web address]   After surgery online class:  Needed -     After surgery online class website link:  [email or web address]   Nurse visit per clinic:  Needed -     History and Physical per clinic:   -  PreAssessment Clinic   Final labs per clinic: Needed -        Notes: Effective in February 2023, please register for the Weight Loss Surgery Care Companion Pathway through the [employer] mobile noemi or via web browser after you receive an invite.   Information from this care journey can help you be more successful before and after surgery, for up to one year after your surgical procedure. Your Care Companion Pathway through [employer] can also help answer any questions you might have related to the surgery.    The Pathway has 3 Phases:  Phase 1 starts when you get your Tasklist after your initial consultation with us or when you decide to start preparing for weight loss surgery.  Phase 2 starts when your " surgery is scheduled.  Phase 3 starts on the day of discharge from the hospital.  You will be started on Phase 2    A patient can only be connected to one Care Companion journey at any given time. You can remove or re-enroll yourself from the Weight Loss Surgery Care Companion Pathway by contacting us at [phone].     Your Weight Loss Surgery Team  Cl[facility]ics [facility] Surgery Center  Ph:[phone]

## 2023-06-29 ENCOUNTER — LAB (OUTPATIENT)
Dept: LAB | Facility: CLINIC | Age: 38
End: 2023-06-29
Payer: COMMERCIAL

## 2023-06-29 DIAGNOSIS — E66.01 MORBID OBESITY (H): ICD-10-CM

## 2023-06-29 LAB
CHOLEST SERPL-MCNC: 188 MG/DL
DEPRECATED CALCIDIOL+CALCIFEROL SERPL-MC: 17 UG/L (ref 20–75)
HDLC SERPL-MCNC: 71 MG/DL
LDLC SERPL CALC-MCNC: 92 MG/DL
NONHDLC SERPL-MCNC: 117 MG/DL
PTH-INTACT SERPL-MCNC: 58 PG/ML (ref 15–65)
TRIGL SERPL-MCNC: 124 MG/DL
TSH SERPL DL<=0.005 MIU/L-ACNC: 0.77 UIU/ML (ref 0.3–4.2)

## 2023-06-29 PROCEDURE — 82306 VITAMIN D 25 HYDROXY: CPT | Performed by: PHYSICIAN ASSISTANT

## 2023-06-29 PROCEDURE — 80061 LIPID PANEL: CPT | Performed by: PATHOLOGY

## 2023-06-29 PROCEDURE — 84590 ASSAY OF VITAMIN A: CPT | Mod: 90 | Performed by: PATHOLOGY

## 2023-06-29 PROCEDURE — 84443 ASSAY THYROID STIM HORMONE: CPT | Performed by: PATHOLOGY

## 2023-06-29 PROCEDURE — 99000 SPECIMEN HANDLING OFFICE-LAB: CPT | Performed by: PATHOLOGY

## 2023-06-29 PROCEDURE — 83970 ASSAY OF PARATHORMONE: CPT | Performed by: PATHOLOGY

## 2023-06-29 PROCEDURE — 36415 COLL VENOUS BLD VENIPUNCTURE: CPT | Performed by: PATHOLOGY

## 2023-07-02 LAB
ANNOTATION COMMENT IMP: NORMAL
RETINYL PALMITATE SERPL-MCNC: <0.02 MG/L
VIT A SERPL-MCNC: 0.45 MG/L

## 2023-07-11 NOTE — PROGRESS NOTES
"César Son is a 37 year old female who is being evaluated via a billable video visit.      The patient has been notified of following:     \"This video visit will be conducted via a call between you and your physician/provider. We have found that certain health care needs can be provided without the need for an in-person physical exam.  This service lets us provide the care you need with a video conversation.  If a prescription is necessary we can send it directly to your pharmacy.  If lab work is needed we can place an order for that and you can then stop by our lab to have the test done at a later time.    Video visits are billed at different rates depending on your insurance coverage.  Please reach out to your insurance provider with any questions.    If during the course of the call the physician/provider feels a video visit is not appropriate, you will not be charged for this service.\"    Patient has given verbal consent for Video visit? Yes  How would you like to obtain your AVS? MyChart  If you are dropped from the video visit, the video invite should be resent to: Text to cell phone: [phone]  Will anyone else be joining your video visit? No  {If patient encounters technical issues they should call [phone]      Video-Visit Details    Type of service:  Video Visit    Video Start Time: 1:01 PM   Video End Time: 1:25 PM    Originating Location (pt. Location): Home    Distant Location (provider location):  Offsite (providers home) [facility] [place]     Platform used for Video Visit: [email or web address]    During this virtual visit the patient is located in [place], patient verifies this as the location during the entirety of this visit.       Bariatric Nutrition Consultation Note    Reason For Visit: Nutrition Assessment    César Son is a 36 year old presenting today for return bariatric nutrition consult.   Pt is interested in laparoscopic sleeve gastrectomy with Dr." "Yany.  Patient is accompanied by self.  This is pt's 6th of 6 required nutrition visits prior to surgery.     Coordination note: Needs to complete Sleep Study. She reports attempting to do at home, but did not work so plans to request to do at sleep clinic. Sleep visit scheduled 5/18/23  Needs to establish with therapy and psychiatrist   She has appt with PCP today and plans to request letter of support.   Tried Suzan Brewer but insurance did not cover meeting with her. Plans to call Alhaji Farris to schedule next.   Needs a psychiatris still. Number and new referral need?     Pt referred by DILLON Vu on January 18, 2022.  Patient with Co-morbidities of obesity including:  Type II DM yes  Renal Failure no  Sleep apnea yes  Hypertension yes   Dyslipidemia no  Joint pain yes  Back pain yes  GERD no         4/5/2023     9:58 AM   Support System Reviewed With Patient   Who do you have in your support network that can be available to help you for the first 2 weeks after surgery? Myself and myself and my kids   Who can you count on for support throughout your weight loss surgery journey? Myself and my kids       ANTHROPOMETRICS:  Initial consult:  Estimated body mass index is 40.77 kg/m  as calculated from the following:    Height as of 12/27/21: 1.676 m (5' 6\").    Weight as of 12/27/21: 114.6 kg (252 lb 9.6 oz).    Current weight (pt reported):   Estimated body mass index is 41.99 kg/m  as calculated from the following:    Height as of this encounter: 1.676 m (5' 5.98\").    Weight as of this encounter: 117.9 kg (260 lb).  (- 10 lbs over the last 2 months, +18 lbs from initial)      Required weight loss goal pre-op: 10 lbs from initial consult weight (goal weight 242 lbs or less before surgery)        4/5/2023     9:58 AM   --   I have tried the following methods to lose weight Watching portions or calories    Exercise    Atkins type diet (low carb/high protein)    Slimfast           4/5/2023     9:58 AM "   Weight Loss Questions Reviewed With Patient   How long have you been overweight? Following one or more pregnancies       SUPPLEMENT INFORMATION:  None     Baseline bariatric labs completed 6/29/23:  - Vitamin D 17 (L) - pt states she has not been taking vitamin D supplement in quite a while.   - All other labs normal       NUTRITION HISTORY:  NKFA. Does not tolerate greasy foods, example pt provided - ground beef  Previous experiences with loss: diet gummies (just made her go to the bathroom more), increased exercise.   She has 6 kids, 3-17 years old. 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   Often portioning out more food than she finds she can eat.     RD visit 3/16/23 - Starting PT for knee and back.. Needs to set up psych eval, and establish with new therapist and psychiatrist.. Reducing pop and increasing water or using sugar-free beverages. Feeling puckett sooner at meals, after a few bites. Less appetite overall.     RD visit 4/17/23 - Continues to do PT, but describes worsening back and leg pain. Making it difficult to work, get quality sleep and participate in PT consistently. Recently she was admitted for kidney infection and pancreatitis. Has poor appetite r/t pain. Notes BP has been really high lately. Has visit scheduled with PCP today.      Last RD visit 5/16/23 - Working on collect clearances for surgery. Mom was hospitalized this past month and has been very stressful for pt. She continues to have little appetite. She is using protein shakes as meal replacements intermittently. She is drinking quite a bot of juice, but is staying away from pop.    Today - Mom passed away in June. Pt has been experiencing low appetite since mom passed. Not eating much. Working on eating earlier in the day. Making good progress on collecting clearances for surgery.     Progress Towards Weight Loss:   1) Replace 2 meals daily with protein shake. At third meal focus on palm-sized pc of  meat/fish/egg/protien and fruit/vegetable. - Not met, protein shake cost has been prohibitive. Plan to order shakes today through pharmacy to see if covered by insurance. Is eating fruit more often.   2) Calorie-free hydration only. Drink 64+ oz/day - Met, continues calorie free hydration.   3) Have weight checked weekly - Met, continues       ADDITIONAL INFORMATION:        4/5/2023     9:58 AM   Physical Activity Reviewed With Patient   How often do you exercise? 1 to 2 times per week   What is the duration of your exercise (in minutes)? 45 Minutes   What types of exercise do you do? walking   What keeps you from being more active? I am as active as I can possbily be    Pain         NUTRITION DIAGNOSIS:  Obesity r/t long history of positive energy balance aeb BMI >30 kg/m2. - continues    INTERVENTION:  Intervention Provided/Education Provided:  Reviewed post-op diet guidelines, ways to help prepare for post-op diet guidelines pre-operatively, portion/calorie-control, mindful eating and sources of protein.   Discussed using meal replacement shakes to help meet nutrition needs with low appetite. Ordered protein shakes through pharmacy.   Reviewed surgery task list and recent labs. Ordered vitamin D supplement for repletion. Plan to recheck vitamin D after 8 weeks of repletion.   Reviewed task list at monthly team meeting.   Patient demonstrates understanding.   Provided pt with list of goals via [employer].         4/5/2023     9:58 AM   Questions Reviewed With Patient   How ready are you to make changes regarding your weight? Number 1 = Not ready at all to make changes up to 10 = very ready. 10   How confident are you that you can change? 1 = Not confident that you will be successful making changes up to 10 = very confident. 1       Expected Engagement: good     Follow-up: Plan to complete [facility] W[facility] nutrition class and return to individual nutrition counseling in Sept.     GOALS:  1) Add protein shake during the  day  2) Calorie-free hydration only. Drink 64+ oz/day    Diet Guidelines after Weight Loss Surgery  [email or web address]       Time spent with patient: 24 minutes.  Kanwal Hunter RD, LD

## 2023-07-13 ENCOUNTER — VIRTUAL VISIT (OUTPATIENT)
Dept: ENDOCRINOLOGY | Facility: CLINIC | Age: 38
End: 2023-07-13
Payer: COMMERCIAL

## 2023-07-13 VITALS — HEIGHT: 66 IN | BODY MASS INDEX: 41.78 KG/M2 | WEIGHT: 260 LBS

## 2023-07-13 DIAGNOSIS — E66.01 CLASS 3 SEVERE OBESITY DUE TO EXCESS CALORIES WITH SERIOUS COMORBIDITY AND BODY MASS INDEX (BMI) OF 40.0 TO 44.9 IN ADULT (H): ICD-10-CM

## 2023-07-13 DIAGNOSIS — Z71.3 NUTRITIONAL COUNSELING: Primary | ICD-10-CM

## 2023-07-13 DIAGNOSIS — E66.813 CLASS 3 SEVERE OBESITY DUE TO EXCESS CALORIES WITH SERIOUS COMORBIDITY AND BODY MASS INDEX (BMI) OF 40.0 TO 44.9 IN ADULT (H): ICD-10-CM

## 2023-07-13 DIAGNOSIS — Z98.84 BARIATRIC SURGERY STATUS: ICD-10-CM

## 2023-07-13 PROCEDURE — 97803 MED NUTRITION INDIV SUBSEQ: CPT | Mod: 95 | Performed by: DIETITIAN, REGISTERED

## 2023-07-13 PROCEDURE — 99207 PR NO CHARGE LOS: CPT | Mod: 95 | Performed by: DIETITIAN, REGISTERED

## 2023-07-13 RX ORDER — CHOLECALCIFEROL (VITAMIN D3) 50 MCG
3 TABLET ORAL DAILY
Qty: 180 TABLET | Refills: 0 | Status: SHIPPED | OUTPATIENT
Start: 2023-07-13 | End: 2023-07-27

## 2023-07-13 RX ORDER — KETOTIFEN FUMARATE 0.025 %
1 DROPS OPHTHALMIC (EYE) DAILY
Qty: 7200 ML | Refills: 11 | Status: SHIPPED | OUTPATIENT
Start: 2023-07-13

## 2023-07-13 ASSESSMENT — PATIENT HEALTH QUESTIONNAIRE - PHQ9: SUM OF ALL RESPONSES TO PHQ QUESTIONS 1-9: 10

## 2023-07-13 ASSESSMENT — PAIN SCALES - GENERAL: PAINLEVEL: EXTREME PAIN (8)

## 2023-07-13 NOTE — NURSING NOTE
Is the patient currently in the state of [place]? YES    Visit mode:VIDEO    If the visit is dropped, the patient can be reconnected by: VIDEO VISIT: Text to cell phone: [phone]    Will anyone else be joining the visit? NO      How would you like to obtain your AVS? MyChart    Are changes needed to the allergy or medication list? NO    Reason for visit: Follow Up      Score High PHQ2&9, decline triage nurse, taking medications for depression.

## 2023-07-13 NOTE — LETTER
"7/13/2023       RE: César Son  [address] [place] [address]     Dear Colleague,    Thank you for referring your patient, César Son, to the [facility]NAGEMENT [facility]IN[facility] [place] at [facility]. Please see a copy of my visit note below.    César Son is a 37 year old female who is being evaluated via a billable video visit.      The patient has been notified of following:     \"This video visit will be conducted via a call between you and your physician/provider. We have found that certain health care needs can be provided without the need for an in-person physical exam.  This service lets us provide the care you need with a video conversation.  If a prescription is necessary we can send it directly to your pharmacy.  If lab work is needed we can place an order for that and you can then stop by our lab to have the test done at a later time.    Video visits are billed at different rates depending on your insurance coverage.  Please reach out to your insurance provider with any questions.    If during the course of the call the physician/provider feels a video visit is not appropriate, you will not be charged for this service.\"    Patient has given verbal consent for Video visit? Yes  How would you like to obtain your AVS? MyChart  If you are dropped from the video visit, the video invite should be resent to: Text to cell phone: [phone]  Will anyone else be joining your video visit? No  {If patient encounters technical issues they should call [phone]      Video-Visit Details    Type of service:  Video Visit    Video Start Time: 1:01 PM   Video End Time: 1:25 PM    Originating Location (pt. Location): Home    Distant Location (provider location):  Offsite (providers home) [facility] MANAGEMENT [facility]     Platform used for Video Visit: Cloud[employer]    During this virtual visit the patient is located " "in [place], patient verifies this as the location during the entirety of this visit.       Bariatric Nutrition Consultation Note    Reason For Visit: Nutrition Assessment    César Son is a 36 year old presenting today for return bariatric nutrition consult.   Pt is interested in laparoscopic sleeve gastrectomy with Dr. Slade.  Patient is accompanied by self.  This is pt's 6th of 6 required nutrition visits prior to surgery.     Coordination note: Needs to complete Sleep Study. She reports attempting to do at home, but did not work so plans to request to do at sleep clinic. Sleep visit scheduled 5/18/23  Needs to establish with therapy and psychiatrist   She has appt with PCP today and plans to request letter of support.   Tried Suzan Brewer but insurance did not cover meeting with her. Plans to call Alhaji Farris to schedule next.   Needs a psychiatris still. Number and new referral need?     Pt referred by DILLON Vu on January 18, 2022.  Patient with Co-morbidities of obesity including:  Type II DM yes  Renal Failure no  Sleep apnea yes  Hypertension yes   Dyslipidemia no  Joint pain yes  Back pain yes  GERD no         4/5/2023     9:58 AM   Support System Reviewed With Patient   Who do you have in your support network that can be available to help you for the first 2 weeks after surgery? Myself and myself and my kids   Who can you count on for support throughout your weight loss surgery journey? Myself and my kids       ANTHROPOMETRICS:  Initial consult:  Estimated body mass index is 40.77 kg/m  as calculated from the following:    Height as of 12/27/21: 1.676 m (5' 6\").    Weight as of 12/27/21: 114.6 kg (252 lb 9.6 oz).    Current weight (pt reported):   Estimated body mass index is 41.99 kg/m  as calculated from the following:    Height as of this encounter: 1.676 m (5' 5.98\").    Weight as of this encounter: 117.9 kg (260 lb).  (- 10 lbs over the last 2 months, +18 lbs from initial)      Required " weight loss goal pre-op: 10 lbs from initial consult weight (goal weight 242 lbs or less before surgery)        4/5/2023     9:58 AM   --   I have tried the following methods to lose weight Watching portions or calories    Exercise    Atkins type diet (low carb/high protein)    Slimfast           4/5/2023     9:58 AM   Weight Loss Questions Reviewed With Patient   How long have you been overweight? Following one or more pregnancies       SUPPLEMENT INFORMATION:  None     Baseline bariatric labs completed 6/29/23:  - Vitamin D 17 (L) - pt states she has not been taking vitamin D supplement in quite a while.   - All other labs normal       NUTRITION HISTORY:  NKFA. Does not tolerate greasy foods, example pt provided - ground beef  Previous experiences with loss: diet gummies (just made her go to the bathroom more), increased exercise.   She has 6 kids, 3-17 years old. 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   Often portioning out more food than she finds she can eat.     RD visit 3/16/23 - Starting PT for knee and back.. Needs to set up psych eval, and establish with new therapist and psychiatrist.. Reducing pop and increasing water or using sugar-free beverages. Feeling puckett sooner at meals, after a few bites. Less appetite overall.     RD visit 4/17/23 - Continues to do PT, but describes worsening back and leg pain. Making it difficult to work, get quality sleep and participate in PT consistently. Recently she was admitted for kidney infection and pancreatitis. Has poor appetite r/t pain. Notes BP has been really high lately. Has visit scheduled with PCP today.      Last RD visit 5/16/23 - Working on collect clearances for surgery. Mom was hospitalized this past month and has been very stressful for pt. She continues to have little appetite. She is using protein shakes as meal replacements intermittently. She is drinking quite a bot of juice, but is staying away from pop.    Today - Mom passed  away in June. Pt has been experiencing low appetite since mom passed. Not eating much. Working on eating earlier in the day. Making good progress on collecting clearances for surgery.     Progress Towards Weight Loss:   1) Replace 2 meals daily with protein shake. At third meal focus on palm-sized pc of meat/fish/egg/protien and fruit/vegetable. - Not met, protein shake cost has been prohibitive. Plan to order shakes today through pharmacy to see if covered by insurance. Is eating fruit more often.   2) Calorie-free hydration only. Drink 64+ oz/day - Met, continues calorie free hydration.   3) Have weight checked weekly - Met, continues       ADDITIONAL INFORMATION:        4/5/2023     9:58 AM   Physical Activity Reviewed With Patient   How often do you exercise? 1 to 2 times per week   What is the duration of your exercise (in minutes)? 45 Minutes   What types of exercise do you do? walking   What keeps you from being more active? I am as active as I can possbily be    Pain         NUTRITION DIAGNOSIS:  Obesity r/t long history of positive energy balance aeb BMI >30 kg/m2. - continues    INTERVENTION:  Intervention Provided/Education Provided:  Reviewed post-op diet guidelines, ways to help prepare for post-op diet guidelines pre-operatively, portion/calorie-control, mindful eating and sources of protein.   Discussed using meal replacement shakes to help meet nutrition needs with low appetite. Ordered protein shakes through pharmacy.   Reviewed surgery task list and recent labs. Ordered vitamin D supplement for repletion. Plan to recheck vitamin D after 8 weeks of repletion.   Reviewed task list at monthly team meeting.   Patient demonstrates understanding.   Provided pt with list of goals via "[employer].         4/5/2023     9:58 AM   Questions Reviewed With Patient   How ready are you to make changes regarding your weight? Number 1 = Not ready at all to make changes up to 10 = very ready. 10   How confident are you  that you can change? 1 = Not confident that you will be successful making changes up to 10 = very confident. 1       Expected Engagement: good     [facility] to individual nutrition counseling in Sept.     GOALS:  1) Add protein shake during the day  2) Calorie-free hydration only. Drink 64+ oz/day    Diet Guidelines after Weight Loss Surgery  [email or web address]       Time spent with patient: 24 minutes.  Kanwal Hunter RD, LD

## 2023-07-13 NOTE — PATIENT INSTRUCTIONS
Darryn Lindsey,    Follow-up with RD on 8/11 for nutrition class.     Thank you,    Kanwal Hunter, ABBY, LD  If you would like to schedule or reschedule an appointment with the RD, please call [phone]    Nutrition Goals  1) Add protein shake during the day  2) Calorie-free hydration only. Drink 64+ oz/day    Diet Guidelines after Weight Loss Surgery  [email or web address]           COMPREHENSIVE WEIGHT MANAGEMENT PROGRAM  VIRTUAL SUPPORT GROUPS    For Support Group Information:      We offer support groups for patients who are working on weight loss and considering, preparing for or have had weight loss surgery.   There is no cost for this opportunity.  You are invited to attend the?Virtual Support Groups?provided by any of the following locations:    [facility] via Microsoft Teams with Katy Garza RN  2.   [place] via [employer] with Alhaji Blount, PhD, LP  3.   [place] via [employer] Resource Group with Jesica Banuelos RN  4.   [facility] via Microsoft Teams with Jesica Granado [facility]    The following Support Group information can also be found on our website:  [email or web address]    [email or web address]    [facility] Weight Loss Surgery Support Group    [facility] Weight Loss Surgery Support Group  The support group is a patient-lead forum that meets monthly to share experiences, encouragement and education. It is open to those who have had weight loss surgery, are scheduled for surgery, or are considering surgery.   WHEN: This group meets on the 3rd Wednesday of each month from 5:00PM - 6:00PM virtually using Microsoft Teams.   FACILITATOR: Led by Katy Root RD, LD, RN, the program's Clinical Coordinator.   TO REGISTER: Please contact the clinic via [employer] or call the nurse line directly at [phone] to inform our staff that you  "would like an invite sent to you and to let us know the email you would like the invite sent to. Prior to the meeting, a link with directions on how to join the meeting will be sent to you.    2023 Meetings April 19: Guest Speaker, Marquez Monterroso, RD, LD, \"Maintaining Weight Loss after Bariatric Surgery\".  May 17: \"Let's Talk\" a time for the group to share.  June 21: \"Let's Talk\" a time for the group to share.  July 19  August 16  September 20  October 18  November 15  December 20    [facility] Support Groups    [facility]ctions Bariatric Care Support Group?  This is open to all pre- and post- operative bariatric surgery patients as well as their support system.   WHEN: This group meets the 2nd Tuesday of each month from 6:30 PM - 8:00 PM virtually using Microsoft Teams.   FACILITATOR: Led by Alhaji Blount, Ph.D who is a Licensed Psychologist with the [facility] Comprehensive Weight Management Program.   TO REGISTER: Please send an email to Alhaji Blount, Ph.D., LP [email or web address]?if you would like an invitation to the group and to learn about using Microsoft Teams.    2023 Meetings April 11: Guest speaker, Katie Palacios MD, Bariatrician, [facility] Comprehensive Weight Management Program, \"Injectable Weight Loss Medications\".  May 9  Kimberley 13  July 11  August 8  September 12  October 10  November 14  December 12    Connections Post-Operative Bariatric Surgery Support Group  This is a support group for [facility] bariatric patients (and those external to [facility]) who have had bariatric surgery and are at least 3 months post-surgery.  WHEN: This support group meets the 4th Wednesday of the month from 11:00 AM - 12:00 PM virtually using Microsoft Teams.   FACILITATOR: Led by Certified Bariatric Nurse, Jesica Banuelos RN.   TO REGISTER: Please send an email to Jesica at [email or web address] if you would like an invitation to the group and " to learn about using Microsoft Teams.    2023 Meetings  April 26  May 24  Kimberley 28  July 26  August 23  September 27  October 25  November 22  December 27    [facility]   Healthy Lifestyle Virtual Support Group    Healthy Lifestyle Virtual Support Group?  This is 60 minutes of small group guided discussion, support and resources. All are welcome who want a healthy lifestyle.  WHEN: This group meets monthly on a Friday from 12:30 PM - 1:30 PM virtually using Microsoft Teams.  This group will meet the first Friday of the month beginning In July  FACILITATOR: Led by National Board Certified Health and , Jesica Granado [facility].   TO REGISTER: Please send an email to Jesica [email or web address] to receive monthly invites to the group or if you have any questions about having a health .  Prior to the meeting, a link with directions on how to join the meeting will be sent to you.    2023 Meetings  May 19: Let's Talk  June 9: Create Your Coaching Toolkit: Learn How to  Yourself  July 7: Let's Talk  August 4: Benefits of Fiber with ABBY Contreras  September 1: Show and Tell (share your aps, podcasts, recipes, hacks, books)  October 6 :Let's Talk  November 3: Introduction to Mindfulness   December 1: Let's Talk

## 2023-07-27 ENCOUNTER — VIRTUAL VISIT (OUTPATIENT)
Dept: ENDOCRINOLOGY | Facility: CLINIC | Age: 38
End: 2023-07-27
Payer: COMMERCIAL

## 2023-07-27 VITALS — BODY MASS INDEX: 41.78 KG/M2 | HEIGHT: 66 IN | WEIGHT: 260 LBS

## 2023-07-27 DIAGNOSIS — E66.813 CLASS 3 SEVERE OBESITY DUE TO EXCESS CALORIES WITH SERIOUS COMORBIDITY AND BODY MASS INDEX (BMI) OF 40.0 TO 44.9 IN ADULT (H): ICD-10-CM

## 2023-07-27 DIAGNOSIS — E66.01 CLASS 3 SEVERE OBESITY DUE TO EXCESS CALORIES WITH SERIOUS COMORBIDITY AND BODY MASS INDEX (BMI) OF 40.0 TO 44.9 IN ADULT (H): ICD-10-CM

## 2023-07-27 PROCEDURE — 99214 OFFICE O/P EST MOD 30 MIN: CPT | Mod: 95 | Performed by: PHYSICIAN ASSISTANT

## 2023-07-27 RX ORDER — TOPIRAMATE 25 MG/1
TABLET, FILM COATED ORAL
Qty: 90 TABLET | Refills: 1 | Status: SHIPPED | OUTPATIENT
Start: 2023-07-27 | End: 2023-12-21

## 2023-07-27 RX ORDER — CHOLECALCIFEROL (VITAMIN D3) 50 MCG
3 TABLET ORAL DAILY
Qty: 180 TABLET | Refills: 3 | Status: SHIPPED | OUTPATIENT
Start: 2023-07-27

## 2023-07-27 ASSESSMENT — PAIN SCALES - GENERAL: PAINLEVEL: SEVERE PAIN (7)

## 2023-07-27 NOTE — LETTER
2023       RE: César Son  [address] [place] [address]     Dear Colleague,    Thank you for referring your patient, César Son, to the [facility] [place] at [facility]. Please see a copy of my visit note below.      Return Medical Weight Management Note     César Son  MRN:  [id number]  :  1985  CRISSY:  2023    Dear CRISS SPENCE CNP,    I had the pleasure of seeing your patient César Son. She is a 37 year old female who I am continuing to see for treatment of obesity related to:        2023     9:58 AM   --   I have the following health issues associated with obesity Pre-Diabetes    High Blood Pressure    Asthma       Assessment & Plan  Problem List Items Addressed This Visit          Endocrine Diagnoses    Class 3 severe obesity due to excess calories with serious comorbidity and body mass index (BMI) of 40.0 to 44.9 in adult (H)    Relevant Medications    topiramate (TOPAMAX) 25 MG tablet      Weight mgmt follow up  Plan for sleeve gastrectomy with Dr Slade  Working on psych eval and preop weight loss.  Labs done 23, low vitamin D    Anti obesity medication plan: Restart topiramate ramp to 75mg. No hx of kidney stones. Tubal ligation. Prescribed in April but pt doesn't recall starting it.  Vit D deficiency: Start D3 6000 international unit(s) daily.  Need to lost 18 lbs from today's weight to reach preop goal weight  Complete psych eval and set up new therapist as already scheduled.  Does not need to see a psychiatrist unless recommended by psych eval or therapist  See Dr Slade to discuss sleeve after psych eval    Follow up 3 months return MWM Renetta        INTERVAL HISTORY:      Still planning for surgery. Working on tasklist  -psych eval 10/3 Meme Catherine  -new therapist Dr Blunt 24  -PCP manages bupropion and vistaril for depression/anxiety    Low vitamin D  17, increased D3 recently to 6000 international unit(s) daily    Consult wt: 252 and goal weight 242 lbs. Today 260 lbs.     Anti-obesity medications:     Current: None    Failed/contraindicated:   Topiramate prescribed April 2022 but she can't recall if she started it or not.    Avoid GLP1 hx of pancreatitis      CURRENT WEIGHT:   260 lbs 0 oz    Initial Weight (lbs): 252.6 lbs  Last Visits Weight: 117.9 kg (260 lb)  Cumulative weight loss (lbs): -7.4  Weight Loss Percentage: -2.93%         No data to display                  MEDICATIONS:   Current Outpatient Medications   Medication Sig Dispense Refill    topiramate (TOPAMAX) 25 MG tablet 25mg at bedtime for week 1, 50mg at bedtime for 1 week, and 75mg at bedtime thereafter 90 tablet 1    vitamin D3 (CHOLECALCIFEROL) 50 mcg (2000 units) tablet Take 3 tablets (150 mcg) by mouth daily 180 tablet 3    acetaminophen (TYLENOL) 325 MG tablet Take 325-650 mg by mouth      albuterol (PROAIR HFA/PROVENTIL HFA/VENTOLIN HFA) 108 (90 Base) MCG/ACT inhaler Inhale 2 puffs into the lungs      amLODIPine (NORVASC) 5 MG tablet Take 5 mg by mouth      aspirin (ASA) 81 MG chewable tablet Take 1 tablet by mouth daily      blood glucose ([employer] BLOOD GLUCOSE TEST) test strip 1 each      buPROPion (WELLBUTRIN SR) 150 MG 12 hr tablet Take 150 mg by mouth      Cholecalciferol (VITAMIN D-3) 125 MCG (5000 UT) TABS Take 1 capsule by mouth daily 30 tablet 11    cholecalciferol 50 MCG (2000 UT) CAPS Take 2,000 Units by mouth      cyclobenzaprine (FLEXERIL) 5 MG tablet Take 5 mg by mouth      hydrOXYzine (VISTARIL) 50 MG capsule Take 50 mg by mouth      ibuprofen (ADVIL/MOTRIN) 600 MG tablet Take 600 mg by mouth      metFORMIN (GLUCOPHAGE) 1000 MG tablet Take 1,000 mg by mouth      naproxen (NAPROSYN) 500 MG tablet Take 500 mg by mouth      NIFEdipine ER (ADALAT CC) 30 MG 24 hr tablet Take 1 tablet by mouth daily      Nutritional Supplements (HIGH-PROTEIN NUTRITIONAL SHAKE) LIQD Take 1 Bottle  "by mouth daily 7200 mL 11    polyethylene glycol (MIRALAX) 17 GM/Dose powder Take 17 g by mouth      potassium chloride ER (KLOR-CON M) 20 MEQ CR tablet Take 1 tablet by mouth daily      senna (SENOKOT) 8.6 MG tablet Take 1 tablet by mouth daily              No data to display                Lab on 06/29/2023   Component Date Value Ref Range Status    Vitamin D, Total (25-Hydroxy) 06/29/2023 17 (L)  20 - 75 ug/L Final    Parathyroid Hormone Intact 06/29/2023 58  15 - 65 pg/mL Final    Vitamin A 06/29/2023 0.45  0.30 - 1.20 mg/L Final    Retinol Palmitate 06/29/2023 <0.02  0.00 - 0.10 mg/L Final    Vitamin A Interp 06/29/2023 Normal   Final      This test was developed and its performance characteristics   determined by [facility]. It has not been cleared or   approved by the US Food and Drug Administration. This test   was performed in a CLIA certified laboratory and is   intended for clinical purposes.  Performed By: [facility]  [address] [place] [id number]  : Buck Martinez MD, PhD    Cholesterol 06/29/2023 188  <200 mg/dL Final    Triglycerides 06/29/2023 124  <150 mg/dL Final    Direct Measure HDL 06/29/2023 71  >=50 mg/dL Final    LDL Cholesterol Calculated 06/29/2023 92  <=100 mg/dL Final    Non HDL Cholesterol 06/29/2023 117  <130 mg/dL Final    TSH 06/29/2023 0.77  0.30 - 4.20 uIU/mL Final       PHYSICAL EXAM:  Objective   Ht 1.676 m (5' 5.98\")   Wt 117.9 kg (260 lb)   BMI 41.99 kg/m      Vitals - Patient Reported  Pain Score: Severe Pain (7)  Pain Loc: Knee        GENERAL: Healthy, alert and no distress  EYES: Eyes grossly normal to inspection.  No discharge or erythema, or obvious scleral/conjunctival abnormalities.  RESP: No audible wheeze, cough, or visible cyanosis.  No visible retractions or increased work of breathing.    SKIN: Visible skin clear. No significant rash, abnormal pigmentation or lesions.  NEURO: Cranial nerves grossly intact.  " Mentation and speech appropriate for age.  PSYCH: Mentation appears normal, affect normal/bright, judgement and insight intact, normal speech and appearance well-groomed.        Sincerely,    Renetta Arora PA-C      30 minutes spent by me on the date of the encounter doing chart review, history and exam, documentation and further activities per the note  Virtual Visit Details    Type of service:  Video Visit   Video Start Time:  135  Video End Time:1:59 PM    Originating Location (pt. Location): Home  Distant Location (provider location):  Off-site  Platform used for Video Visit: [employer]

## 2023-07-27 NOTE — NURSING NOTE
Is the patient currently in the state of [place]? YES    Visit mode:VIDEO    If the visit is dropped, the patient can be reconnected by: VIDEO VISIT: Text to cell phone: [phone]    Will anyone else be joining the visit? NO      How would you like to obtain your AVS? MyChart    Are changes needed to the allergy or medication list? NO    Reason for visit: Follow Up        
no

## 2023-07-27 NOTE — PROGRESS NOTES
Return Medical Weight Management Note     César Son  MRN:  [id number]  :  1985  CRISSY:  2023    Dear CRISS SPENCE CNP,    I had the pleasure of seeing your patient César Son. She is a 37 year old female who I am continuing to see for treatment of obesity related to:        2023     9:58 AM   --   I have the following health issues associated with obesity Pre-Diabetes    High Blood Pressure    Asthma       Assessment & Plan   Problem List Items Addressed This Visit          Endocrine Diagnoses    Class 3 severe obesity due to excess calories with serious comorbidity and body mass index (BMI) of 40.0 to 44.9 in adult (H)    Relevant Medications    topiramate (TOPAMAX) 25 MG tablet      Weight mgmt follow up  Plan for sleeve gastrectomy with Dr Slade  Working on psych eval and preop weight loss.  Labs done 23, low vitamin D    Anti obesity medication plan: Restart topiramate ramp to 75mg. No hx of kidney stones. Tubal ligation. Prescribed in April but pt doesn't recall starting it.  Vit D deficiency: Start D3 6000 international unit(s) daily.  Need to lost 18 lbs from today's weight to reach preop goal weight  Complete psych eval and set up new therapist as already scheduled.  Does not need to see a psychiatrist unless recommended by psych eval or therapist  See Dr Slade to discuss sleeve after psych eval    Follow up 3 months return MW Renetta        INTERVAL HISTORY:      Still planning for surgery. Working on tasklist  -psych eval 10/3 Meme Catherine  -new therapist Dr Blunt 24  -PCP manages bupropion and vistaril for depression/anxiety    Low vitamin D 17, increased D3 recently to 6000 international unit(s) daily    Consult wt: 252 and goal weight 242 lbs. Today 260 lbs.     Anti-obesity medications:     Current: None    Failed/contraindicated:   Topiramate prescribed 2022 but she can't recall if she started it or not.    Avoid GLP1 hx of pancreatitis      CURRENT  WEIGHT:   260 lbs 0 oz    Initial Weight (lbs): 252.6 lbs  Last Visits Weight: 117.9 kg (260 lb)  Cumulative weight loss (lbs): -7.4  Weight Loss Percentage: -2.93%         No data to display                  MEDICATIONS:   Current Outpatient Medications   Medication Sig Dispense Refill    topiramate (TOPAMAX) 25 MG tablet 25mg at bedtime for week 1, 50mg at bedtime for 1 week, and 75mg at bedtime thereafter 90 tablet 1    vitamin D3 (CHOLECALCIFEROL) 50 mcg (2000 units) tablet Take 3 tablets (150 mcg) by mouth daily 180 tablet 3    acetaminophen (TYLENOL) 325 MG tablet Take 325-650 mg by mouth      albuterol (PROAIR HFA/PROVENTIL HFA/VENTOLIN HFA) 108 (90 Base) MCG/ACT inhaler Inhale 2 puffs into the lungs      amLODIPine (NORVASC) 5 MG tablet Take 5 mg by mouth      aspirin (ASA) 81 MG chewable tablet Take 1 tablet by mouth daily      blood glucose ([employer]OGER BLOOD GLUCOSE TEST) test strip 1 each      buPROPion (WELLBUTRIN SR) 150 MG 12 hr tablet Take 150 mg by mouth      Cholecalciferol (VITAMIN D-3) 125 MCG (5000 UT) TABS Take 1 capsule by mouth daily 30 tablet 11    cholecalciferol 50 MCG (2000 UT) CAPS Take 2,000 Units by mouth      cyclobenzaprine (FLEXERIL) 5 MG tablet Take 5 mg by mouth      hydrOXYzine (VISTARIL) 50 MG capsule Take 50 mg by mouth      ibuprofen (ADVIL/MOTRIN) 600 MG tablet Take 600 mg by mouth      metFORMIN (GLUCOPHAGE) 1000 MG tablet Take 1,000 mg by mouth      naproxen (NAPROSYN) 500 MG tablet Take 500 mg by mouth      NIFEdipine ER (ADALAT CC) 30 MG 24 hr tablet Take 1 tablet by mouth daily      Nutritional Supplements (HIGH-PROTEIN NUTRITIONAL SHAKE) LIQD Take 1 Bottle by mouth daily 7200 mL 11    polyethylene glycol (MIRALAX) 17 GM/Dose powder Take 17 g by mouth      potassium chloride ER (KLOR-CON M) 20 MEQ CR tablet Take 1 tablet by mouth daily      senna (SENOKOT) 8.6 MG tablet Take 1 tablet by mouth daily              No data to display                Lab on 06/29/2023   Component  "Date Value Ref Range Status    Vitamin D, Total (25-Hydroxy) 06/29/2023 17 (L)  20 - 75 ug/L Final    Parathyroid Hormone Intact 06/29/2023 58  15 - 65 pg/mL Final    Vitamin A 06/29/2023 0.45  0.30 - 1.20 mg/L Final    Retinol Palmitate 06/29/2023 <0.02  0.00 - 0.10 mg/L Final    Vitamin A Interp 06/29/2023 Normal   Final      This test was developed and its performance characteristics   determined by [facility]. It has not been cleared or   approved by the US Food and Drug Administration. This test   was performed in a CLIA certified laboratory and is   intended for clinical purposes.  Performed By: [facility]  [address] [place] [id number]  : Buck Martinez MD, PhD    Cholesterol 06/29/2023 188  <200 mg/dL Final    Triglycerides 06/29/2023 124  <150 mg/dL Final    Direct Measure HDL 06/29/2023 71  >=50 mg/dL Final    LDL Cholesterol Calculated 06/29/2023 92  <=100 mg/dL Final    Non HDL Cholesterol 06/29/2023 117  <130 mg/dL Final    TSH 06/29/2023 0.77  0.30 - 4.20 uIU/mL Final       PHYSICAL EXAM:  Objective    Ht 1.676 m (5' 5.98\")   Wt 117.9 kg (260 lb)   BMI 41.99 kg/m      Vitals - Patient Reported  Pain Score: Severe Pain (7)  Pain Loc: Knee        GENERAL: Healthy, alert and no distress  EYES: Eyes grossly normal to inspection.  No discharge or erythema, or obvious scleral/conjunctival abnormalities.  RESP: No audible wheeze, cough, or visible cyanosis.  No visible retractions or increased work of breathing.    SKIN: Visible skin clear. No significant rash, abnormal pigmentation or lesions.  NEURO: Cranial nerves grossly intact.  Mentation and speech appropriate for age.  PSYCH: Mentation appears normal, affect normal/bright, judgement and insight intact, normal speech and appearance well-groomed.        Sincerely,    Renetta Arora PA-C      30 minutes spent by me on the date of the encounter doing chart review, history and exam, documentation " and further activities per the note  Virtual Visit Details    Type of service:  Video Visit   Video Start Time:  135  Video End Time:1:59 PM    Originating Location (pt. Location): Home  Distant Location (provider location):  Off-site  Platform used for Video Visit: [employer]

## 2023-08-05 ENCOUNTER — HEALTH MAINTENANCE LETTER (OUTPATIENT)
Age: 38
End: 2023-08-05

## 2023-08-11 ENCOUNTER — VIRTUAL VISIT (OUTPATIENT)
Dept: ENDOCRINOLOGY | Facility: CLINIC | Age: 38
End: 2023-08-11
Payer: COMMERCIAL

## 2023-08-11 VITALS — HEIGHT: 64 IN | BODY MASS INDEX: 43.36 KG/M2 | WEIGHT: 254 LBS

## 2023-08-11 DIAGNOSIS — E66.01 CLASS 3 SEVERE OBESITY DUE TO EXCESS CALORIES WITH SERIOUS COMORBIDITY AND BODY MASS INDEX (BMI) OF 40.0 TO 44.9 IN ADULT (H): ICD-10-CM

## 2023-08-11 DIAGNOSIS — E11.69 DIABETES MELLITUS TYPE 2 IN OBESE: ICD-10-CM

## 2023-08-11 DIAGNOSIS — E66.9 DIABETES MELLITUS TYPE 2 IN OBESE: ICD-10-CM

## 2023-08-11 DIAGNOSIS — E66.813 CLASS 3 SEVERE OBESITY DUE TO EXCESS CALORIES WITH SERIOUS COMORBIDITY AND BODY MASS INDEX (BMI) OF 40.0 TO 44.9 IN ADULT (H): ICD-10-CM

## 2023-08-11 DIAGNOSIS — Z71.3 NUTRITIONAL COUNSELING: Primary | ICD-10-CM

## 2023-08-11 PROCEDURE — 99207 PR NO CHARGE LOS: CPT | Mod: 95

## 2023-08-11 PROCEDURE — 97804 MEDICAL NUTRITION GROUP: CPT | Mod: 95

## 2023-08-11 ASSESSMENT — PAIN SCALES - GENERAL: PAINLEVEL: EXTREME PAIN (8)

## 2023-08-11 NOTE — NURSING NOTE
Is the patient currently in the state of [place]? YES    Visit mode:VIDEO    If the visit is dropped, the patient can be reconnected by: VIDEO VISIT: Text to cell phone: [phone]    Will anyone else be joining the visit? NO      How would you like to obtain your AVS? MyChart    Are changes needed to the allergy or medication list? NO    Reason for visit: Nurse Visit    SMITA Zaragoza on 8/11/2023 at 10:42 AM

## 2023-08-11 NOTE — PROGRESS NOTES
"Video-Visit Details    Type of service:  Video Visit    Video Start Time: 11:00 am   Video End Time: 11:54 am    Originating Location (pt. Location): Home    Distant Location (provider location):  Offsite (providers home)    Platform used for Video Visit: Other: Zoom    During this virtual visit the patient is located in [place], patient verifies this as the location during the entirety of this visit.     New Bariatric Nutrition Class Note    Reason For Visit: Nutrition Class     César Son is a 37 year old presenting today for virtual bariatric nutrition class and consultation.  Pt is interested in weight loss surgery.     Pt referred by Renetta Arora PA-C on July 27th, 2023.    CO-MORBIDITIES OF OBESITY INCLUDE:        4/5/2023     9:58 AM   --   I have the following health issues associated with obesity Pre-Diabetes    High Blood Pressure    Asthma       SUPPORT:      4/5/2023     9:58 AM   Support System Reviewed With Patient   Who do you have in your support network that can be available to help you for the first 2 weeks after surgery? Myself and myself and my kids   Who can you count on for support throughout your weight loss surgery journey? Myself and my kids       ANTHROPOMETRICS:    Estimated body mass index is 43.6 kg/m  as calculated from the following:    Height as of this encounter: 1.626 m (5' 4\").    Weight as of this encounter: 115.2 kg (254 lb).        4/5/2023     9:58 AM   --   I have tried the following methods to lose weight Watching portions or calories    Exercise    Atkins type diet (low carb/high protein)    Slimfast           4/5/2023     9:58 AM   Weight Loss Questions Reviewed With Patient   How long have you been overweight? Following one or more pregnancies         NUTRITION HISTORY:    Patient attended virtual WLS nutrition class today via Zoom.         4/5/2023     9:58 AM   Recall Diet Questions Reviewed With Patient   Describe what you typically consume for breakfast (typical or " most recent) Egg, portion shakes   Describe what you typically consume for lunch (typical or most recent) Noddles,it all depends   Describe what you typically consume for supper (typical or most recent) Whatever I decides to cook   How many ounces of water, or other low calorie drinks, do you drink daily (8 oz=1 glass)? 24 oz   How many ounces of caffeine (coffee, tea, pop) do you drink daily (8 oz=1 glass)? 8 oz   How many ounces of carbonated (pop, beer, sparkling water) drinks do you drinky daily (8 oz=1 glass)? 0 oz   How many ounces of juice, pop, sweet tea, sports drinks, protein drinks, other sweetened drinks, do you drink daily (8 oz=1 glass)? 8 oz   How many ounces of milk do you drink daily (8 oz=1 glass) 8 oz   Please indicate the type of milk 1%   How often do you drink alcohol? Monthly or less   If you do drink alcohol, how many drinks might you have in a day? (one drink = 5 oz. wine, 1 can/bottle of beer, 1 shot liquor) 1 or 2           4/5/2023     9:58 AM   Eating Habits   What foods do you crave? Sea food Chinese food           4/5/2023     9:58 AM   Dining Out History Reviewed With Patient   How often do you dine out? Rarely.   Where do you dine out? (select all that apply) sit-down restaurants   What types of food do you order when you dine out? Depends we're I go           4/5/2023     9:58 AM   Physical Activity Reviewed With Patient   How often do you exercise? 1 to 2 times per week   What is the duration of your exercise (in minutes)? 45 Minutes   What types of exercise do you do? walking   What keeps you from being more active? I am as active as I can possbily be    Pain       EXERCISE:        4/5/2023     9:58 AM   --   How often do you exercise? 1 to 2 times per week   What is the duration of your exercise (in minutes)? 45 Minutes   What types of exercise do you do? walking   What keeps you from being more active? I am as active as I can possbily be    Pain       NUTRITION  DIAGNOSIS:  Obesity r/t long history of positive energy balance aeb BMI >30 kg/m2.    INTERVENTION:  Patient attended New S Nutrition Class today.    Intervention Provided/Education Provided on post-op diet guidelines, vitamins/minerals essential post-operatively, GI anatomy of bariatric surgeries, ways to help prepare for post-op diet guidelines pre-operatively, portion/calorie-control, mindful eating and sources of protein. Provided pt with list of goals and handouts below via [employer]. Presentation slides provided via email.          4/5/2023     9:58 AM   Questions Reviewed With Patient   How ready are you to make changes regarding your weight? Number 1 = Not ready at all to make changes up to 10 = very ready. 10   How confident are you that you can change? 1 = Not confident that you will be successful making changes up to 10 = very confident. 1       Expected Engagement: good    GOALS:  Relating To Eating:  - Eat slowly (20-30 minutes per meal), chewing foods well (25 chews per bite/applesauce consistency)  - Focus on eating smaller portion sizes at meals and snacks    Relating to beverages:  - Reduce caffeine/carbonation/calorie containing beverages  - Separate fluids from meals by 30 minutes before, during, and after eating  - Drink 48-64 ounces of fluid per day. Small, frequent sips between meals.    Relating to activity:  Increase activity as able      Protein Sources for Weight Loss  [email or web address]     Carbohydrates  [email or web address]     Mindful Eating  [email or web address]     Diet Guidelines after Weight Loss Surgery  [email or web address]     Seated Exercises for Arms and Legs (can be done before or after surgery)  [email or web address]    Post-op Diet Handouts:  Diet Guidelines after Weight-loss Surgery  [email or web address]     Your Stage 1 Diet: Clear Liquids  [email or web address]     Your Stage 2 Diet: Low-fat Full  Liquids  [email or web address]     Your Stage 3 Diet: Pureed Foods  [email or web address]     Pureed Pleasures  [email or web address]    Your Stage 4 Diet: Soft Foods  [email or web address]    Your Stage 5 Diet: Regular Foods  [email or web address]    Supplements after Sleeve Gastrectomy, Gastric Bypass or Single Anastomosis Duodenal Switch  [email or web address]    Keeping Track of Fluids  [email or web address]      Follow up: Monthly until surgery recommended     Time spent with patient: 54 minutes.  SANIA Mueller, RD, LD

## 2023-08-11 NOTE — PATIENT INSTRUCTIONS
GOALS:  Relating To Eating:  - Eat slowly (20-30 minutes per meal), chewing foods well (25 chews per bite/applesauce consistency)  - Focus on eating smaller portion sizes at meals and snacks    Relating to beverages:  - Reduce caffeine/carbonation/calorie containing beverages  - Separate fluids from meals by 30 minutes before, during, and after eating  - Drink 48-64 ounces of fluid per day. Small, frequent sips between meals.    Relating to activity:  Increase activity as able      Protein Sources for Weight Loss  [email or web address]     Carbohydrates  [email or web address]     Mindful Eating  [email or web address]     Diet Guidelines after Weight Loss Surgery  [email or web address]     Seated Exercises for Arms and Legs (can be done before or after surgery)  [email or web address]    Post-op Diet Handouts:  Diet Guidelines after Weight-loss Surgery  [email or web address]     Your Stage 1 Diet: Clear Liquids  [email or web address]     Your Stage 2 Diet: Low-fat Full Liquids  [email or web address]     Your Stage 3 Diet: Pureed Foods  [email or web address]     Pureed Pleasures  [email or web address]    Your Stage 4 Diet: Soft Foods  [email or web address]    Your Stage 5 Diet: Regular Foods  [email or web address]    Supplements after Sleeve Gastrectomy, Gastric Bypass or Single Anastomosis Duodenal Switch  [email or web address]    Keeping Track of Fluids  [email or web address]      Follow up: Monthly until surgery recommended     SANIA Jerome, RD, LD  Clinic #: [phone]

## 2023-09-15 ENCOUNTER — VIRTUAL VISIT (OUTPATIENT)
Dept: ENDOCRINOLOGY | Facility: CLINIC | Age: 38
End: 2023-09-15
Payer: COMMERCIAL

## 2023-09-15 VITALS — HEIGHT: 64 IN | BODY MASS INDEX: 41.83 KG/M2 | WEIGHT: 245 LBS

## 2023-09-15 DIAGNOSIS — E66.813 CLASS 3 SEVERE OBESITY DUE TO EXCESS CALORIES WITH SERIOUS COMORBIDITY AND BODY MASS INDEX (BMI) OF 40.0 TO 44.9 IN ADULT (H): ICD-10-CM

## 2023-09-15 DIAGNOSIS — Z71.3 NUTRITIONAL COUNSELING: Primary | ICD-10-CM

## 2023-09-15 DIAGNOSIS — E11.69 DIABETES MELLITUS TYPE 2 IN OBESE: ICD-10-CM

## 2023-09-15 DIAGNOSIS — E66.9 DIABETES MELLITUS TYPE 2 IN OBESE: ICD-10-CM

## 2023-09-15 DIAGNOSIS — E66.01 CLASS 3 SEVERE OBESITY DUE TO EXCESS CALORIES WITH SERIOUS COMORBIDITY AND BODY MASS INDEX (BMI) OF 40.0 TO 44.9 IN ADULT (H): ICD-10-CM

## 2023-09-15 PROCEDURE — 97803 MED NUTRITION INDIV SUBSEQ: CPT | Mod: VID | Performed by: DIETITIAN, REGISTERED

## 2023-09-15 PROCEDURE — 99207 PR NO CHARGE LOS: CPT | Mod: VID | Performed by: DIETITIAN, REGISTERED

## 2023-09-15 ASSESSMENT — PAIN SCALES - GENERAL: PAINLEVEL: NO PAIN (0)

## 2023-09-15 NOTE — PROGRESS NOTES
"Video-Visit Details    Type of service:  Video Visit    Video Start Time: 1:01 PM   Video End Time: 1:25 PM    Originating Location (pt. Location): Home    Distant Location (provider location):  Offsite (providers home) [facility] [place]     Platform used for Video Visit: AppFog    During this virtual visit the patient is located in [place], patient verifies this as the location during the entirety of this visit.       Bariatric Nutrition Consultation Note    Reason For Visit: Nutrition Assessment    César Son is a 36 year old presenting today for return bariatric nutrition consult.   Pt is interested in laparoscopic sleeve gastrectomy with Dr. Slade.  Patient is accompanied by self.  This is pt's 8th of 6 required nutrition visits prior to surgery.     Coordination note:   Needs to establish with therapy - appt with Dr. lBunt 1/2024, but she is going to see if she can get into [facility] sooner  PCP appt on 7/11/23, no letter or mention of support in note.   Bariatric psych eval scheduled 10/3 with Dr. Catherine    Pt referred by DILLON Vu on January 18, 2022.  Patient with Co-morbidities of obesity including:  Type II DM yes  Renal Failure no  Sleep apnea yes  Hypertension yes   Dyslipidemia no  Joint pain yes  Back pain yes  GERD no         4/5/2023     9:58 AM   Support System Reviewed With Patient   Who do you have in your support network that can be available to help you for the first 2 weeks after surgery? Myself and myself and my kids   Who can you count on for support throughout your weight loss surgery journey? Myself and my kids       ANTHROPOMETRICS:  Initial consult:  Estimated body mass index is 40.77 kg/m  as calculated from the following:    Height as of 12/27/21: 1.676 m (5' 6\").    Weight as of 12/27/21: 114.6 kg (252 lb 9.6 oz).    Current weight (pt reported):   Estimated body mass index is 42.03 kg/m  as calculated from the following:    Height as of " "this encounter: 1.626 m (5' 4.02\").    Weight as of this encounter: 111.1 kg (245 lb). (- 0 lbs over the last 2 months, -7 lbs from initial)      Required weight loss goal pre-op: 10 lbs from initial consult weight (goal weight 242 lbs or less before surgery)        4/5/2023     9:58 AM   --   I have tried the following methods to lose weight Watching portions or calories    Exercise    Atkins type diet (low carb/high protein)    Slimfast           4/5/2023     9:58 AM   Weight Loss Questions Reviewed With Patient   How long have you been overweight? Following one or more pregnancies       SUPPLEMENT INFORMATION:  Vitamin D    Baseline bariatric labs completed 6/29/23:  - Vitamin D 17 (L) - pt is taking vitamin D supplement for repletion.   - All other labs normal     NUTRITION HISTORY:  NKFA. Does not tolerate greasy foods, example pt provided - ground beef  Previous experiences with loss: diet gummies (just made her go to the bathroom more), increased exercise.   She has 6 kids, 3-17 years old. 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   Often portioning out more food than she finds she can eat.     RD visit 3/16/23 - Starting PT for knee and back. Reducing pop and increasing water or using sugar-free beverages. Feeling puckett sooner at meals, after a few bites. Less appetite overall.     RD visit 4/17/23 - Continues to do PT, but describes worsening back and leg pain. Making it difficult to work, get quality sleep and participate in PT consistently. Recently she was admitted for kidney infection and pancreatitis. Has poor appetite r/t pain. Notes BP has been really high lately. Has visit scheduled with PCP today.      Last RD visit 5/16/23 - Working on collect clearances for surgery. Mom was hospitalized this past month and has been very stressful for pt. She continues to have little appetite. She is using protein shakes as meal replacements intermittently. She is drinking quite a bit of juice, " but is staying away from pop.    7/13/23 - Mom passed away in June. Pt has been experiencing low appetite since mom passed. Not eating much. Working on eating earlier in the day. Making good progress on collecting clearances for surgery.     8/11/23 - Completed Weight Loss Surgery Nutrition Class.     Today - Continues to have lower appetite, however reports eating more fruit and veggies. Low potassium noted at ED visit on 8/24 for pancreatitis. Additionally advised low-fat diet for pancreatitis. Staying away from pop, doing better with this recently.     Diet Recall:  Breakfast: skip, sleeps through morning d/t depression  Lunch: skip or protein shake   Dinner: whatever she makes for the family.   Beverages: Water    Progress Towards Weight Loss:   1) Add protein shake during the day - Improved   2) Calorie-free hydration only. Drink 64+ oz/day - Met, continues     ADDITIONAL INFORMATION:        4/5/2023     9:58 AM   Physical Activity Reviewed With Patient   How often do you exercise? 1 to 2 times per week   What is the duration of your exercise (in minutes)? 45 Minutes   What types of exercise do you do? walking   What keeps you from being more active? I am as active as I can possbily be    Pain         NUTRITION DIAGNOSIS:  Obesity r/t long history of positive energy balance aeb BMI >30 kg/m2. - improving    INTERVENTION:  Intervention Provided/Education Provided:  Reviewed post-op diet guidelines, ways to help prepare for post-op diet guidelines pre-operatively, portion/calorie-control, mindful eating and sources of protein.   Discussed continuing to use meal replacement shakes to help meet nutrition needs with low appetite.   Provided low-fat diet ed for pancreatitis.   Reviewed surgery task list.   Patient demonstrates understanding.   Provided pt with list of goals via [employer].         4/5/2023     9:58 AM   Questions Reviewed With Patient   How ready are you to make changes regarding your weight? Number 1 =  Not ready at all to make changes up to 10 = very ready. 10   How confident are you that you can change? 1 = Not confident that you will be successful making changes up to 10 = very confident. 1       Expected Engagement: good     Follow-up: 10/31    GOALS:  1) Drink at least one protein shake daily.   2) Try sugar-free sports drink like Gatorade Zero (with protein)  3) Restart daily multivitamin   4) Include lean protein at dinner - Lean protein sources: chicken/turkey without skin, fish, pork tenderloin, beef sirloin, shellfish, eggs, beans, lentils, tofu, tempeh, low-fat yogurt/cottage cheese    Tips for Following a Low-Fat Diet:  [email or web address]     Diet Guidelines after Weight Loss Surgery  [email or web address]     High potassium foods:  Baked potato, with skin 1 medium, 925 mg  White beans, canned   cup, 595 mg  Avocado   fruit, 487 mg   Fish: halibut, tuna, cod, snapper 3 oz, 480 mg   Swiss chard   cup, cooked, 480 mg  Banana 1 medium, 425 mg  Spinach   cup cooked, 420 mg  Papaya 1 small, 391 mg  Milk: fat free, low fat, whole, buttermilk 1 cup (8 oz), 350-380 mg  [place] beans   cup, 353 mg  Artichoke, cooked 1 medium, 343 mg  Soy milk 1 cup (8 oz), 287 mg  Tomato or vegetable juice   cup (4 oz), 275 mg  Dates 5 pieces 270 Raisins   cup (2 oz), 270 mg  Potato, boiled   cup, 255 mg  [place] sprouts   cup, 250 mg  [place] 3 oz, 250 mg  Sunflower or pumpkin seeds 1 oz, 240 mg  Yogurt   cup (4 oz), 238 mg   Orange 1 fruit, 237 mg  Broccoli   cup, 230 mg   Cantaloupe   cup, 215 mg   Nuts: almonds, peanuts, hazelnuts, [place], cashew, mixed 1 oz, 200mg   Tuna fish, canned 3 oz, 200mg       Time spent with patient: 13 minutes.  Kanwal Hunter RD, LD

## 2023-09-15 NOTE — PATIENT INSTRUCTIONS
Darryn Lindsey,    Follow-up with RD on 10/31    Thank you,    Kanwal Hunter, RD, LD  If you would like to schedule or reschedule an appointment with the RD, please call [phone]    Nutrition Goals  1) Drink at least one protein shake daily.   2) Try sugar-free sports drink like Gatorade Zero (with protein)  3) Restart daily multivitamin   4) Include lean protein at dinner - Lean protein sources: chicken/turkey without skin, fish, pork tenderloin, beef sirloin, shellfish, eggs, beans, lentils, tofu, tempeh, low-fat yogurt/cottage cheese    Tips for Following a Low-Fat Diet:  [email or web address]     Diet Guidelines after Weight Loss Surgery  [email or web address]     High potassium foods:  Baked potato, with skin 1 medium, 925 mg  White beans, canned   cup, 595 mg  Avocado   fruit, 487 mg   Fish: halibut, tuna, cod, snapper 3 oz, 480 mg   Swiss chard   cup, cooked, 480 mg  Banana 1 medium, 425 mg  Spinach   cup cooked, 420 mg  Papaya 1 small, 391 mg  Milk: fat free, low fat, whole, buttermilk 1 cup (8 oz), 350-380 mg  [place] beans   cup, 353 mg  Artichoke, cooked 1 medium, 343 mg  Soy milk 1 cup (8 oz), 287 mg  Tomato or vegetable juice   cup (4 oz), 275 mg  Dates 5 pieces 270 Raisins   cup (2 oz), 270 mg  Potato, boiled   cup, 255 mg  [place] sprouts   cup, 250 mg  [place] 3 oz, 250 mg  Sunflower or pumpkin seeds 1 oz, 240 mg  Yogurt   cup (4 oz), 238 mg   Orange 1 fruit, 237 mg  Broccoli   cup, 230 mg   Cantaloupe   cup, 215 mg   Nuts: almonds, peanuts, hazelnuts, [place], cashew, mixed 1 oz, 200mg   Tuna fish, canned 3 oz, 200mg           COMPREHENSIVE WEIGHT MANAGEMENT PROGRAM  VIRTUAL SUPPORT GROUPS    For Support Group Information:      We offer support groups for patients who are working on weight loss and considering, preparing for or have had weight loss surgery.   There is no cost for this opportunity.  You are invited to attend the?Virtual Support Groups?provided by any of the following  "locations:    [facility] via Trendr Teams with Katy Garza RN  2.   [place] via Trendr Teams with Alhaji Blount, PhD, LP  3.   [place] via Trendr Teams with Jesica Banuelos RN  4.   [facility] via Trendr Teams with Jesica Granado [facility]    The following Support Group information can also be found on our website:  [email or web address]    [email or web address]    [facility] Weight Loss Surgery Support Group    [facility] Weight Loss Surgery Support Group  The support group is a patient-lead forum that meets monthly to share experiences, encouragement and education. It is open to those who have had weight loss surgery, are scheduled for surgery, or are considering surgery.   WHEN: This group meets on the 3rd Wednesday of each month from 5:00PM - 6:00PM virtually using Microsoft Teams.   FACILITATOR: Led by Katy Root RD, PENELOPE, RN, the program's Clinical Coordinator.   TO REGISTER: Please contact the clinic via [employer] or call the nurse line directly at [phone] to inform our staff that you would like an invite sent to you and to let us know the email you would like the invite sent to. Prior to the meeting, a link with directions on how to join the meeting will be sent to you.    2023 Meetings   April 19: Guest Speaker, Marquez Monterroso RD, LD, \"Maintaining Weight Loss after Bariatric Surgery\".  May 17: \"Let's Talk\" a time for the group to share.  June 21: \"Let's Talk\" a time for the group to share.  July 19  August 16  September 20  October 18  November 15  December 20    [facility] Support Groups    [facility] Bariatric Care Support Group?  This is open to all pre- and post- operative bariatric surgery patients as well as their support system.   WHEN: This group meets the 2nd Tuesday of " "each month from 6:30 PM - 8:00 PM virtually using Microsoft Teams.   FACILITATOR: Led by Alhaji Blount, Ph.D who is a Licensed Psychologist with the [facility] Comprehensive Weight Management Program.   TO REGISTER: Please send an email to Alhaji Blount, Ph.D.,  at?[email or web address]?if you would like an invitation to the group and to learn about using Microsoft Teams.    2023 Meetings  April 11: Guest speaker, Katie Palacios MD, Bariatrician, [facility] Management [facility], \"Injectable Weight Loss Medications\".  May 9  [place] 13  July 11  August 8  September 12  October 10  November 14  December 12    [facility] Post-Operative Bariatric Surgery Support Group  This is a support group for [facility] bariatric patients (and those external to [facility]) who have had bariatric surgery and are at least 3 months post-surgery.  WHEN: This support group meets the 4th Wednesday of the month from 11:00 AM - 12:00 PM virtually using Microsoft Teams.   FACILITATOR: Led by Certified Bariatric Nurse, Jesica Banuelos RN.   TO REGISTER: Please send an email to Jesica at [email or web address] if you would like an invitation to the group and to learn about using Microsoft Teams.    2023 Meetings  April 26  May 24  [place] 28  July 26  August 23  September 27  October 25  November 22  December 27    [facility]   Healthy Lifestyle Virtual Support Group    Healthy Lifestyle Virtual Support Group?  This is 60 minutes of small group guided discussion, support and resources. All are welcome who want a healthy lifestyle.  WHEN: This group meets monthly on a Friday from 12:30 PM - 1:30 PM virtually using Microsoft Teams.  This group will meet the first Friday of the month beginning In July  FACILITATOR: Led by National Board Certified Health and , Jesica Granado [facility].   TO REGISTER: Please send an email to Jesica [email or web address] " to receive monthly invites to the group or if you have any questions about having a health .  Prior to the meeting, a link with directions on how to join the meeting will be sent to you.    2023 Meetings  May 19: Let's Talk  June 9: Create Your Coaching Toolkit: Learn How to  Yourself  July 7: Let's Talk  August 4: Benefits of Fiber with ABBY Contreras  September 1: Show and Tell (share your aps, podcasts, recipes, hacks, books)  October 6 :Let's Talk  November 3: Introduction to Mindfulness   December 1: Let's Talk

## 2023-09-15 NOTE — LETTER
9/15/2023       RE: César Son  [address] [place] [address]     Dear Colleague,    Thank you for referring your patient, César Son, to the [facility] [place] at [facility]. Please see a copy of my visit note below.    Video-Visit Details    Type of service:  Video Visit    Video Start Time: 1:01 PM   Video End Time: 1:25 PM    Originating Location (pt. Location): Home    Distant Location (provider location):  Offsite (providers home) [facility] [place]     Platform used for Video Visit: [employer]    During this virtual visit the patient is located in [place], patient verifies this as the location during the entirety of this visit.       Bariatric Nutrition Consultation Note    Reason For Visit: Nutrition Assessment    César Son is a 36 year old presenting today for return bariatric nutrition consult.   Pt is interested in laparoscopic sleeve gastrectomy with Dr. Slade.  Patient is accompanied by self.  This is pt's 8th of 6 required nutrition visits prior to surgery.     Coordination note:   Needs to establish with therapy - appt with Dr. Blunt 1/2024, but she is going to see if she can get into [facility] sooner  PCP appt on 7/11/23, no letter or mention of support in note.   Bariatric psych eval scheduled 10/3 with Dr. Catherine    Pt referred by DILLON Vu on January 18, 2022.  Patient with Co-morbidities of obesity including:  Type II DM yes  Renal Failure no  Sleep apnea yes  Hypertension yes   Dyslipidemia no  Joint pain yes  Back pain yes  GERD no         4/5/2023     9:58 AM   Support System Reviewed With Patient   Who do you have in your support network that can be available to help you for the first 2 weeks after surgery? Myself and myself and my kids   Who can you count on for support throughout your weight loss surgery journey? Myself and my kids  "      ANTHROPOMETRICS:  Initial consult:  Estimated body mass index is 40.77 kg/m  as calculated from the following:    Height as of 12/27/21: 1.676 m (5' 6\").    Weight as of 12/27/21: 114.6 kg (252 lb 9.6 oz).    Current weight (pt reported):   Estimated body mass index is 42.03 kg/m  as calculated from the following:    Height as of this encounter: 1.626 m (5' 4.02\").    Weight as of this encounter: 111.1 kg (245 lb). (- 0 lbs over the last 2 months, -7 lbs from initial)      Required weight loss goal pre-op: 10 lbs from initial consult weight (goal weight 242 lbs or less before surgery)        4/5/2023     9:58 AM   --   I have tried the following methods to lose weight Watching portions or calories    Exercise    Atkins type diet (low carb/high protein)    Slimfast           4/5/2023     9:58 AM   Weight Loss Questions Reviewed With Patient   How long have you been overweight? Following one or more pregnancies       SUPPLEMENT INFORMATION:  Vitamin D    Baseline bariatric labs completed 6/29/23:  - Vitamin D 17 (L) - pt is taking vitamin D supplement for repletion.   - All other labs normal     NUTRITION HISTORY:  NKFA. Does not tolerate greasy foods, example pt provided - ground beef  Previous experiences with loss: diet gummies (just made her go to the bathroom more), increased exercise.   She has 6 kids, 3-17 years old. 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   Often portioning out more food than she finds she can eat.     RD visit 3/16/23 - Starting PT for knee and back. Reducing pop and increasing water or using sugar-free beverages. Feeling puckett sooner at meals, after a few bites. Less appetite overall.     RD visit 4/17/23 - Continues to do PT, but describes worsening back and leg pain. Making it difficult to work, get quality sleep and participate in PT consistently. Recently she was admitted for kidney infection and pancreatitis. Has poor appetite r/t pain. Notes BP has been " really high lately. Has visit scheduled with PCP today.      Last RD visit 5/16/23 - Working on collect clearances for surgery. Mom was hospitalized this past month and has been very stressful for pt. She continues to have little appetite. She is using protein shakes as meal replacements intermittently. She is drinking quite a bit of juice, but is staying away from pop.    7/13/23 - Mom passed away in June. Pt has been experiencing low appetite since mom passed. Not eating much. Working on eating earlier in the day. Making good progress on collecting clearances for surgery.     8/11/23 - Completed Weight Loss Surgery Nutrition Class.     Today - Continues to have lower appetite, however reports eating more fruit and veggies. Low potassium noted at ED visit on 8/24 for pancreatitis. Additionally advised low-fat diet for pancreatitis. Staying away from pop, doing better with this recently.     Diet Recall:  Breakfast: skip, sleeps through morning d/t depression  Lunch: skip or protein shake   Dinner: whatever she makes for the family.   Beverages: Water    Progress Towards Weight Loss:   1) Add protein shake during the day - Improved   2) Calorie-free hydration only. Drink 64+ oz/day - Met, continues     ADDITIONAL INFORMATION:        4/5/2023     9:58 AM   Physical Activity Reviewed With Patient   How often do you exercise? 1 to 2 times per week   What is the duration of your exercise (in minutes)? 45 Minutes   What types of exercise do you do? walking   What keeps you from being more active? I am as active as I can possbily be    Pain         NUTRITION DIAGNOSIS:  Obesity r/t long history of positive energy balance aeb BMI >30 kg/m2. - improving    INTERVENTION:  Intervention Provided/Education Provided:  Reviewed post-op diet guidelines, ways to help prepare for post-op diet guidelines pre-operatively, portion/calorie-control, mindful eating and sources of protein.   Discussed continuing to use meal replacement  shakes to help meet nutrition needs with low appetite.   Provided low-fat diet ed for pancreatitis.   Reviewed surgery task list.   Patient demonstrates understanding.   Provided pt with list of goals via [employer].         4/5/2023     9:58 AM   Questions Reviewed With Patient   How ready are you to make changes regarding your weight? Number 1 = Not ready at all to make changes up to 10 = very ready. 10   How confident are you that you can change? 1 = Not confident that you will be successful making changes up to 10 = very confident. 1       Expected Engagement: good     Follow-up: 10/31    GOALS:  1) Drink at least one protein shake daily.   2) Try sugar-free sports drink like Gatorade Zero (with protein)  3) Restart daily multivitamin   4) Include lean protein at dinner - Lean protein sources: chicken/turkey without skin, fish, pork tenderloin, beef sirloin, shellfish, eggs, beans, lentils, tofu, tempeh, low-fat yogurt/cottage cheese    Tips for Following a Low-Fat Diet:  [email or web address]     Diet Guidelines after Weight Loss Surgery  [email or web address]     High potassium foods:  Baked potato, with skin 1 medium, 925 mg  White beans, canned   cup, 595 mg  Avocado   fruit, 487 mg   Fish: halibut, tuna, cod, snapper 3 oz, 480 mg   Swiss chard   cup, cooked, 480 mg  Banana 1 medium, 425 mg  Spinach   cup cooked, 420 mg  Papaya 1 small, 391 mg  Milk: fat free, low fat, whole, buttermilk 1 cup (8 oz), 350-380 mg  [place] beans   cup, 353 mg  Artichoke, cooked 1 medium, 343 mg  Soy milk 1 cup (8 oz), 287 mg  Tomato or vegetable juice   cup (4 oz), 275 mg  Dates 5 pieces 270 Raisins   cup (2 oz), 270 mg  Potato, boiled   cup, 255 mg  [place] sprouts   cup, 250 mg  [place] 3 oz, 250 mg  Sunflower or pumpkin seeds 1 oz, 240 mg  Yogurt   cup (4 oz), 238 mg   Orange 1 fruit, 237 mg  Broccoli   cup, 230 mg   Cantaloupe   cup, 215 mg   Nuts: almonds, peanuts, hazelnuts, [place], cashew, mixed 1 oz, 200mg    Tuna fish, canned 3 oz, 200mg       Time spent with patient: 13 minutes.  Kanwal Hunter RD, LD

## 2023-09-15 NOTE — NURSING NOTE
Is the patient currently in the state of [place]? YES    Visit mode:VIDEO    If the visit is dropped, the patient can be reconnected by: VIDEO VISIT: Text to cell phone:   Telephone Information:   Mobile [phone]       Will anyone else be joining the visit? NO  (If patient encounters technical issues they should call [phone])    How would you like to obtain your AVS? MyChart    Are changes needed to the allergy or medication list? Pt stated no changes to allergies and Pt stated no med changes    Reason for visit: Follow Up    Lily ASENCIO

## 2023-10-03 ENCOUNTER — VIRTUAL VISIT (OUTPATIENT)
Dept: NEUROPSYCHOLOGY | Facility: CLINIC | Age: 38
End: 2023-10-03
Payer: COMMERCIAL

## 2023-10-03 DIAGNOSIS — F54 PSYCHOLOGICAL FACTORS AFFECTING MEDICAL CONDITION: ICD-10-CM

## 2023-10-03 DIAGNOSIS — E66.01 MORBID OBESITY (H): ICD-10-CM

## 2023-10-03 DIAGNOSIS — Z01.818 PREPROCEDURAL EXAMINATION: Primary | ICD-10-CM

## 2023-10-03 PROCEDURE — 96138 PSYCL/NRPSYC TECH 1ST: CPT | Mod: VID

## 2023-10-03 PROCEDURE — 96130 PSYCL TST EVAL PHYS/QHP 1ST: CPT | Mod: VID

## 2023-10-03 PROCEDURE — 90791 PSYCH DIAGNOSTIC EVALUATION: CPT | Mod: VID
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
## 2023-10-03 NOTE — NURSING NOTE
The patient was seen for neuropsychological evaluation via telehealth at the request of Renetta Arora PA-C for the purposes of diagnostic clarification and treatment planning. 27 minutes of video test administration and scoring were provided by this writer. Please see Dr. Meme Catherine's report for a full interpretation of the findings.    Video Start Time 1: 8:55  Video End Time 1: 8:57    Video Start Time 2: 9:47  Video End Time 2: 10:05    Michelle Rivas  Psychometrist
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
## 2023-10-17 ENCOUNTER — VIRTUAL VISIT (OUTPATIENT)
Dept: ENDOCRINOLOGY | Facility: CLINIC | Age: 38
End: 2023-10-17
Payer: COMMERCIAL

## 2023-10-17 VITALS — HEIGHT: 65 IN | BODY MASS INDEX: 40.77 KG/M2

## 2023-10-17 DIAGNOSIS — E66.813 CLASS 3 SEVERE OBESITY DUE TO EXCESS CALORIES WITH SERIOUS COMORBIDITY AND BODY MASS INDEX (BMI) OF 40.0 TO 44.9 IN ADULT (H): Primary | ICD-10-CM

## 2023-10-17 DIAGNOSIS — E66.01 CLASS 3 SEVERE OBESITY DUE TO EXCESS CALORIES WITH SERIOUS COMORBIDITY AND BODY MASS INDEX (BMI) OF 40.0 TO 44.9 IN ADULT (H): Primary | ICD-10-CM

## 2023-10-17 PROCEDURE — 99207 PR NO CHARGE NURSE ONLY: CPT | Mod: 95

## 2023-10-17 ASSESSMENT — PAIN SCALES - GENERAL: PAINLEVEL: NO PAIN (0)

## 2023-10-17 NOTE — PROGRESS NOTES
New Bariatric Patient Class    César Son attended the New Consult WLS class today.     Patient's current comorbidities include:  Patient Active Problem List   Diagnosis    Class 3 severe obesity due to excess calories with serious comorbidity and body mass index (BMI) of 40.0 to 44.9 in adult (H)    Bilateral low back pain    Carpal tunnel syndrome    Diabetes mellitus type 2 in obese (H)    History of gestational diabetes    Heartburn    HTN (hypertension)    Prediabetes    Pancreatitis    Migraine    Major depression, recurrent (H24)       Current Outpatient Medications   Medication Sig Dispense Refill    acetaminophen (TYLENOL) 325 MG tablet Take 325-650 mg by mouth      albuterol (PROAIR HFA/PROVENTIL HFA/VENTOLIN HFA) 108 (90 Base) MCG/ACT inhaler Inhale 2 puffs into the lungs      amLODIPine (NORVASC) 5 MG tablet Take 5 mg by mouth      aspirin (ASA) 81 MG chewable tablet Take 1 tablet by mouth daily      blood glucose ([facility] BLOOD GLUCOSE TEST) test strip 1 each      buPROPion (WELLBUTRIN SR) 150 MG 12 hr tablet Take 150 mg by mouth      Cholecalciferol (VITAMIN D-3) 125 MCG (5000 UT) TABS Take 1 capsule by mouth daily 30 tablet 11    cholecalciferol 50 MCG (2000 UT) CAPS Take 2,000 Units by mouth      cyclobenzaprine (FLEXERIL) 5 MG tablet Take 5 mg by mouth      hydrOXYzine (VISTARIL) 50 MG capsule Take 50 mg by mouth      ibuprofen (ADVIL/MOTRIN) 600 MG tablet Take 600 mg by mouth      metFORMIN (GLUCOPHAGE) 1000 MG tablet Take 1,000 mg by mouth      naproxen (NAPROSYN) 500 MG tablet Take 500 mg by mouth      NIFEdipine ER (ADALAT CC) 30 MG 24 hr tablet Take 1 tablet by mouth daily      Nutritional Supplements (HIGH-PROTEIN NUTRITIONAL SHAKE) LIQD Take 1 Bottle by mouth daily 7200 mL 11    polyethylene glycol (MIRALAX) 17 GM/Dose powder Take 17 g by mouth      potassium chloride ER (KLOR-CON M) 20 MEQ CR tablet Take 1 tablet by mouth daily      senna (SENOKOT) 8.6 MG tablet Take 1 tablet by mouth  daily      topiramate (TOPAMAX) 25 MG tablet 25mg at bedtime for week 1, 50mg at bedtime for 1 week, and 75mg at bedtime thereafter 90 tablet 1    vitamin D3 (CHOLECALCIFEROL) 50 mcg (2000 units) tablet Take 3 tablets (150 mcg) by mouth daily 180 tablet 3       The following items were reviewed and questions answered.  Goal weight  Labs  Psych clearance  Specialty Clearances  Task list  Preop diet and exercise changes  Postop diet requirements  Postop medication requirements  Postop exercise  Postop lifetime behavior and diet changes    Patient will continue to meet with ELIZA, Dietician and Surgeon as required preoperatively.    All questions answered.  Patient instructed to contact the office for any questions and to notify office of any updates/clearances completed.

## 2023-10-17 NOTE — NURSING NOTE
Is the patient currently in the state of [place]? YES    Visit mode:VIDEO    If the visit is dropped, the patient can be reconnected by: VIDEO VISIT: Text to cell phone:   Telephone Information:   Mobile [phone]       Will anyone else be joining the visit? NO  (If patient encounters technical issues they should call [phone]    How would you like to obtain your AVS? MyChart    Are changes needed to the allergy or medication list? No    Reason for visit: RECHECK    Lili ASENCIO

## 2023-10-19 NOTE — PATIENT INSTRUCTIONS
César Son,    Thank you for taking time to attend the New Bariatric class.  Below are items to be mindful of as you work through the process as you wait for your surgery date.    In-Clinic Weight:  If you have not had an official in-clinic weight, please call [phone] to schedule a nurse visit for an in-clinic weight.  If you live a distance from the Clinic and Surgery Center, you can have the weight done with your PCP.  On the day of your weight, weigh at home in the same clothes you will weigh in at the clinic.  That way we can get a better correlation of the scales.    Labs: if you have not had your initial labs done yet, please schedule an appointment with a [place] lab.  If you need to have your labs faxed to an outside lab, contact our office.    Clearances: work on getting scheduled for your Psych clearance and contact your other providers for other clearances/letters of support that are needed.  As you get your clearances, you are welcome to send a [employer] message and we will keep an eye out for the reports/letters.  Our fax is: [phone].  Electronic copies can always be uploaded into [employer] and sent as an attachment to a [employer] message.    Dietician visits: attend your dietician visits monthly.  Missed appointments can delay your surgery date.  All patients are required to attend monthly dietician visits until their surgery.    Nutrition/Intake Modification:  Decrease portion sizes.  Read labels and portion out food according to the portion on the container.    Take time to read the nutrition information for recipes.  You will be surprised where hidden carbohydrates can be.  Make sure you are getting in at least 60 grams of protein daily - roughly 20 grams per meal.  Decrease/eliminate carbs in the form of chips, crackers, pasta, rice, pancakes, waffles, bagels, and white potatoes.  Replace with healthy vegetables.  Increase water intake to at least 64 ounces each day.  Take at least 30  minutes to eat your meal.  Be mindful of your meal when eating.  Chew your food well.  Savor the flavors and textures and be mindful of your mood.  Switch to caffeine and carbonation free beverages.    Mental Health:  Take a look at your past and current relationship with food.  Work with a therapist/psychiatrist/counselor/psychologist to discuss your mental health as it relates to food and food intake.  Attend a support group.    Activity:  Get active!  It is important to be mobile after surgery.  It helps with weight loss, healthy muscles & bones, and decreases chances of blood clots.  Start slow and have small goals.  If you currently have mobility issues, start with chair-based exercises and work your way up to standing exercises.    8. Support Group Information  Please click the following link for Support Group Information and Times: [email or web address]    9.  Your Task List:    Bariatric Task List    Fax:  Please fax all paperwork to: [phone] -     Status:  Is patient a candidate for bariatric surgery?:    -     Cleared to schedule surgeon consult?:    - After psychological evaluation and letters of support from therapist and psychiatrist. bks   Status:  surgery evaluation in process -     Surgeon: Yany -     Tentative surgery month/year: TBD -        Insurance: Insurance:  [facility] -      Contact insurance to discuss coverage:   -       C[employer]: PCP Recommendation and Medical Clearance:    -     HP Referral:    -      Advanced beneficiary notification (ABN) for Medicare patients for RD visits   and surgery:   -      Weight history:   -     Other:    -        Patient Info: Initial Weight:  252 -     Date of Initial Weight/Height:  12/27/2021 -     Goal Weight (lbs):  242 -     Required Weight Loss:  10 -     Surgery Type:  sleeve gastrectomy -     Multidisciplinary Meeting:    -        Dietician Visits: Structured weight loss required by  "insurance?:  structured weight loss required -     Dietician Visit 1:  Completed - 1/20/22. [facility]   Dietician Visit 2:  Completed - 6/30/22 KB   Dietician Visit 3:  Completed - 8/5/22 KB   Dietician Visit 4:  Completed - 9/6/22 KB   Dietician Visit 5:  Completed - 3/16/23 KB   Dietician Visit 6:  Completed - 4/17/23 KB   Dietician Visit additional:  Needed - Monthly until surgery for weight loss and postop diet teaching. s   Clearance from dietician to see surgeon?:    -     Dietician Notes:  Call [phone] to schedule monthly dietitian visits. [facility];5/16/23, 7/13/23 appt. -        Psychological Evaluation: Psych eval:  Needed - 10/17/22, 10/24/22 (ask if there is a 3rd appointment- usually there is 3 appointments). [facility]   Therapist letter of support:  Needed -     Psychiatrist letter of support:    -     Establish care with therapist:  Needed -     Complete eating disorder evaluation:    -     Letter of clearance from therapist/eating disorder program:    -     Other:  establish with psychiatry - Seeing Carlo Guzman at [facility], last visit 7/7/22 - KB      Lab Work: Complete Blood Count:  Completed - 4/4/23 [facility]   Comprehensive Metabolic Panel:  Completed - 4/4/23, 3/24/23. [facility]   Vitamin D:  Needed - 17 (L) on 6/29/23, recheck in 2 months -KB   PTH:  Completed - WNL 6/29/23 - KB   Hgb A1c:  Completed - 5.9 on 4/4/23    Lipids: Completed - WNL 6/29/23 - KB    TSH ([facility], [facility], [place]): Completed - WNL 6/29/23 - KB     Ferritin:   -       Folate:   -       Testosterone, Total and Free:   -     Thiamine:   -     Vitamin A: Completed - WNL 6/29/23 KB   Vitamin B12:   -     Zinc:   -     C-peptide:   -     H. pylori:    -     MRSA (2 swabs, minimum 48 hours apart):   -     Nicotine Testing:    -     Recheck Vitamin D:   -     Other:    -        Consults/ Clearance Sleep Medicine:  Completed - Completed sleep eval. Mychart msg to pt 5/19/23 from Yumiko Denny M.D. \"it shows resolution of sleep apnea.\" " "  Cardiac:    -     Pain:   -     Dental:    -     Endocrine:    -     Gastroenterology:    -     Vascular Medicine:    -     Hematology:    -     Medical Weight Management: Completed -     Physical Therapy/Exercise:    -     Nephrology:    -     Neurology:    -     Pulmonology:    -     Rheumatology:    -     Other:    -     Other:    -     Other:    -        Testing: UGI:    -     EGD:    -     Sleep Study: Completed - Requested by Dr Hawkins. Done 5/18/23.bks   Other:   -     Other:    -        PCP: Establish care with PCP:  Completed -     Follow up with PCP:    -     PCP letter of support:  Needed - Visit with PCP 7/11/23 Dr. Haas, pending letter      Stopping Smoking/ Alcohol Use/Cannabis Use: Quit tobacco use (3 months smoke free)?:    -     Quit date:    -     Quit alcohol use: Completed - Data deleted   Quit date:   -     Other:   -     Quit date:   -        Patient Education:  Information Session:  Completed -     Attended New Consult Class?: Completed - 10/17/23   Given \"Making your decision\" handout?:  Yes -     Given \"A Roadmap to you Weight Loss Surgery\" handout?: Yes -     Given \"Epic Care Companion\" information?: Yes -     Attended support group?:    -     Support plan in place?:  Completed -     Research consents signed?:    -     Avoid NSAIDS/ Alternate Plan for Pain:   -        Final Tasks:  Before surgery online preop class:  Needed -     After surgery online class:  Needed -     Nurse visit for information:  Needed -     Weight Check:   -     History and Physical: Needed - Pre-Assessment Clinic for history and physical before surgery. bks   Final labs per clinic: Needed -     See MTM Pharmacist for medication review and plan for after surgery (if DM, transplant hx, greater than 10 meds):   -     Chest xray per clinic:   -     Electrocardiogram (ECG) per clinic:   -     Schedule postop appointments:   -     Other:   -   -        Notes: Change from Get Well Loop to the Weight Loss Surgery " Epic Care Companion    Dear César Son,    Effective in February 2023, please register for the Weight Loss Surgery Care Companion Pathway through the [employer] mobile noemi or via web browser after you receive an invite.   Information from this care journey can help you be more successful before and after surgery, for up to one year after your surgical procedure. Your Care Companion Pathway through [employer] can also help answer any questions you might have related to the surgery.    The Pathway has 3 Phases:  Phase 1 starts when you get your Tasklist after your initial consultation with us or when you decide to start preparing for weight loss surgery.  Phase 2 starts when your surgery is scheduled.  Phase 3 starts on the day of discharge from the hospital.    You will be started on Phase 2    A patient can only be connected to one Care Companion journey at any given time.   You can remove or re-enroll yourself from the Weight Loss Surgery Care Companion Pathway by contacting us at [phone].     Your Weight Loss Surgery Team  [facility]  Ph:[phone]     -             Please feel free to reach out if you have any questions.    Sincerely,    Alanis Sam, RN, BSN, CCRN  Comprehensive Weight Management & General Surgery  [phone]

## 2023-10-20 ENCOUNTER — TEAM CONFERENCE (OUTPATIENT)
Dept: ENDOCRINOLOGY | Facility: CLINIC | Age: 38
End: 2023-10-20
Payer: COMMERCIAL

## 2023-10-20 NOTE — TELEPHONE ENCOUNTER
"Discussed at the Bariatric Team Meeting on 10/12/23, Patient Review.  Surgeon:Dr Dhruv Slade  Dietitian: Kanwal Hunter RD  Initial wt: 252  Goal wt:242  Most recent wt: 245 on 9/15/23  OR date: on hold  Last visit(s): 10/3/23 Dr Catherine, psych eval.  Concerns:10/3/23 per Dr Catherine's note - Need to establish a mental health support system prior to proceeding with surgery. (Has 2024 schedule with Dr Blunt). Psychotherapy could focus on strategies to manage depressed mood and anxiety, establishing a support system, and addressing alcohol use. (Mother  in 2023, one of her supports),  She will need to refrain from alcohol use. (To refrain for 1 year and maintain being sober from 23).  If unable to do so, then she may benefit from a chemical dependency evaluation and treatment recommendations. Patient was in hospital for a month d/t pancreatitis, in the ED 4 times last year w pancreatitis.  Also noted: Admit to hospital with acute on chronic pancreatitis on 23, elevated phosphatadylethanol level of 381, levels above 20 = mod to hvy ETOH use.  Concern about drinking.      Plan: Reviewed Dr Catherine's recommendation with patient via phone conversation.  Informed her that we need to put her surgery on hold so she can:  1. Establish care with a mental health support system, has 24 appt with Dr Blunt to discuss strategies recommended by Dr Catherine, \"to manage depressed mood and anxiety, establishing a support system, and addressing alcohol use\"  2. To maintain sobriety for one year from last hospitalization (23).  The alcohol use can affect weight loss due to being empty calories and can increase risk of pancreatitis episodes.  3. Continue to work with medical weight management and dietitian for weight loss.    Patient not happy with plan.  Asked why she can't have surgery now due to her back and knee pain. Informed her that we need the recommendations followed and can re-evaluate next " summer about her having surgery.  She states she may take time off from this process if she can't have surgery.  Encouraged to resume monthly dietitian visits when she feels ready.  Patient hung up the phone.  .

## 2023-10-31 ENCOUNTER — VIRTUAL VISIT (OUTPATIENT)
Dept: ENDOCRINOLOGY | Facility: CLINIC | Age: 38
End: 2023-10-31
Payer: COMMERCIAL

## 2023-10-31 VITALS — BODY MASS INDEX: 42.49 KG/M2 | WEIGHT: 255 LBS | HEIGHT: 65 IN

## 2023-10-31 DIAGNOSIS — E11.69 DIABETES MELLITUS TYPE 2 IN OBESE: ICD-10-CM

## 2023-10-31 DIAGNOSIS — Z71.3 NUTRITIONAL COUNSELING: Primary | ICD-10-CM

## 2023-10-31 DIAGNOSIS — E66.01 CLASS 3 SEVERE OBESITY DUE TO EXCESS CALORIES WITH SERIOUS COMORBIDITY AND BODY MASS INDEX (BMI) OF 40.0 TO 44.9 IN ADULT (H): ICD-10-CM

## 2023-10-31 DIAGNOSIS — E66.813 CLASS 3 SEVERE OBESITY DUE TO EXCESS CALORIES WITH SERIOUS COMORBIDITY AND BODY MASS INDEX (BMI) OF 40.0 TO 44.9 IN ADULT (H): ICD-10-CM

## 2023-10-31 DIAGNOSIS — E66.9 DIABETES MELLITUS TYPE 2 IN OBESE: ICD-10-CM

## 2023-10-31 PROCEDURE — 97803 MED NUTRITION INDIV SUBSEQ: CPT | Mod: 95 | Performed by: DIETITIAN, REGISTERED

## 2023-10-31 PROCEDURE — 99207 PR NO CHARGE LOS: CPT | Mod: 95 | Performed by: DIETITIAN, REGISTERED

## 2023-10-31 ASSESSMENT — PATIENT HEALTH QUESTIONNAIRE - PHQ9: SUM OF ALL RESPONSES TO PHQ QUESTIONS 1-9: 9

## 2023-10-31 ASSESSMENT — PAIN SCALES - GENERAL: PAINLEVEL: MODERATE PAIN (5)

## 2023-10-31 NOTE — NURSING NOTE
Is the patient currently in the state of [place]? YES    Visit mode:VIDEO    If the visit is dropped, the patient can be reconnected by: VIDEO VISIT: Text to cell phone:   Telephone Information:   Mobile [phone]       Will anyone else be joining the visit? NO  (If patient encounters technical issues they should call [phone])    How would you like to obtain your AVS? MyChart    Are changes needed to the allergy or medication list? No    Reason for visit: RECHECK    Pt states 5/10 chronic back pain - whole back.    Rahul BUENOF

## 2023-10-31 NOTE — PROGRESS NOTES
"Video-Visit Details    Type of service:  Video Visit    Video Start Time: 1:30 PM   Video End Time: 1:51 PM    Originating Location (pt. Location): Home    Distant Location (provider location):  Offsite (providers home) [facility] [place]     Platform used for Video Visit: [employer]    During this virtual visit the patient is located in [place], patient verifies this as the location during the entirety of this visit.       Bariatric Nutrition Consultation Note    Reason For Visit: Nutrition Assessment    César Son is a 36 year old presenting today for return bariatric nutrition consult.   Pt is interested in laparoscopic sleeve gastrectomy with Dr. Slade.  Patient is accompanied by self.  This is pt's 9th of 6 required nutrition visits prior to surgery.     Coordination note:   Needs to establish with therapy - appt with Dr. Blunt 1/2024    Pt referred by DILLON Vu on January 18, 2022.  Patient with Co-morbidities of obesity including:  Type II DM yes  Renal Failure no  Sleep apnea yes  Hypertension yes   Dyslipidemia no  Joint pain yes  Back pain yes  GERD no         4/5/2023     9:58 AM   Support System Reviewed With Patient   Who do you have in your support network that can be available to help you for the first 2 weeks after surgery? Myself and myself and my kids   Who can you count on for support throughout your weight loss surgery journey? Myself and my kids       ANTHROPOMETRICS:  Initial consult:  Estimated body mass index is 40.77 kg/m  as calculated from the following:    Height as of 12/27/21: 1.676 m (5' 6\").    Weight as of 12/27/21: 114.6 kg (252 lb 9.6 oz).    Current weight (pt reported):   Estimated body mass index is 42.43 kg/m  as calculated from the following:    Height as of this encounter: 1.651 m (5' 5\").    Weight as of this encounter: 115.7 kg (255 lb). (- 0 lbs over the last 2 months, -7 lbs from initial)      Required weight loss goal pre-op: " 10 lbs from initial consult weight (goal weight 242 lbs or less before surgery)        4/5/2023     9:58 AM   --   I have tried the following methods to lose weight Watching portions or calories    Exercise    Atkins type diet (low carb/high protein)    Slimfast           4/5/2023     9:58 AM   Weight Loss Questions Reviewed With Patient   How long have you been overweight? Following one or more pregnancies       SUPPLEMENT INFORMATION:  Vitamin D    Baseline bariatric labs completed 6/29/23:  - Vitamin D 17 (L) - pt is taking vitamin D supplement for repletion.   - All other labs normal     NUTRITION HISTORY:  NKFA. Does not tolerate greasy foods, example pt provided - ground beef  Previous experiences with loss: diet gummies (just made her go to the bathroom more), increased exercise.   She has 6 kids, 3-17 years old. 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   Often portioning out more food than she finds she can eat.     RD visit 3/16/23 - Starting PT for knee and back. Reducing pop and increasing water or using sugar-free beverages. Feeling puckett sooner at meals, after a few bites. Less appetite overall.     RD visit 4/17/23 - Continues to do PT, but describes worsening back and leg pain. Making it difficult to work, get quality sleep and participate in PT consistently. Recently she was admitted for kidney infection and pancreatitis. Has poor appetite r/t pain. Notes BP has been really high lately. Has visit scheduled with PCP today.      Last RD visit 5/16/23 - Working on collect clearances for surgery. Mom was hospitalized this past month and has been very stressful for pt. She continues to have little appetite. She is using protein shakes as meal replacements intermittently. She is drinking quite a bit of juice, but is staying away from pop.    7/13/23 - Mom passed away in June. Pt has been experiencing low appetite since mom passed. Not eating much. Working on eating earlier in the day.  Making good progress on collecting clearances for surgery.     8/11/23 - Completed Weight Loss Surgery Nutrition Class.     9/15/23 - Continues to have lower appetite, however reports eating more fruit and veggies. Low potassium noted at ED visit on 8/24 for pancreatitis. Additionally advised low-fat diet for pancreatitis. Staying away from pop, doing better with this recently.     Today - Reports appetite has been ok. More baked food lately. Has noticed she gets puckett sooner.     Diet Recall:  Breakfast: fruit   Lunch: smaller amounts    Dinner: salads and fish   Beverages: Water    Progress Towards Weight Loss:   1) Drink at least one protein shake daily. - Not met  2) Try sugar-free sports drink like Gatorade Zero (with protein) - continues  3) Restart daily multivitamin - Not met  4) Include lean protein at dinner - Lean protein sources: chicken/turkey without skin, fish, pork tenderloin, beef sirloin, shellfish, eggs, beans, lentils, tofu, tempeh, low-fat yogurt/cottage cheese - Improving    ADDITIONAL INFORMATION:        4/5/2023     9:58 AM   Physical Activity Reviewed With Patient   How often do you exercise? 1 to 2 times per week   What is the duration of your exercise (in minutes)? 45 Minutes   What types of exercise do you do? walking   What keeps you from being more active? I am as active as I can possbily be    Pain         NUTRITION DIAGNOSIS:  Obesity r/t long history of positive energy balance aeb BMI >30 kg/m2. - improving    INTERVENTION:  Intervention Provided/Education Provided:  Reviewed post-op diet guidelines, ways to help prepare for post-op diet guidelines pre-operatively, portion/calorie-control, mindful eating and sources of protein.   Discussed continuing to use meal replacement shakes to help meet nutrition needs with low appetite.   Provided low-fat diet ed for pancreatitis.   Reviewed surgery task list.   Patient demonstrates understanding.   Provided pt with list of goals via  [facility].         4/5/2023     9:58 AM   Questions Reviewed With Patient   How ready are you to make changes regarding your weight? Number 1 = Not ready at all to make changes up to 10 = very ready. 10   How confident are you that you can change? 1 = Not confident that you will be successful making changes up to 10 = very confident. 1       Expected Engagement: good     Follow-up: 12/5/23    GOALS:  1) Eat 3 small meals daily.   2) Portion out half sized meals. Use a small plate.   3) Restart daily multivitamin   4) Include lean protein with each meal - Lean protein sources: chicken/turkey without skin, fish, pork tenderloin, beef sirloin, shellfish, eggs, beans, lentils, tofu, tempeh, low-fat yogurt/cottage cheese  5) Ask MD about injections for knee/back pain at upcoming appt so you can more comfortably participate in PT.     [facility] Psychologists:  Neal Blunt [phone]    Diet Guidelines after Weight Loss Surgery  [email or web address]     High potassium foods:  Baked potato, with skin 1 medium, 925 mg  White beans, canned   cup, 595 mg  Avocado   fruit, 487 mg   Fish: halibut, tuna, cod, snapper 3 oz, 480 mg   Swiss chard   cup, cooked, 480 mg  Banana 1 medium, 425 mg  Spinach   cup cooked, 420 mg  Papaya 1 small, 391 mg  Milk: fat free, low fat, whole, buttermilk 1 cup (8 oz), 350-380 mg  [place] beans   cup, 353 mg  Artichoke, cooked 1 medium, 343 mg  Soy milk 1 cup (8 oz), 287 mg  Tomato or vegetable juice   cup (4 oz), 275 mg  Dates 5 pieces 270 Raisins   cup (2 oz), 270 mg  Potato, boiled   cup, 255 mg  [place] sprouts   cup, 250 mg  Turkey 3 oz, 250 mg  Sunflower or pumpkin seeds 1 oz, 240 mg  Yogurt   cup (4 oz), 238 mg   Orange 1 fruit, 237 mg  Broccoli   cup, 230 mg   Cantaloupe   cup, 215 mg   Nuts: almonds, peanuts, hazelnuts, [place], cashew, mixed 1 oz, 200mg   Tuna fish, canned 3 oz, 200mg       Time spent with patient: 21 minutes.  Kanwal Hunter, RD, LD

## 2023-10-31 NOTE — LETTER
"10/31/2023       RE: César Son  [address] [place] [address]     Dear Colleague,    Thank you for referring your patient, César Son, to the [facility] [place] at [facility]. Please see a copy of my visit note below.    Video-Visit Details    Type of service:  Video Visit    Video Start Time: 1:30 PM   Video End Time: 1:51 PM    Originating Location (pt. Location): Home    Distant Location (provider location):  Offsite (providers home) [facility] [place]     Platform used for Video Visit: MovieSet    During this virtual visit the patient is located in [place], patient verifies this as the location during the entirety of this visit.       Bariatric Nutrition Consultation Note    Reason For Visit: Nutrition Assessment    César Son is a 36 year old presenting today for return bariatric nutrition consult.   Pt is interested in laparoscopic sleeve gastrectomy with Dr. Slade.  Patient is accompanied by self.  This is pt's 9th of 6 required nutrition visits prior to surgery.     Coordination note:   Needs to establish with therapy - appt with Dr. Blunt 1/2024    Pt referred by DILLON Vu on January 18, 2022.  Patient with Co-morbidities of obesity including:  Type II DM yes  Renal Failure no  Sleep apnea yes  Hypertension yes   Dyslipidemia no  Joint pain yes  Back pain yes  GERD no         4/5/2023     9:58 AM   Support System Reviewed With Patient   Who do you have in your support network that can be available to help you for the first 2 weeks after surgery? Myself and myself and my kids   Who can you count on for support throughout your weight loss surgery journey? Myself and my kids       ANTHROPOMETRICS:  Initial consult:  Estimated body mass index is 40.77 kg/m  as calculated from the following:    Height as of 12/27/21: 1.676 m (5' 6\").    Weight as of 12/27/21: " "114.6 kg (252 lb 9.6 oz).    Current weight (pt reported):   Estimated body mass index is 42.43 kg/m  as calculated from the following:    Height as of this encounter: 1.651 m (5' 5\").    Weight as of this encounter: 115.7 kg (255 lb). (- 0 lbs over the last 2 months, -7 lbs from initial)      Required weight loss goal pre-op: 10 lbs from initial consult weight (goal weight 242 lbs or less before surgery)        4/5/2023     9:58 AM   --   I have tried the following methods to lose weight Watching portions or calories    Exercise    Atkins type diet (low carb/high protein)    Slimfast           4/5/2023     9:58 AM   Weight Loss Questions Reviewed With Patient   How long have you been overweight? Following one or more pregnancies       SUPPLEMENT INFORMATION:  Vitamin D    Baseline bariatric labs completed 6/29/23:  - Vitamin D 17 (L) - pt is taking vitamin D supplement for repletion.   - All other labs normal     NUTRITION HISTORY:  NKFA. Does not tolerate greasy foods, example pt provided - ground beef  Previous experiences with loss: diet gummies (just made her go to the bathroom more), increased exercise.   She has 6 kids, 3-17 years old. 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   Often portioning out more food than she finds she can eat.     RD visit 3/16/23 - Starting PT for knee and back. Reducing pop and increasing water or using sugar-free beverages. Feeling puckett sooner at meals, after a few bites. Less appetite overall.     RD visit 4/17/23 - Continues to do PT, but describes worsening back and leg pain. Making it difficult to work, get quality sleep and participate in PT consistently. Recently she was admitted for kidney infection and pancreatitis. Has poor appetite r/t pain. Notes BP has been really high lately. Has visit scheduled with PCP today.      Last RD visit 5/16/23 - Working on collect clearances for surgery. Mom was hospitalized this past month and has been very stressful " for pt. She continues to have little appetite. She is using protein shakes as meal replacements intermittently. She is drinking quite a bit of juice, but is staying away from pop.    7/13/23 - Mom passed away in June. Pt has been experiencing low appetite since mom passed. Not eating much. Working on eating earlier in the day. Making good progress on collecting clearances for surgery.     8/11/23 - Completed Weight Loss Surgery Nutrition Class.     9/15/23 - Continues to have lower appetite, however reports eating more fruit and veggies. Low potassium noted at ED visit on 8/24 for pancreatitis. Additionally advised low-fat diet for pancreatitis. Staying away from pop, doing better with this recently.     Today - Reports appetite has been ok. More baked food lately. Has noticed she gets puckett sooner.     Diet Recall:  Breakfast: fruit   Lunch: smaller amounts    Dinner: salads and fish   Beverages: Water    Progress Towards Weight Loss:   1) Drink at least one protein shake daily. - Not met  2) Try sugar-free sports drink like Gatorade Zero (with protein) - continues  3) Restart daily multivitamin - Not met  4) Include lean protein at dinner - Lean protein sources: chicken/turkey without skin, fish, pork tenderloin, beef sirloin, shellfish, eggs, beans, lentils, tofu, tempeh, low-fat yogurt/cottage cheese - Improving    ADDITIONAL INFORMATION:        4/5/2023     9:58 AM   Physical Activity Reviewed With Patient   How often do you exercise? 1 to 2 times per week   What is the duration of your exercise (in minutes)? 45 Minutes   What types of exercise do you do? walking   What keeps you from being more active? I am as active as I can possbily be    Pain         NUTRITION DIAGNOSIS:  Obesity r/t long history of positive energy balance aeb BMI >30 kg/m2. - improving    INTERVENTION:  Intervention Provided/Education Provided:  Reviewed post-op diet guidelines, ways to help prepare for post-op diet guidelines  pre-operatively, portion/calorie-control, mindful eating and sources of protein.   Discussed continuing to use meal replacement shakes to help meet nutrition needs with low appetite.   Provided low-fat diet ed for pancreatitis.   Reviewed surgery task list.   Patient demonstrates understanding.   Provided pt with list of goals via [employer].         4/5/2023     9:58 AM   Questions Reviewed With Patient   How ready are you to make changes regarding your weight? Number 1 = Not ready at all to make changes up to 10 = very ready. 10   How confident are you that you can change? 1 = Not confident that you will be successful making changes up to 10 = very confident. 1       Expected Engagement: good     Follow-up: 12/5/23    GOALS:  1) Eat 3 small meals daily.   2) Portion out half sized meals. Use a small plate.   3) Restart daily multivitamin   4) Include lean protein with each meal - Lean protein sources: chicken/turkey without skin, fish, pork tenderloin, beef sirloin, shellfish, eggs, beans, lentils, tofu, tempeh, low-fat yogurt/cottage cheese  5) Ask MD about injections for knee/back pain at upcoming appt so you can more comfortably participate in PT.     [facility] Health Psychologists:  Neal Blunt [phone]    Diet Guidelines after Weight Loss Surgery  [email or web address]     High potassium foods:  Baked potato, with skin 1 medium, 925 mg  White beans, canned   cup, 595 mg  Avocado   fruit, 487 mg   Fish: halibut, tuna, cod, snapper 3 oz, 480 mg   Swiss chard   cup, cooked, 480 mg  Banana 1 medium, 425 mg  Spinach   cup cooked, 420 mg  Papaya 1 small, 391 mg  Milk: fat free, low fat, whole, buttermilk 1 cup (8 oz), 350-380 mg  [place] beans   cup, 353 mg  Artichoke, cooked 1 medium, 343 mg  Soy milk 1 cup (8 oz), 287 mg  Tomato or vegetable juice   cup (4 oz), 275 mg  Dates 5 pieces 270 Raisins   cup (2 oz), 270 mg  Potato, boiled   cup, 255 mg  [place] sprouts   cup, 250 mg  Turkey 3 oz,  250 mg  Sunflower or pumpkin seeds 1 oz, 240 mg  Yogurt   cup (4 oz), 238 mg   Orange 1 fruit, 237 mg  Broccoli   cup, 230 mg   Cantaloupe   cup, 215 mg   Nuts: almonds, peanuts, hazelnuts, [place], cashew, mixed 1 oz, 200mg   Tuna fish, canned 3 oz, 200mg       Time spent with patient: 21 minutes.  Kanwal Hunter RD, LD

## 2023-10-31 NOTE — PATIENT INSTRUCTIONS
Darryn Lindsey,    Follow-up with RD on 12/5    Thank you,    Kanwal Hunter, RD, LD  If you would like to schedule or reschedule an appointment with the RD, please call [phone]    Nutrition Goals  1) Eat 3 small meals daily.   2) Portion out half sized meals. Use a small plate.   3) Restart daily multivitamin   4) Include lean protein with each meal - Lean protein sources: chicken/turkey without skin, fish, pork tenderloin, beef sirloin, shellfish, eggs, beans, lentils, tofu, tempeh, low-fat yogurt/cottage cheese  5) Ask MD about injections for knee/back pain at upcoming appt so you can more comfortably participate in PT.     [facility] Health Psychologists:  Neal Blunt [phone]    Diet Guidelines after Weight Loss Surgery  [email or web address]     High potassium foods:  Baked potato, with skin 1 medium, 925 mg  White beans, canned   cup, 595 mg  Avocado   fruit, 487 mg   Fish: halibut, tuna, cod, snapper 3 oz, 480 mg   Swiss chard   cup, cooked, 480 mg  Banana 1 medium, 425 mg  Spinach   cup cooked, 420 mg  Papaya 1 small, 391 mg  Milk: fat free, low fat, whole, buttermilk 1 cup (8 oz), 350-380 mg  [place] beans   cup, 353 mg  Artichoke, cooked 1 medium, 343 mg  Soy milk 1 cup (8 oz), 287 mg  Tomato or vegetable juice   cup (4 oz), 275 mg  Dates 5 pieces 270 Raisins   cup (2 oz), 270 mg  Potato, boiled   cup, 255 mg  [place] sprouts   cup, 250 mg  Turkey 3 oz, 250 mg  Sunflower or pumpkin seeds 1 oz, 240 mg  Yogurt   cup (4 oz), 238 mg   Orange 1 fruit, 237 mg  Broccoli   cup, 230 mg   Cantaloupe   cup, 215 mg   Nuts: almonds, peanuts, hazelnuts, [place], cashew, mixed 1 oz, 200mg   Tuna fish, canned 3 oz, 200mg           COMPREHENSIVE WEIGHT MANAGEMENT PROGRAM  VIRTUAL SUPPORT GROUPS    For Support Group Information:      We offer support groups for patients who are working on weight loss and considering, preparing for or have had weight loss surgery.   There is no cost for this  "opportunity.  You are invited to attend the?Virtual Support Groups?provided by any of the following locations:    [facility] via Microsoft Teams with Katy Garza RN  2.   [place] via [employer] with Alhaji Blount, PhD, LP  3.   [place] via [employer] with Jesica Banuelos RN  4.   [facility] via [employer] Teams with Jesica Granado, [facility]    The following Support Group information can also be found on our website:  [email or web address]    [email or web address]      [facility] Weight Loss Surgery Support Group    [facility] Weight Loss Surgery Support Group  The support group is a patient-lead forum that meets monthly to share experiences, encouragement and education. It is open to those who have had weight loss surgery, are scheduled for surgery, or are considering surgery.   WHEN: This group meets on the 3rd Wednesday of each month from 5:00PM - 6:00PM virtually using Microsoft Teams.   FACILITATOR: Led by Katy Root RD, PENELOPE, RN, the program's Clinical Coordinator.   TO REGISTER: Please contact the clinic via [employer] or call the nurse line directly at [phone] to inform our staff that you would like an invite sent to you and to let us know the email you would like the invite sent to. Prior to the meeting, a link with directions on how to join the meeting will be sent to you.    2023 Meetings   April 19: Guest Speaker, Marquez Monterroso RD, LD, \"Maintaining Weight Loss after Bariatric Surgery\".  May 17: \"Let's Talk\" a time for the group to share.  June 21: \"Let's Talk\" a time for the group to share.  July 19  August 16  September 20  October 18  November 15  December 20    [facility] and [facility] Support Groups    Connections Bariatric Care Support Group?  This is open to all pre- and post- operative " "bariatric surgery patients as well as their support system.   WHEN: This group meets the 2nd Tuesday of each month from 6:30 PM - 8:00 PM virtually using Microsoft Teams.   FACILITATOR: Led by Alhaji Blount, Ph.D who is a Licensed Psychologist with the [facility] Comprehensive Weight Management Program.   TO REGISTER: Please send an email to Alhaji Blount, Ph.D., LP at?[email or web address]?if you would like an invitation to the group and to learn about using Microsoft Teams.    2023 Meetings  April 11: Guest speaker, Katie Palacios MD, Bariatrician, [facility] Comprehensive Weight Management Program, \"Injectable Weight Loss Medications\".  May 9  Kimberley 13  July 11  August 8  September 12  October 10  November 14  December 12    Connections Post-Operative Bariatric Surgery Support Group  This is a support group for [facility] bariatric patients (and those external to [facility]) who have had bariatric surgery and are at least 3 months post-surgery.  WHEN: This support group meets the 4th Wednesday of the month from 11:00 AM - 12:00 PM virtually using Microsoft Teams.   FACILITATOR: Led by Certified Bariatric Nurse, Jesica Banuelos RN.   TO REGISTER: Please send an email to Jesica at [email or web address] if you would like an invitation to the group and to learn about using Microsoft Teams.    2023 Meetings  April 26  May 24  Kimberley 28  July 26  August 23  September 27  October 25  November 22  December 27      [facility]   Healthy Lifestyle Group    Healthy Lifestyle Group?  This is a 60 minute virtual coaching group for those who want to lead a healthier lifestyle. Come together to set goals and overcome barriers in a supportive group environment. We will address the four pillars of health--nutrition, exercise, sleep and emotional well-being.  This group is highly recommended for those who are participating in the 24 week Healthy Lifestyle Plan and " "our Health Coaching sessions.    WHEN: This group meets the first Friday of the month, 12:30 PM - 1:30 PM online, via a zoom meeting.      FACILITATOR: Led by National Board Certified Health and , Jesica Granado [facility].     TO REGISTER: Please call the Call Center at [phone] to register. You will get an appointment to attend in [facility]. Fifteen minutes prior to the meeting, complete the e-check in and you will get the link to join the meeting.  There is no charge to attend this group and space is limited.       2023 and 2024 Meeting Topics and Dates:  November 3: Introduction to Mindfulness (Learn simple and effective mindfulness practices and how it can benefit you)  December 8: Let's Talk (guided discussion on our wins and challenges)  January 5: New Years Vision: Manifest your Best 2024! (Guided imagery,  journaling and discussion)  February 2: Let's Talk  March 1: 10 Percent Happier by Neftaly Betts (Book Bites; a guided discussion on the nuggets of wisdom from favorite wellness books; no need to read the book but highly encouraged)  April 5: Let's Talk  May 3: \"Essentialism; The Disciplined Pursuit of Less by Ashvin Weber (book bites discussion)  June 7: Let's Talk  July 5: NO MEETING, off for the 4th of July Holiday  August 2: The Blue Zones, Secrets for Living a Longer Life by Neftaly Serrano (book bites discussion)                         "

## 2023-12-04 NOTE — PROGRESS NOTES
"Video-Visit Details    Type of service:  Video Visit    Video Start Time: 7:58 AM  Video End Time: 8:18 AM  Originating Location (pt. Location): Home    Distant Location (provider location):  Offsite (providers home) [facility] [place]     Platform used for Video Visit: ScriptPad    During this virtual visit the patient is located in [place], patient verifies this as the location during the entirety of this visit.       Bariatric Nutrition Consultation Note    Reason For Visit: Nutrition Assessment    César Son is a 38 year old presenting today for return bariatric nutrition consult.   Pt is interested in laparoscopic sleeve gastrectomy with Dr. Slade.  Patient is accompanied by self.  This is pt's 10th of 6 required nutrition visits prior to surgery.     Coordination note:   Needs to establish with therapy - appt with Dr. Blunt 1/2024    Pt referred by DILLON Vu on January 18, 2022.  Patient with Co-morbidities of obesity including:  Type II DM yes  Renal Failure no  Sleep apnea yes  Hypertension yes   Dyslipidemia no  Joint pain yes  Back pain yes  GERD no         4/5/2023     9:58 AM   Support System Reviewed With Patient   Who do you have in your support network that can be available to help you for the first 2 weeks after surgery? Myself and myself and my kids   Who can you count on for support throughout your weight loss surgery journey? Myself and my kids       ANTHROPOMETRICS:  Initial consult:  Estimated body mass index is 40.77 kg/m  as calculated from the following:    Height as of 12/27/21: 1.676 m (5' 6\").    Weight as of 12/27/21: 114.6 kg (252 lb 9.6 oz).    Current weight (pt reported):   Estimated body mass index is 41.6 kg/m  as calculated from the following:    Height as of this encounter: 1.651 m (5' 5\").    Weight as of this encounter: 113.4 kg (250 lb). (- 5 lbs over the last month, -2 lbs from initial)      Required weight loss goal pre-op: 10 lbs " from initial consult weight (goal weight 242 lbs or less before surgery)        4/5/2023     9:58 AM   --   I have tried the following methods to lose weight Watching portions or calories    Exercise    Atkins type diet (low carb/high protein)    Slimfast           4/5/2023     9:58 AM   Weight Loss Questions Reviewed With Patient   How long have you been overweight? Following one or more pregnancies       SUPPLEMENT INFORMATION:  Vitamin D - 6000 units/daily. Due for recheck.     Baseline bariatric labs completed 6/29/23:  - Vitamin D 17 (L) - pt is taking vitamin D supplement for repletion.   - All other labs normal     NUTRITION HISTORY:  NKFA. Does not tolerate greasy foods, example pt provided - ground beef  Previous experiences with loss: diet gummies (just made her go to the bathroom more), increased exercise.   She has 6 kids, 3-17 years old. 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   Often portioning out more food than she finds she can eat.     RD visit 3/16/23 - Starting PT for knee and back.     RD visit 4/17/23 - Continues to do PT, but describes worsening back and leg pain. Making it difficult to work, get quality sleep and participate in PT consistently. Recently she was admitted for kidney infection and pancreatitis. Has poor appetite r/t pain. Notes BP has been really high lately. Has visit scheduled with PCP today.      Last RD visit 5/16/23 - Working on collect clearances for surgery. Mom was hospitalized this past month and has been very stressful for pt. She continues to have little appetite. She is using protein shakes as meal replacements intermittently. She is drinking quite a bit of juice, but is staying away from pop.    7/13/23 - Mom passed away in June. Pt has been experiencing low appetite since mom passed. Not eating much. Working on eating earlier in the day. Making good progress on collecting clearances for surgery.     8/11/23 - Completed Weight Loss Surgery  Nutrition Class.     9/15/23 - Continues to have lower appetite, however reports eating more fruit and veggies. Low potassium noted at ED visit on 8/24 for pancreatitis. Additionally advised low-fat diet for pancreatitis. Staying away from pop, doing better with this recently.     10/31/23 - Reports appetite has been ok. More baked food lately. Has noticed she gets puckett sooner.     Today - Dealing with some tooth pain. Will be starting a potassium supplement for hypokalemia. Will be adjusting BP meds, has been really high lately. Staying away from pop. Continues to work on balanced meal preparation, would like to eat more fish but cost has been too high lately. Using protein shake intermittently, has a couple different ones.     Diet Recall:  Breakfast: sibley, toast, hb eggs; eggs   Lunch: skip if at work.   Dinner: Stevenson pasta with veggies and chicken   Snacks: fruit (strawberries, grapes)   Beverages: Water, 1% milk, zero avinash sports drinks, Ensure Max from time to time.     Progress Towards Weight Loss:   1) Eat 3 small meals daily. - Improved  2) Portion out half sized meals. Use a small plate. - Endorses   3) Restart daily multivitamin - Met, continues   4) Include lean protein with each meal - Lean protein sources: chicken/turkey without skin, fish, pork tenderloin, beef sirloin, shellfish, eggs, beans, lentils, tofu, tempeh, low-fat yogurt/cottage cheese - Improved   5) Ask MD about injections for knee/back pain at upcoming appt so you can more comfortably participate in PT. - Needs to have an MRI done for knee and back.      ADDITIONAL INFORMATION:        4/5/2023     9:58 AM   Physical Activity Reviewed With Patient   How often do you exercise? 1 to 2 times per week   What is the duration of your exercise (in minutes)? 45 Minutes   What types of exercise do you do? walking   What keeps you from being more active? I am as active as I can possbily be    Pain         NUTRITION DIAGNOSIS:  Obesity r/t long  history of positive energy balance aeb BMI >30 kg/m2. - improving    INTERVENTION:  Intervention Provided/Education Provided:  Reviewed post-op diet guidelines, ways to help prepare for post-op diet guidelines pre-operatively, portion/calorie-control, mindful eating and sources of protein.   Discussed continuing to use meal replacement shakes to help meet nutrition needs with low appetite. Reviewed criteria for meal replacements.   Discussed high-potassium food sources.   Provided pt with resources for more affordable groceries.   Patient demonstrates understanding.   Provided pt with list of goals via [employer].         4/5/2023     9:58 AM   Questions Reviewed With Patient   How ready are you to make changes regarding your weight? Number 1 = Not ready at all to make changes up to 10 = very ready. 10   How confident are you that you can change? 1 = Not confident that you will be successful making changes up to 10 = very confident. 1       Expected Engagement: good     Follow-up: 12/5/23    GOALS:  1) Eat 3 small meals daily.   2) Check protein shake, should be less than 200 calories.  3) Include lean protein with each meal - Lean protein sources: chicken/turkey without skin, fish, pork tenderloin, beef sirloin, shellfish, eggs, beans, lentils, tofu, tempeh, low-fat yogurt/cottage cheese  4) Have vitamin D rechecked. Can be done at any [place] lab.       Diet Guidelines after Weight Loss Surgery  [email or web address]     High potassium foods:  Baked potato, with skin 1 medium, 925 mg  White beans, canned   cup, 595 mg  Avocado   fruit, 487 mg   Fish: halibut, tuna, cod, snapper 3 oz, 480 mg   Swiss chard   cup, cooked, 480 mg  Banana 1 medium, 425 mg  Spinach   cup cooked, 420 mg  Papaya 1 small, 391 mg  Milk: fat free, low fat, whole, buttermilk 1 cup (8 oz), 350-380 mg  [place] beans   cup, 353 mg  Artichoke, cooked 1 medium, 343 mg  Soy milk 1 cup (8 oz), 287 mg  Tomato or vegetable juice   cup (4 oz), 275  mg  Dates 5 pieces 270 Raisins   cup (2 oz), 270 mg  Potato, boiled   cup, 255 mg  [place] sprouts   cup, 250 mg  Turkey 3 oz, 250 mg  Sunflower or pumpkin seeds 1 oz, 240 mg  Yogurt   cup (4 oz), 238 mg   Orange 1 fruit, 237 mg  Broccoli   cup, 230 mg   Cantaloupe   cup, 215 mg   Nuts: almonds, peanuts, hazelnuts, [place], cashew, mixed 1 oz, 200mg   Tuna fish, canned 3 oz, 200mg       SpendSmart,Eat Smart  Recipe ideas that are suppose to be more affordable  Calculator that allows you to compare product prices   https://spendsmart.extension.[facility]     Fare For All:  Provides discounted groceries. Up to 40% off retail pricing. You can preorder and  or buy in person, depending on the site. Visit their website for list of 38 locations in [place].  https://[email or web address] Carrie Tingley Hospitalmn.[email or web address]/    [employer] Market  Get organic produce and sustainably sourced groceries delivered at up to 40% off grocery store prices.  [email or web address]      [facility]  Fresh Produce Distribution Events and Free Food Markets in [place].   [email or web address]/[email or web address]/        Time spent with patient: 20 minutes.  Kanwal Hunter RD, LD   78

## 2023-12-05 ENCOUNTER — VIRTUAL VISIT (OUTPATIENT)
Dept: ENDOCRINOLOGY | Facility: CLINIC | Age: 38
End: 2023-12-05
Payer: COMMERCIAL

## 2023-12-05 VITALS — WEIGHT: 250 LBS | BODY MASS INDEX: 41.65 KG/M2 | HEIGHT: 65 IN

## 2023-12-05 DIAGNOSIS — E66.813 CLASS 3 SEVERE OBESITY DUE TO EXCESS CALORIES WITH SERIOUS COMORBIDITY AND BODY MASS INDEX (BMI) OF 40.0 TO 44.9 IN ADULT (H): ICD-10-CM

## 2023-12-05 DIAGNOSIS — E11.69 DIABETES MELLITUS TYPE 2 IN OBESE: ICD-10-CM

## 2023-12-05 DIAGNOSIS — E66.9 DIABETES MELLITUS TYPE 2 IN OBESE: ICD-10-CM

## 2023-12-05 DIAGNOSIS — Z71.3 NUTRITIONAL COUNSELING: Primary | ICD-10-CM

## 2023-12-05 DIAGNOSIS — E66.01 CLASS 3 SEVERE OBESITY DUE TO EXCESS CALORIES WITH SERIOUS COMORBIDITY AND BODY MASS INDEX (BMI) OF 40.0 TO 44.9 IN ADULT (H): ICD-10-CM

## 2023-12-05 PROCEDURE — 97803 MED NUTRITION INDIV SUBSEQ: CPT | Mod: 95 | Performed by: DIETITIAN, REGISTERED

## 2023-12-05 PROCEDURE — 99207 PR NO CHARGE LOS: CPT | Mod: 95 | Performed by: DIETITIAN, REGISTERED

## 2023-12-05 ASSESSMENT — PAIN SCALES - GENERAL: PAINLEVEL: SEVERE PAIN (7)

## 2023-12-05 NOTE — LETTER
"12/5/2023       RE: César Son  [address] [place] [address]     Dear Colleague,    Thank you for referring your patient, César Son, to the [facility] [place] at [facility]. Please see a copy of my visit note below.    Video-Visit Details    Type of service:  Video Visit    Video Start Time: 7:58 AM  Video End Time: 8:18 AM  Originating Location (pt. Location): Home    Distant Location (provider location):  Offsite (providers home) [facility] [place]     Platform used for Video Visit: Dreamise    During this virtual visit the patient is located in [place], patient verifies this as the location during the entirety of this visit.       Bariatric Nutrition Consultation Note    Reason For Visit: Nutrition Assessment    César Son is a 38 year old presenting today for return bariatric nutrition consult.   Pt is interested in laparoscopic sleeve gastrectomy with Dr. Slade.  Patient is accompanied by self.  This is pt's 10th of 6 required nutrition visits prior to surgery.     Coordination note:   Needs to establish with therapy - appt with Dr. Blunt 1/2024    Pt referred by DILLON Vu on January 18, 2022.  Patient with Co-morbidities of obesity including:  Type II DM yes  Renal Failure no  Sleep apnea yes  Hypertension yes   Dyslipidemia no  Joint pain yes  Back pain yes  GERD no         4/5/2023     9:58 AM   Support System Reviewed With Patient   Who do you have in your support network that can be available to help you for the first 2 weeks after surgery? Myself and myself and my kids   Who can you count on for support throughout your weight loss surgery journey? Myself and my kids       ANTHROPOMETRICS:  Initial consult:  Estimated body mass index is 40.77 kg/m  as calculated from the following:    Height as of 12/27/21: 1.676 m (5' 6\").    Weight as of 12/27/21: 114.6 " "kg (252 lb 9.6 oz).    Current weight (pt reported):   Estimated body mass index is 41.6 kg/m  as calculated from the following:    Height as of this encounter: 1.651 m (5' 5\").    Weight as of this encounter: 113.4 kg (250 lb). (- 5 lbs over the last month, -2 lbs from initial)      Required weight loss goal pre-op: 10 lbs from initial consult weight (goal weight 242 lbs or less before surgery)        4/5/2023     9:58 AM   --   I have tried the following methods to lose weight Watching portions or calories    Exercise    Atkins type diet (low carb/high protein)    Slimfast           4/5/2023     9:58 AM   Weight Loss Questions Reviewed With Patient   How long have you been overweight? Following one or more pregnancies       SUPPLEMENT INFORMATION:  Vitamin D - 6000 units/daily. Due for recheck.     Baseline bariatric labs completed 6/29/23:  - Vitamin D 17 (L) - pt is taking vitamin D supplement for repletion.   - All other labs normal     NUTRITION HISTORY:  NKFA. Does not tolerate greasy foods, example pt provided - ground beef  Previous experiences with loss: diet gummies (just made her go to the bathroom more), increased exercise.   She has 6 kids, 3-17 years old. 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   Often portioning out more food than she finds she can eat.     RD visit 3/16/23 - Starting PT for knee and back.     RD visit 4/17/23 - Continues to do PT, but describes worsening back and leg pain. Making it difficult to work, get quality sleep and participate in PT consistently. Recently she was admitted for kidney infection and pancreatitis. Has poor appetite r/t pain. Notes BP has been really high lately. Has visit scheduled with PCP today.      Last RD visit 5/16/23 - Working on collect clearances for surgery. Mom was hospitalized this past month and has been very stressful for pt. She continues to have little appetite. She is using protein shakes as meal replacements intermittently. " She is drinking quite a bit of juice, but is staying away from pop.    7/13/23 - Mom passed away in June. Pt has been experiencing low appetite since mom passed. Not eating much. Working on eating earlier in the day. Making good progress on collecting clearances for surgery.     8/11/23 - Completed Weight Loss Surgery Nutrition Class.     9/15/23 - Continues to have lower appetite, however reports eating more fruit and veggies. Low potassium noted at ED visit on 8/24 for pancreatitis. Additionally advised low-fat diet for pancreatitis. Staying away from pop, doing better with this recently.     10/31/23 - Reports appetite has been ok. More baked food lately. Has noticed she gets puckett sooner.     Today - Dealing with some tooth pain. Will be starting a potassium supplement for hypokalemia. Will be adjusting BP meds, has been really high lately. Staying away from pop. Continues to work on balanced meal preparation, would like to eat more fish but cost has been too high lately. Using protein shake intermittently, has a couple different ones.     Diet Recall:  Breakfast: sibley, toast, hb eggs; eggs   Lunch: skip if at work.   Dinner: Stevenson pasta with veggies and chicken   Snacks: fruit (strawberries, grapes)   Beverages: Water, 1% milk, zero avinash sports drinks, Ensure Max from time to time.     Progress Towards Weight Loss:   1) Eat 3 small meals daily. - Improved  2) Portion out half sized meals. Use a small plate. - Endorses   3) Restart daily multivitamin - Met, continues   4) Include lean protein with each meal - Lean protein sources: chicken/turkey without skin, fish, pork tenderloin, beef sirloin, shellfish, eggs, beans, lentils, tofu, tempeh, low-fat yogurt/cottage cheese - Improved   5) Ask MD about injections for knee/back pain at upcoming appt so you can more comfortably participate in PT. - Needs to have an MRI done for knee and back.      ADDITIONAL INFORMATION:        4/5/2023     9:58 AM   Physical  Activity Reviewed With Patient   How often do you exercise? 1 to 2 times per week   What is the duration of your exercise (in minutes)? 45 Minutes   What types of exercise do you do? walking   What keeps you from being more active? I am as active as I can possbily be    Pain         NUTRITION DIAGNOSIS:  Obesity r/t long history of positive energy balance aeb BMI >30 kg/m2. - improving    INTERVENTION:  Intervention Provided/Education Provided:  Reviewed post-op diet guidelines, ways to help prepare for post-op diet guidelines pre-operatively, portion/calorie-control, mindful eating and sources of protein.   Discussed continuing to use meal replacement shakes to help meet nutrition needs with low appetite. Reviewed criteria for meal replacements.   Discussed high-potassium food sources.   Provided pt with resources for more affordable groceries.   Patient demonstrates understanding.   Provided pt with list of goals via [employer].         4/5/2023     9:58 AM   Questions Reviewed With Patient   How ready are you to make changes regarding your weight? Number 1 = Not ready at all to make changes up to 10 = very ready. 10   How confident are you that you can change? 1 = Not confident that you will be successful making changes up to 10 = very confident. 1       Expected Engagement: good     Follow-up: 12/5/23    GOALS:  1) Eat 3 small meals daily.   2) Check protein shake, should be less than 200 calories.  3) Include lean protein with each meal - Lean protein sources: chicken/turkey without skin, fish, pork tenderloin, beef sirloin, shellfish, eggs, beans, lentils, tofu, tempeh, low-fat yogurt/cottage cheese  4) Have vitamin D rechecked. Can be done at any [place] lab.       Diet Guidelines after Weight Loss Surgery  [email or web address]     High potassium foods:  Baked potato, with skin 1 medium, 925 mg  White beans, canned   cup, 595 mg  Avocado   fruit, 487 mg   Fish: halibut, tuna, cod, snapper 3 oz, 480 mg    Swiss chard   cup, cooked, 480 mg  Banana 1 medium, 425 mg  Spinach   cup cooked, 420 mg  Papaya 1 small, 391 mg  Milk: fat free, low fat, whole, buttermilk 1 cup (8 oz), 350-380 mg  [place] beans   cup, 353 mg  Artichoke, cooked 1 medium, 343 mg  Soy milk 1 cup (8 oz), 287 mg  Tomato or vegetable juice   cup (4 oz), 275 mg  Dates 5 pieces 270 Raisins   cup (2 oz), 270 mg  Potato, boiled   cup, 255 mg  [place] sprouts   cup, 250 mg  Tur[place] 3 oz, 250 mg  Sunflower or pumpkin seeds 1 oz, 240 mg  Yogurt   cup (4 oz), 238 mg   Orange 1 fruit, 237 mg  Broccoli   cup, 230 mg   Cantaloupe   cup, 215 mg   Nuts: almonds, peanuts, hazelnuts, [place], cashew, mixed 1 oz, 200mg   Tuna fish, canned 3 oz, 200mg       SpendSmart,Eat Smart  Recipe ideas that are suppose to be more affordable  Calculator that allows you to compare product prices   https://spendsmart.extension.[facility]     Fare For All:  Provides discounted groceries. Up to 40% off retail pricing. You can preorder and  or buy in person, depending on the site. Visit their website for list of 38 locations in [place].  [email or web address] [email or web address]    [employer] Market  Get organic produce and sustainably sourced groceries delivered at up to 40% off grocery store prices.  https://[email or web address]      [facility]  Fresh Produce Distribution Events and Free Food Markets in [place].   [email or web address]        Time spent with patient: 20 minutes.  Kanwal Hunter RD, LD

## 2023-12-05 NOTE — PATIENT INSTRUCTIONS
Darryn Lindsey,    Follow-up with RD on 1/11    Thank you,    Kanwal Hunter, RD, LD  If you would like to schedule or reschedule an appointment with the RD, please call [phone]    Nutrition Goals  1) Eat 3 small meals daily.   2) Check protein shake, should be less than 200 calories.  3) Include lean protein with each meal - Lean protein sources: chicken/turkey without skin, fish, pork tenderloin, beef sirloin, shellfish, eggs, beans, lentils, tofu, tempeh, low-fat yogurt/cottage cheese  4) Have vitamin D rechecked. Can be done at any [place] lab.       Diet Guidelines after Weight Loss Surgery  [email or web address]     High potassium foods:  Baked potato, with skin 1 medium, 925 mg  White beans, canned   cup, 595 mg  Avocado   fruit, 487 mg   Fish: halibut, tuna, cod, snapper 3 oz, 480 mg   Swiss chard   cup, cooked, 480 mg  Banana 1 medium, 425 mg  Spinach   cup cooked, 420 mg  Papaya 1 small, 391 mg  Milk: fat free, low fat, whole, buttermilk 1 cup (8 oz), 350-380 mg  [place] beans   cup, 353 mg  Artichoke, cooked 1 medium, 343 mg  Soy milk 1 cup (8 oz), 287 mg  Tomato or vegetable juice   cup (4 oz), 275 mg  Dates 5 pieces 270 Raisins   cup (2 oz), 270 mg  Potato, boiled   cup, 255 mg  Milner sprouts   cup, 250 mg  Turkey 3 oz, 250 mg  Sunflower or pumpkin seeds 1 oz, 240 mg  Yogurt   cup (4 oz), 238 mg   Orange 1 fruit, 237 mg  Broccoli   cup, 230 mg   Cantaloupe   cup, 215 mg   Nuts: almonds, peanuts, hazelnuts, [place], cashew, mixed 1 oz, 200mg   Tuna fish, canned 3 oz, 200mg       SpendSmart,Eat Smart  Recipe ideas that are suppose to be more affordable  Calculator that allows you to compare product prices   https://spendsmart.extension.[facility]     Fare For All:  Provides discounted groceries. Up to 40% off retail pricing. You can preorder and  or buy in person, depending on the site. Visit their website for list of 38 locations in [place].  https://fareforall.thefoodUNM Cancer Centermn.[email or web address]    Misfits  Market  Get organic produce and sustainably sourced groceries delivered at up to 40% off grocery store prices.  https://[email or web address].[employer]      [facility]  Fresh Produce Distribution Events and Free Food Markets in [place].   [email or web address]          COMPREHENSIVE WEIGHT MANAGEMENT PROGRAM  VIRTUAL SUPPORT GROUPS    For Support Group Information:      We offer support groups for patients who are working on weight loss and considering, preparing for or have had weight loss surgery.   There is no cost for this opportunity.  You are invited to attend the?Virtual Support Groups?provided by any of the following locations:    [facility] via Microsoft Teams with Katy Garza RN  2.   [place] via Gazoob with Alhaji Blount, PhD, LP  3.   [place] via Gazoob with Jesica Banuelos RN  4.   [facility] via [facility] Teams with Jesica Granado [facility]    The following Support Group information can also be found on our website:  [email or web address]/weight-loss-surgery-support-groups    [email or web address]://[email or web address]/treatments/weight-loss-and-weight-loss-surgery-support-groups      [facility] Weight Loss Surgery Support Group    [facility] Weight Loss Surgery Support Group  The support group is a patient-lead forum that meets monthly to share experiences, encouragement and education. It is open to those who have had weight loss surgery, are scheduled for surgery, or are considering surgery.   WHEN: This group meets on the 3rd Wednesday of each month from 5:00PM - 6:00PM virtually using Microsoft Teams.   FACILITATOR: Led by Katy Root, RD, LD, RN, the program's Clinical Coordinator.   TO REGISTER: Please contact the clinic via Myreks or call the nurse line directly at [phone] to inform our staff that you would like an invite sent to you and to let us know the email you would like the invite  "sent to. Prior to the meeting, a link with directions on how to join the meeting will be sent to you.    2023 Meetings April 19: Guest Speaker, Marquez Monterroso, ABBY, LD, \"Maintaining Weight Loss after Bariatric Surgery\".  May 17: \"Let's Talk\" a time for the group to share.  June 21: \"Let's Talk\" a time for the group to share.  July 19  August 16  September 20  October 18  November 15  December 20    [facility] Support Groups    Connections Bariatric Care Support Group?  This is open to all pre- and post- operative bariatric surgery patients as well as their support system.   WHEN: This group meets the 2nd Tuesday of each month from 6:30 PM - 8:00 PM virtually using Microsoft Teams.   FACILITATOR: Led by Alhaji Blount, Ph.D who is a Licensed Psychologist with the [facility] Comprehensive Weight Management Program.   TO REGISTER: Please send an email to Alhaji Blount, Ph.D., LP at?[email or web address]?if you would like an invitation to the group and to learn about using Microsoft Teams.    2023 Meetings April 11: Guest speaker, Katie Palacios MD, Bariatrician, [facility] Comprehensive Weight Management Program, \"Injectable Weight Loss Medications\".  May 9  [place] 13  July 11  August 8  September 12  October 10  November 14  December 12    Connections Post-Operative Bariatric Surgery Support Group  This is a support group for [facility] bariatric patients (and those external to [facility]) who have had bariatric surgery and are at least 3 months post-surgery.  WHEN: This support group meets the 4th Wednesday of the month from 11:00 AM - 12:00 PM virtually using Microsoft Teams.   FACILITATOR: Led by Certified Bariatric Nurse, Jesica Banuelos RN.   TO REGISTER: Please send an email to Jesica at [email or web address] if you would like an invitation to the group and to learn about using Microsoft Teams.    2023 Meetings April 26 May 24 June 28 July " "26 August 23 September 27 October 25 November 22 December 27      [facility]   Healthy Lifestyle Group    Healthy Lifestyle Group?  This is a 60 minute virtual coaching group for those who want to lead a healthier lifestyle. Come together to set goals and overcome barriers in a supportive group environment. We will address the four pillars of health--nutrition, exercise, sleep and emotional well-being.  This group is highly recommended for those who are participating in the 24 week Healthy Lifestyle Plan and our Health Coaching sessions.    WHEN: This group meets the first Friday of the month, 12:30 PM - 1:30 PM online, via a zoom meeting.      FACILITATOR: Led by National Board Certified Health and , Jesica Granado [facility].     TO REGISTER: Please call the Call Center at [phone] to register. You will get an appointment to attend in [place]. Fifteen minutes prior to the meeting, complete the e-check in and you will get the link to join the meeting.  There is no charge to attend this group and space is limited.       2023 and 2024 Meeting Topics and Dates:  November 3: Introduction to Mindfulness (Learn simple and effective mindfulness practices and how it can benefit you)  December 8: Let's Talk (guided discussion on our wins and challenges)  January 5: New Years Vision: Manifest your Best 2024! (Guided imagery,  journaling and discussion)  February 2: Let's Talk  March 1: 10 Percent Happier by Neftaly Betts (Book Bites; a guided discussion on the nuggets of wisdom from favorite wellness books; no need to read the book but highly encouraged)  April 5: Let's Talk  May 3: \"Essentialism; The Disciplined Pursuit of Less by Ashvin Weber (book bites discussion)  June 7: Let's Talk  July 5: NO MEETING, off for the 4th of July Holiday  August 2: The Blue Zones, Secrets for Living a Longer Life by Neftaly Serrano (book bites discussion)                         "

## 2023-12-21 ENCOUNTER — VIRTUAL VISIT (OUTPATIENT)
Dept: ENDOCRINOLOGY | Facility: CLINIC | Age: 38
End: 2023-12-21
Payer: COMMERCIAL

## 2023-12-21 VITALS — HEIGHT: 65 IN | WEIGHT: 240 LBS | BODY MASS INDEX: 39.99 KG/M2

## 2023-12-21 DIAGNOSIS — E66.01 CLASS 3 SEVERE OBESITY DUE TO EXCESS CALORIES WITH SERIOUS COMORBIDITY AND BODY MASS INDEX (BMI) OF 40.0 TO 44.9 IN ADULT (H): ICD-10-CM

## 2023-12-21 DIAGNOSIS — E66.813 CLASS 3 SEVERE OBESITY DUE TO EXCESS CALORIES WITH SERIOUS COMORBIDITY AND BODY MASS INDEX (BMI) OF 40.0 TO 44.9 IN ADULT (H): ICD-10-CM

## 2023-12-21 PROCEDURE — 99212 OFFICE O/P EST SF 10 MIN: CPT | Mod: VID | Performed by: PHYSICIAN ASSISTANT

## 2023-12-21 RX ORDER — TOPIRAMATE 25 MG/1
75 TABLET, FILM COATED ORAL DAILY
Qty: 90 TABLET | Refills: 3 | Status: SHIPPED | OUTPATIENT
Start: 2023-12-21 | End: 2024-09-26

## 2023-12-21 ASSESSMENT — PAIN SCALES - GENERAL: PAINLEVEL: SEVERE PAIN (7)

## 2023-12-21 NOTE — LETTER
2023       RE: César Son  [address] [place] [address]     Dear Colleague,    Thank you for referring your patient, César Son, to the [facility] WEIGHT MANAGEMENT [facility]INIC [place] [facility]. Please see a copy of my visit note below.    Return Medical Weight Management Note     César Son  MRN:  [id number]  :  1985  CRISSY:  2023    Dear CRISS SPENCE CNP,    I had the pleasure of seeing your patient César Son. She is a 38 year old female who I am continuing to see for treatment of obesity related to:        2023     9:58 AM   --   I have the following health issues associated with obesity Pre-Diabetes    High Blood Pressure    Asthma       Assessment & Plan  Problem List Items Addressed This Visit          Endocrine Diagnoses    Class 3 severe obesity due to excess calories with serious comorbidity and body mass index (BMI) of 40.0 to 44.9 in adult (H)    Relevant Medications    topiramate (TOPAMAX) 25 MG tablet      Weight mgmt follow up  Planning for weight loss surgery but not cleared by psych until 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 Tolerating  Congrats on sobriety since August, plan for possible bariatric surgery sometime after 2024    Continue with RD monthly  Follow up 3-4 months return [facility]      INTERVAL HISTORY:    Waiting to have surgery until 1 year ago   Last alcohol 2023      Anti-obesity medication history    Current:   Topiramate  Bupropion  Metformin      CURRENT WEIGHT:   240 lbs 0 oz    Initial Weight (lbs): 252.6 lbs  Last Visits Weight: 113.4 kg (250 lb)  Cumulative weight loss (lbs): 12.6  Weight Loss Percentage: 4.99%        2023     1:09 PM   Changes  and Difficulties   I have made the following changes to my diet since my last visit: Eating different and a set time nothing after 7 pm   With regards to my diet, I am still struggling with: Na   I have made the following changes to my activity/exercise since my last visit: Walking   With regards to my activity/exercise, I am still struggling with: Na         MEDICATIONS:   Current Outpatient Medications   Medication Sig Dispense Refill    acetaminophen (TYLENOL) 325 MG tablet Take 325-650 mg by mouth      albuterol (PROAIR HFA/PROVENTIL HFA/VENTOLIN HFA) 108 (90 Base) MCG/ACT inhaler Inhale 2 puffs into the lungs      amLODIPine (NORVASC) 5 MG tablet Take 5 mg by mouth      aspirin (ASA) 81 MG chewable tablet Take 1 tablet by mouth daily      blood glucose ([facility]ER BLOOD GLUCOSE TEST) test strip 1 each      buPROPion (WELLBUTRIN SR) 150 MG 12 hr tablet Take 150 mg by mouth      Cholecalciferol (VITAMIN D-3) 125 MCG (5000 UT) TABS Take 1 capsule by mouth daily 30 tablet 11    cyclobenzaprine (FLEXERIL) 5 MG tablet Take 5 mg by mouth      hydrOXYzine (VISTARIL) 50 MG capsule Take 50 mg by mouth      ibuprofen (ADVIL/MOTRIN) 600 MG tablet Take 600 mg by mouth      metFORMIN (GLUCOPHAGE) 1000 MG tablet Take 1,000 mg by mouth      naproxen (NAPROSYN) 500 MG tablet Take 500 mg by mouth      NIFEdipine ER (ADALAT CC) 30 MG 24 hr tablet Take 1 tablet by mouth daily      Nutritional Supplements (HIGH-PROTEIN NUTRITIONAL SHAKE) LIQD Take 1 Bottle by mouth daily 7200 mL 11    polyethylene glycol (MIRALAX) 17 GM/Dose powder Take 17 g by mouth      potassium chloride ER (KLOR-CON M) 20 MEQ CR tablet Take 1 tablet by mouth daily      senna (SENOKOT) 8.6 MG tablet Take 1 tablet by mouth daily      topiramate (TOPAMAX) 25 MG tablet Take 3 tablets (75 mg) by mouth daily 25mg at bedtime for week 1, 50mg at bedtime for 1 week, and 75mg at bedtime thereafter 90 tablet 3    vitamin D3 (CHOLECALCIFEROL) 50 mcg (2000 units) tablet Take 3  "tablets (150 mcg) by mouth daily 180 tablet 3    cholecalciferol 50 MCG (2000 UT) CAPS Take 2,000 Units by mouth (Patient not taking: Reported on 10/31/2023)             12/21/2023     1:09 PM   Weight Loss Medication History Reviewed With Patient   Which weight loss medications are you currently taking on a regular basis? None   Are you having any side effects from the weight loss medication that we have prescribed you? No       Lab on 06/29/2023   Component Date Value Ref Range Status    Vitamin D, Total (25-Hydroxy) 06/29/2023 17 (L)  20 - 75 ug/L Final    Parathyroid Hormone Intact 06/29/2023 58  15 - 65 pg/mL Final    Vitamin A 06/29/2023 0.45  0.30 - 1.20 mg/L Final    Retinol Palmitate 06/29/2023 <0.02  0.00 - 0.10 mg/L Final    Vitamin A Interp 06/29/2023 Normal   Final      This test was developed and its performance characteristics   determined by [facility]. It has not been cleared or   approved by the US Food and Drug Administration. This test   was performed in a CLIA certified laboratory and is   intended for clinical purposes.  Performed By: [facility]  [address] [place] [id number]  : Buck Martinez MD, PhD    Cholesterol 06/29/2023 188  <200 mg/dL Final    Triglycerides 06/29/2023 124  <150 mg/dL Final    Direct Measure HDL 06/29/2023 71  >=50 mg/dL Final    LDL Cholesterol Calculated 06/29/2023 92  <=100 mg/dL Final    Non HDL Cholesterol 06/29/2023 117  <130 mg/dL Final    TSH 06/29/2023 0.77  0.30 - 4.20 uIU/mL Final           PHYSICAL EXAM:  Objective   Ht 1.651 m (5' 5\")   Wt 108.9 kg (240 lb)   BMI 39.94 kg/m      Vitals - Patient Reported  Pain Score: Severe Pain (7)  Pain Loc: Lower Leg (and low back)        GENERAL: Healthy, alert and no distress  EYES: Eyes grossly normal to inspection.  No discharge or erythema, or obvious scleral/conjunctival abnormalities.  RESP: No audible wheeze, cough, or visible cyanosis.  No visible retractions or " increased work of breathing.    SKIN: Visible skin clear. No significant rash, abnormal pigmentation or lesions.  NEURO: Cranial nerves grossly intact.  Mentation and speech appropriate for age.  PSYCH: Mentation appears normal, affect normal/bright, judgement and insight intact, normal speech and appearance well-groomed.        Sincerely,    Renetta Arora PA-C      10 minutes spent by me on the date of the encounter doing chart review, history and exam, documentation and further activities per the note      Virtual Visit Details    Type of service:  Video Visit   Video Start Time:  130  Video End Time: 140    Originating Location (pt. Location): Home    Distant Location (provider location):  Off-site  Platform used for Video Visit: Alex

## 2023-12-21 NOTE — PROGRESS NOTES
Return Medical Weight Management Note     César Son  MRN:  [id number]  :  1985  CRISSY:  2023    Dear CRISS SPENCE CNP,    I had the pleasure of seeing your patient César Son. She is a 38 year old female who I am continuing to see for treatment of obesity related to:        2023     9:58 AM   --   I have the following health issues associated with obesity Pre-Diabetes    High Blood Pressure    Asthma       Assessment & Plan   Problem List Items Addressed This Visit          Endocrine Diagnoses    Class 3 severe obesity due to excess calories with serious comorbidity and body mass index (BMI) of 40.0 to 44.9 in adult (H)    Relevant Medications    topiramate (TOPAMAX) 25 MG tablet      Weight mgmt follow up  Planning for weight loss surgery but not cleared by psych until 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 Tolerating  Congrats on sobriety since August, plan for possible bariatric surgery sometime after 2024    Continue with RD monthly  Follow up 3-4 months return [facility] Renetta      INTERVAL HISTORY:    Waiting to have surgery until 1 year ago   Last alcohol 2023      Anti-obesity medication history    Current:   Topiramate  Bupropion  Metformin      CURRENT WEIGHT:   240 lbs 0 oz    Initial Weight (lbs): 252.6 lbs  Last Visits Weight: 113.4 kg (250 lb)  Cumulative weight loss (lbs): 12.6  Weight Loss Percentage: 4.99%        2023     1:09 PM   Changes and Difficulties   I have made the following changes to my diet since my last visit: Eating different and a set time nothing after 7 pm   With regards to my diet, I am still struggling with: Na   I have made the following changes to my activity/exercise since my last visit: Walking   With regards to my activity/exercise, I am  still struggling with: Na         MEDICATIONS:   Current Outpatient Medications   Medication Sig Dispense Refill    acetaminophen (TYLENOL) 325 MG tablet Take 325-650 mg by mouth      albuterol (PROAIR HFA/PROVENTIL HFA/VENTOLIN HFA) 108 (90 Base) MCG/ACT inhaler Inhale 2 puffs into the lungs      amLODIPine (NORVASC) 5 MG tablet Take 5 mg by mouth      aspirin (ASA) 81 MG chewable tablet Take 1 tablet by mouth daily      blood glucose (KROGER BLOOD GLUCOSE TEST) test strip 1 each      buPROPion (WELLBUTRIN SR) 150 MG 12 hr tablet Take 150 mg by mouth      Cholecalciferol (VITAMIN D-3) 125 MCG (5000 UT) TABS Take 1 capsule by mouth daily 30 tablet 11    cyclobenzaprine (FLEXERIL) 5 MG tablet Take 5 mg by mouth      hydrOXYzine (VISTARIL) 50 MG capsule Take 50 mg by mouth      ibuprofen (ADVIL/MOTRIN) 600 MG tablet Take 600 mg by mouth      metFORMIN (GLUCOPHAGE) 1000 MG tablet Take 1,000 mg by mouth      naproxen (NAPROSYN) 500 MG tablet Take 500 mg by mouth      NIFEdipine ER (ADALAT CC) 30 MG 24 hr tablet Take 1 tablet by mouth daily      Nutritional Supplements (HIGH-PROTEIN NUTRITIONAL SHAKE) LIQD Take 1 Bottle by mouth daily 7200 mL 11    polyethylene glycol (MIRALAX) 17 GM/Dose powder Take 17 g by mouth      potassium chloride ER (KLOR-CON M) 20 MEQ CR tablet Take 1 tablet by mouth daily      senna (SENOKOT) 8.6 MG tablet Take 1 tablet by mouth daily      topiramate (TOPAMAX) 25 MG tablet Take 3 tablets (75 mg) by mouth daily 25mg at bedtime for week 1, 50mg at bedtime for 1 week, and 75mg at bedtime thereafter 90 tablet 3    vitamin D3 (CHOLECALCIFEROL) 50 mcg (2000 units) tablet Take 3 tablets (150 mcg) by mouth daily 180 tablet 3    cholecalciferol 50 MCG (2000 UT) CAPS Take 2,000 Units by mouth (Patient not taking: Reported on 10/31/2023)             12/21/2023     1:09 PM   Weight Loss Medication History Reviewed With Patient   Which weight loss medications are you currently taking on a regular basis?  "None   Are you having any side effects from the weight loss medication that we have prescribed you? No       Lab on 06/29/2023   Component Date Value Ref Range Status    Vitamin D, Total (25-Hydroxy) 06/29/2023 17 (L)  20 - 75 ug/L Final    Parathyroid Hormone Intact 06/29/2023 58  15 - 65 pg/mL Final    Vitamin A 06/29/2023 0.45  0.30 - 1.20 mg/L Final    Retinol Palmitate 06/29/2023 <0.02  0.00 - 0.10 mg/L Final    Vitamin A Interp 06/29/2023 Normal   Final      This test was developed and its performance characteristics   determined by [facility]. It has not been cleared or   approved by the US Food and Drug Administration. This test   was performed in a CLIA certified laboratory and is   intended for clinical purposes.  Performed By: [facility]  [address] [place] [id number]  : Buck Martinez MD, PhD    Cholesterol 06/29/2023 188  <200 mg/dL Final    Triglycerides 06/29/2023 124  <150 mg/dL Final    Direct Measure HDL 06/29/2023 71  >=50 mg/dL Final    LDL Cholesterol Calculated 06/29/2023 92  <=100 mg/dL Final    Non HDL Cholesterol 06/29/2023 117  <130 mg/dL Final    TSH 06/29/2023 0.77  0.30 - 4.20 uIU/mL Final           PHYSICAL EXAM:  Objective    Ht 1.651 m (5' 5\")   Wt 108.9 kg (240 lb)   BMI 39.94 kg/m      Vitals - Patient Reported  Pain Score: Severe Pain (7)  Pain Loc: Lower Leg (and low back)        GENERAL: Healthy, alert and no distress  EYES: Eyes grossly normal to inspection.  No discharge or erythema, or obvious scleral/conjunctival abnormalities.  RESP: No audible wheeze, cough, or visible cyanosis.  No visible retractions or increased work of breathing.    SKIN: Visible skin clear. No significant rash, abnormal pigmentation or lesions.  NEURO: Cranial nerves grossly intact.  Mentation and speech appropriate for age.  PSYCH: Mentation appears normal, affect normal/bright, judgement and insight intact, normal speech and appearance " well-groomed.        Sincerely,    Renetta Arora PA-C      10 minutes spent by me on the date of the encounter doing chart review, history and exam, documentation and further activities per the note      Virtual Visit Details    Type of service:  Video Visit   Video Start Time:  130  Video End Time: 140    Originating Location (pt. Location): Home    Distant Location (provider location):  Off-site  Platform used for Video Visit: GlobeRanger

## 2023-12-21 NOTE — NURSING NOTE
Is the patient currently in the state of [place]? YES    Visit mode:VIDEO    If the visit is dropped, the patient can be reconnected by: VIDEO VISIT: Text to cell phone:   Telephone Information:   Mobile [phone]       Will anyone else be joining the visit? NO  (If patient encounters technical issues they should call [phone]    How would you like to obtain your AVS? MyChart    Are changes needed to the allergy or medication list? No, Pt stated no changes to allergies, and Pt stated no med changes    Reason for visit: RECHECK (Pre- surg)    Roseline ASENCIO

## 2023-12-23 ENCOUNTER — HEALTH MAINTENANCE LETTER (OUTPATIENT)
Age: 38
End: 2023-12-23

## 2024-01-11 ENCOUNTER — VIRTUAL VISIT (OUTPATIENT)
Dept: ENDOCRINOLOGY | Facility: CLINIC | Age: 39
End: 2024-01-11
Payer: COMMERCIAL

## 2024-01-11 VITALS — BODY MASS INDEX: 41.48 KG/M2 | HEIGHT: 65 IN | WEIGHT: 249 LBS

## 2024-01-11 DIAGNOSIS — Z71.3 NUTRITIONAL COUNSELING: Primary | ICD-10-CM

## 2024-01-11 DIAGNOSIS — E66.01 CLASS 3 SEVERE OBESITY DUE TO EXCESS CALORIES WITH SERIOUS COMORBIDITY AND BODY MASS INDEX (BMI) OF 40.0 TO 44.9 IN ADULT (H): ICD-10-CM

## 2024-01-11 DIAGNOSIS — E66.813 CLASS 3 SEVERE OBESITY DUE TO EXCESS CALORIES WITH SERIOUS COMORBIDITY AND BODY MASS INDEX (BMI) OF 40.0 TO 44.9 IN ADULT (H): ICD-10-CM

## 2024-01-11 DIAGNOSIS — E66.9 DIABETES MELLITUS TYPE 2 IN OBESE: ICD-10-CM

## 2024-01-11 DIAGNOSIS — E11.69 DIABETES MELLITUS TYPE 2 IN OBESE: ICD-10-CM

## 2024-01-11 PROCEDURE — 97803 MED NUTRITION INDIV SUBSEQ: CPT | Mod: 95 | Performed by: DIETITIAN, REGISTERED

## 2024-01-11 PROCEDURE — 99207 PR NO CHARGE LOS: CPT | Mod: 95 | Performed by: DIETITIAN, REGISTERED

## 2024-01-11 ASSESSMENT — PAIN SCALES - GENERAL: PAINLEVEL: EXTREME PAIN (8)

## 2024-01-11 NOTE — PROGRESS NOTES
"Video-Visit Details    Type of service:  Video Visit    Video Start Time: 12:18 PM   Video End Time: 12:31 PM  Originating Location (pt. Location): Home    Distant Location (provider location):  Offsite (providers home) [facility] [place]     Platform used for Video Visit: Vestorly    During this virtual visit the patient is located in [place], patient verifies this as the location during the entirety of this visit.       Bariatric Nutrition Consultation Note    Reason For Visit: Nutrition Assessment    César Son is a 38 year old presenting today for return bariatric nutrition consult.   Pt is interested in laparoscopic sleeve gastrectomy with Dr. Slade.  Patient is accompanied by self.  This is pt's 11th of 6 required nutrition visits prior to surgery.     Coordination note:   Needs to establish with therapy - appt with Dr. Blunt 1/2024  Can plan for surgery after 1 year sobriety (August 2024 or later)     Pt referred by DILLON Vu on January 18, 2022.  Patient with Co-morbidities of obesity including:  Type II DM yes  Renal Failure no  Sleep apnea yes  Hypertension yes   Dyslipidemia no  Joint pain yes  Back pain yes  GERD no         4/5/2023     9:58 AM   Support System Reviewed With Patient   Who do you have in your support network that can be available to help you for the first 2 weeks after surgery? Myself and myself and my kids   Who can you count on for support throughout your weight loss surgery journey? Myself and my kids       ANTHROPOMETRICS:  Initial consult:  Estimated body mass index is 40.77 kg/m  as calculated from the following:    Height as of 12/27/21: 1.676 m (5' 6\").    Weight as of 12/27/21: 114.6 kg (252 lb 9.6 oz).    Current weight (pt reported):   Estimated body mass index is 41.44 kg/m  as calculated from the following:    Height as of this encounter: 1.651 m (5' 5\").    Weight as of this encounter: 112.9 kg (249 lb).   MD appt today:     113 " kg (249 lb 3.2 oz)   (- 5 lbs over the last month, -2 lbs from initial)      Required weight loss goal pre-op: 10 lbs from initial consult weight (goal weight 242 lbs or less before surgery)        4/5/2023     9:58 AM   --   I have tried the following methods to lose weight Watching portions or calories    Exercise    Atkins type diet (low carb/high protein)    Slimfast           4/5/2023     9:58 AM   Weight Loss Questions Reviewed With Patient   How long have you been overweight? Following one or more pregnancies       SUPPLEMENT INFORMATION:  Vitamin D 2000 units/day  Multivitamin    Baseline bariatric labs completed 6/29/23:  - Vitamin D 17 (L) - pt previously taking vitamin D supplement for repletion.   - All other labs normal     NUTRITION HISTORY:  NKFA. Does not tolerate greasy foods, example pt provided - ground beef  Previous experiences with loss: diet gummies (just made her go to the bathroom more), increased exercise.   She has 6 kids, 3-17 years old. 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   Often portioning out more food than she finds she can eat.     RD visit 3/16/23 - Starting PT for knee and back.     RD visit 4/17/23 - Continues to do PT, but describes worsening back and leg pain. Making it difficult to work, get quality sleep and participate in PT consistently. Recently she was admitted for kidney infection and pancreatitis. Has poor appetite r/t pain. Notes BP has been really high lately. Has visit scheduled with PCP today.      Last RD visit 5/16/23 - Working on collect clearances for surgery. Mom was hospitalized this past month and has been very stressful for pt. She continues to have little appetite. She is using protein shakes as meal replacements intermittently. She is drinking quite a bit of juice, but is staying away from pop.    7/13/23 - Mom passed away in June. Pt has been experiencing low appetite since mom passed. Not eating much. Working on eating earlier in  the day. Making good progress on collecting clearances for surgery.     8/11/23 - Completed Weight Loss Surgery Nutrition Class.     9/15/23 - Continues to have lower appetite, however reports eating more fruit and veggies. Low potassium noted at ED visit on 8/24 for pancreatitis. Additionally advised low-fat diet for pancreatitis. Staying away from pop, doing better with this recently.     10/31/23 - Reports appetite has been ok. More baked food lately. Has noticed she gets puckett sooner.     12/5/23 - Dealing with some tooth pain. Will be starting a potassium supplement for hypokalemia. Will be adjusting BP meds, has been really high lately. Staying away from pop. Continues to work on balanced meal preparation, would like to eat more fish but cost has been too high lately. Using protein shake intermittently, has a couple different ones.     Today - Had visit with PCP today, weight checked in clinic. Reports having vitamin lab rechecked here, unable to see labs yet. Reports being told she was dehydrated. Vitamin D supplement reduced to 2000 units/day. Reports consuming 1-2 meals daily. Notes having higher fat foods like hamburgers that may have contributed to recent weight gain.     Diet Recall:  Breakfast: skipping   Lunch: 1-2 pm 2 pc chicken from [employer]   Dinner: slice of pizza at home   Beverages: Water, 1% milk, zero avinash sports drinks, Ensure Max from time to time.     Progress Towards Weight Loss:   1) Eat 3 small meals daily. - Not met, eating 1-2 meals daily   2) Check protein shake, should be less than 200 calories. - Met, continues   3) Include lean protein with each meal - Lean protein sources: chicken/turkey without skin, fish, pork tenderloin, beef sirloin, shellfish, eggs, beans, lentils, tofu, tempeh, low-fat yogurt/cottage cheese - Improved   4) Have vitamin D rechecked. Can be done at any [place] lab. - Met, continues     ADDITIONAL INFORMATION:        4/5/2023     9:58 AM   Physical Activity  Reviewed With Patient   How often do you exercise? 1 to 2 times per week   What is the duration of your exercise (in minutes)? 45 Minutes   What types of exercise do you do? walking   What keeps you from being more active? I am as active as I can possbily be    Pain         NUTRITION DIAGNOSIS:  Obesity r/t long history of positive energy balance aeb BMI >30 kg/m2. - improving    INTERVENTION:  Intervention Provided/Education Provided:  Reviewed post-op diet guidelines, ways to help prepare for post-op diet guidelines pre-operatively, portion/calorie-control, mindful eating and sources of protein.   Discussed continuing to use meal replacement shakes as needed to meet protein needs. Reviewed criteria for meal replacements.   Reviewed lean protein sources and low-fat food choices.    Provided pt with resources for more affordable groceries.   Patient demonstrates understanding.   Provided pt with list of goals via echoecho.         4/5/2023     9:58 AM   Questions Reviewed With Patient   How ready are you to make changes regarding your weight? Number 1 = Not ready at all to make changes up to 10 = very ready. 10   How confident are you that you can change? 1 = Not confident that you will be successful making changes up to 10 = very confident. 1       Expected Engagement: good     Follow-up: 12/5/23    GOALS:  1) Eat 3 small, high-protein meals daily.   2) Continue to use protein shake instead of skipping a meal  3) Increase fluid intake, aim to consume 64+ oz/day.     Diet Guidelines after Weight Loss Surgery  [email or web address]     High potassium foods:  Baked potato, with skin 1 medium, 925 mg  White beans, canned   cup, 595 mg  Avocado   fruit, 487 mg   Fish: halibut, tuna, cod, snapper 3 oz, 480 mg   Swiss chard   cup, cooked, 480 mg  Banana 1 medium, 425 mg  Spinach   cup cooked, 420 mg  Papaya 1 small, 391 mg  Milk: fat free, low fat, whole, buttermilk 1 cup (8 oz), 350-380 mg  Lima beans   cup, 353  mg  Artichoke, cooked 1 medium, 343 mg  Soy milk 1 cup (8 oz), 287 mg  Tomato or vegetable juice   cup (4 oz), 275 mg  Dates 5 pieces 270 Raisins   cup (2 oz), 270 mg  Potato, boiled   cup, 255 mg  [place] sprouts   cup, 250 mg  Turkey 3 oz, 250 mg  Sunflower or pumpkin seeds 1 oz, 240 mg  Yogurt   cup (4 oz), 238 mg   Orange 1 fruit, 237 mg  Broccoli   cup, 230 mg   Cantaloupe   cup, 215 mg   Nuts: almonds, peanuts, hazelnuts, [place], cashew, mixed 1 oz, 200mg   Tuna fish, canned 3 oz, 200mg       SpendSmart,Eat Smart  Recipe ideas that are suppose to be more affordable  Calculator that allows you to compare product prices   https://spendsmart.[email or web address].[facility]     Fare For All:  Provides discounted groceries. Up to 40% off retail pricing. You can preorder and  or buy in person, depending on the site. Visit their website for list of 38 locations in [place].  [email or web address]    [employer] Market  Get organic produce and sustainably sourced groceries delivered at up to 40% off grocery store prices.  https://[email or web address]      [facility]  Fresh Produce Distribution Events and Free Food Markets in [place].   [email or web address]/programs/food-[email or web address]/        Time spent with patient: 13 minutes.  Kanwal Hunter RD, LD

## 2024-01-11 NOTE — LETTER
1/11/2024       RE: César Son  [address] [place] [address]     Dear Colleague,    Thank you for referring your patient, César Son, to the [facility] [place] at [facility]. Please see a copy of my visit note below.    Video-Visit Details    Type of service:  Video Visit    Video Start Time: 12:18 PM   Video End Time:   Originating Location (pt. Location): Home    Distant Location (provider location):  Offsite (providers home) [facility] [place]     Platform used for Video Visit: SmallRivers    During this virtual visit the patient is located in [place], patient verifies this as the location during the entirety of this visit.       Bariatric Nutrition Consultation Note    Reason For Visit: Nutrition Assessment    César Son is a 38 year old presenting today for return bariatric nutrition consult.   Pt is interested in laparoscopic sleeve gastrectomy with Dr. Slade.  Patient is accompanied by self.  This is pt's 11th of 6 required nutrition visits prior to surgery.     Coordination note:   Needs to establish with therapy - appt with Dr. Blunt 1/2024  Can plan for surgery after 1 year sobriety (August 2024 or later)     Pt referred by DILLON Vu on January 18, 2022.  Patient with Co-morbidities of obesity including:  Type II DM yes  Renal Failure no  Sleep apnea yes  Hypertension yes   Dyslipidemia no  Joint pain yes  Back pain yes  GERD no         4/5/2023     9:58 AM   Support System Reviewed With Patient   Who do you have in your support network that can be available to help you for the first 2 weeks after surgery? Myself and myself and my kids   Who can you count on for support throughout your weight loss surgery journey? Myself and my kids       ANTHROPOMETRICS:  Initial consult:  Estimated body mass index is 40.77 kg/m  as calculated from the following:    Height as  "of 12/27/21: 1.676 m (5' 6\").    Weight as of 12/27/21: 114.6 kg (252 lb 9.6 oz).    Current weight (pt reported):   Estimated body mass index is 41.44 kg/m  as calculated from the following:    Height as of this encounter: 1.651 m (5' 5\").    Weight as of this encounter: 112.9 kg (249 lb).   MD appt today:     113 kg (249 lb 3.2 oz)   (- 5 lbs over the last month, -2 lbs from initial)      Required weight loss goal pre-op: 10 lbs from initial consult weight (goal weight 242 lbs or less before surgery)        4/5/2023     9:58 AM   --   I have tried the following methods to lose weight Watching portions or calories    Exercise    Atkins type diet (low carb/high protein)    Slimfast           4/5/2023     9:58 AM   Weight Loss Questions Reviewed With Patient   How long have you been overweight? Following one or more pregnancies       SUPPLEMENT INFORMATION:  Vitamin D 2000 units/day  Multivitamin    Baseline bariatric labs completed 6/29/23:  - Vitamin D 17 (L) - pt previously taking vitamin D supplement for repletion.   - All other labs normal     NUTRITION HISTORY:  NKFA. Does not tolerate greasy foods, example pt provided - ground beef  Previous experiences with loss: diet gummies (just made her go to the bathroom more), increased exercise.   She has 6 kids, 3-17 years old. 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   Often portioning out more food than she finds she can eat.     RD visit 3/16/23 - Starting PT for knee and back.     RD visit 4/17/23 - Continues to do PT, but describes worsening back and leg pain. Making it difficult to work, get quality sleep and participate in PT consistently. Recently she was admitted for kidney infection and pancreatitis. Has poor appetite r/t pain. Notes BP has been really high lately. Has visit scheduled with PCP today.      Last RD visit 5/16/23 - Working on collect clearances for surgery. Mom was hospitalized this past month and has been very stressful for " pt. She continues to have little appetite. She is using protein shakes as meal replacements intermittently. She is drinking quite a bit of juice, but is staying away from pop.    7/13/23 - Mom passed away in June. Pt has been experiencing low appetite since mom passed. Not eating much. Working on eating earlier in the day. Making good progress on collecting clearances for surgery.     8/11/23 - Completed Weight Loss Surgery Nutrition Class.     9/15/23 - Continues to have lower appetite, however reports eating more fruit and veggies. Low potassium noted at ED visit on 8/24 for pancreatitis. Additionally advised low-fat diet for pancreatitis. Staying away from pop, doing better with this recently.     10/31/23 - Reports appetite has been ok. More baked food lately. Has noticed she gets puckett sooner.     12/5/23 - Dealing with some tooth pain. Will be starting a potassium supplement for hypokalemia. Will be adjusting BP meds, has been really high lately. Staying away from pop. Continues to work on balanced meal preparation, would like to eat more fish but cost has been too high lately. Using protein shake intermittently, has a couple different ones.     Today - Had visit with PCP today, weight checked in clinic. Reports having vitamin lab rechecked here, unable to see labs yet. Reports being told she was dehydrated. Vitamin D supplement reduced to 2000 units/day. Reports consuming 1-2 meals daily. Notes having higher fat foods like hamburgers that may have contributed to recent weight gain.     Diet Recall:  Breakfast: skipping   Lunch: 1-2 pm 2 pc chicken from [employer]   Dinner: slice of pizza at home   Beverages: Water, 1% milk, zero avinash sports drinks, Ensure Max from time to time.     Progress Towards Weight Loss:   1) Eat 3 small meals daily. - Not met, eating 1-2 meals daily   2) Check protein shake, should be less than 200 calories. - Met, continues   3) Include lean protein with each meal - Lean protein sources:  chicken/turkey without skin, fish, pork tenderloin, beef sirloin, shellfish, eggs, beans, lentils, tofu, tempeh, low-fat yogurt/cottage cheese - Improved   4) Have vitamin D rechecked. Can be done at any [place] lab. - Met, continues     ADDITIONAL INFORMATION:        4/5/2023     9:58 AM   Physical Activity Reviewed With Patient   How often do you exercise? 1 to 2 times per week   What is the duration of your exercise (in minutes)? 45 Minutes   What types of exercise do you do? walking   What keeps you from being more active? I am as active as I can possbily be    Pain         NUTRITION DIAGNOSIS:  Obesity r/t long history of positive energy balance aeb BMI >30 kg/m2. - improving    INTERVENTION:  Intervention Provided/Education Provided:  Reviewed post-op diet guidelines, ways to help prepare for post-op diet guidelines pre-operatively, portion/calorie-control, mindful eating and sources of protein.   Discussed continuing to use meal replacement shakes as needed to meet protein needs. Reviewed criteria for meal replacements.   Reviewed lean protein sources and low-fat food choices.    Provided pt with resources for more affordable groceries.   Patient demonstrates understanding.   Provided pt with list of goals via britebill.         4/5/2023     9:58 AM   Questions Reviewed With Patient   How ready are you to make changes regarding your weight? Number 1 = Not ready at all to make changes up to 10 = very ready. 10   How confident are you that you can change? 1 = Not confident that you will be successful making changes up to 10 = very confident. 1       Expected Engagement: good     Follow-up: 12/5/23    GOALS:  1) Eat 3 small, high-protein meals daily.   2) Continue to use protein shake instead of skipping a meal  3) Increase fluid intake, aim to consume 64+ oz/day.     Diet Guidelines after Weight Loss Surgery  [email or web address]     High potassium foods:  Baked potato, with skin 1 medium, 925 mg  White  beans, canned   cup, 595 mg  Avocado   fruit, 487 mg   Fish: halibut, tuna, cod, snapper 3 oz, 480 mg   Swiss chard   cup, cooked, 480 mg  Banana 1 medium, 425 mg  Spinach   cup cooked, 420 mg  Papaya 1 small, 391 mg  Milk: fat free, low fat, whole, buttermilk 1 cup (8 oz), 350-380 mg  [place] beans   cup, 353 mg  Artichoke, cooked 1 medium, 343 mg  Soy milk 1 cup (8 oz), 287 mg  Tomato or vegetable juice   cup (4 oz), 275 mg  Dates 5 pieces 270 Raisins   cup (2 oz), 270 mg  Potato, boiled   cup, 255 mg  [place] sprouts   cup, 250 mg  Tur[place] 3 oz, 250 mg  Sunflower or pumpkin seeds 1 oz, 240 mg  Yogurt   cup (4 oz), 238 mg   Orange 1 fruit, 237 mg  Broccoli   cup, 230 mg   Cantaloupe   cup, 215 mg   Nuts: almonds, peanuts, hazelnuts, [place], cashew, mixed 1 oz, 200mg   Tuna fish, canned 3 oz, 200mg       SpendSmart,Eat Smart  Recipe ideas that are suppose to be more affordable  Calculator that allows you to compare product prices   https://[email or web address]     Fare For All:  Provides discounted groceries. Up to 40% off retail pricing. You can preorder and  or buy in person, depending on the site. Visit their website for list of 38 locations in [place].  [email or web address] Facilitymn.[email or web address]    [employer] Market  Get organic produce and sustainably sourced groceries delivered at up to 40% off grocery store prices.  https://[email or web address].[email or web address]      [facility]  Fresh Produce Distribution Events and Free Food Markets in [place].   [email or web address]        Time spent with patient: 13 minutes.  Kanwal Hunter RD, LD

## 2024-01-11 NOTE — NURSING NOTE
Is the patient currently in the state of [place]? YES    Visit mode:VIDEO    If the visit is dropped, the patient can be reconnected by: VIDEO VISIT: Text to cell phone:   Telephone Information:   Mobile [phone]       Will anyone else be joining the visit? NO  (If patient encounters technical issues they should call [phone]    How would you like to obtain your AVS? MyChart    Are changes needed to the allergy or medication list? N/A    Reason for visit: NATHAN ASENCIO

## 2024-01-11 NOTE — PATIENT INSTRUCTIONS
Darryn Lindsey,     Follow-up with RD on 2/13    Thank you,    Kanwal Hunter, RD, LD  If you would like to schedule or reschedule an appointment with the RD, please call [phone]    Nutrition Goals  1) Eat 3 small, high-protein meals daily.   2) Continue to use protein shake instead of skipping a meal  3) Increase fluid intake, aim to consume 64+ oz/day.     Diet Guidelines after Weight Loss Surgery  [email or web address]     High potassium foods:  Baked potato, with skin 1 medium, 925 mg  White beans, canned   cup, 595 mg  Avocado   fruit, 487 mg   Fish: halibut, tuna, cod, snapper 3 oz, 480 mg   Swiss chard   cup, cooked, 480 mg  Banana 1 medium, 425 mg  Spinach   cup cooked, 420 mg  Papaya 1 small, 391 mg  Milk: fat free, low fat, whole, buttermilk 1 cup (8 oz), 350-380 mg  [place] beans   cup, 353 mg  Artichoke, cooked 1 medium, 343 mg  Soy milk 1 cup (8 oz), 287 mg  Tomato or vegetable juice   cup (4 oz), 275 mg  Dates 5 pieces 270 Raisins   cup (2 oz), 270 mg  Potato, boiled   cup, 255 mg  [place] sprouts   cup, 250 mg  Turkey 3 oz, 250 mg  Sunflower or pumpkin seeds 1 oz, 240 mg  Yogurt   cup (4 oz), 238 mg   Orange 1 fruit, 237 mg  Broccoli   cup, 230 mg   Cantaloupe   cup, 215 mg   Nuts: almonds, peanuts, hazelnuts, [place], cashew, mixed 1 oz, 200mg   Tuna fish, canned 3 oz, 200mg       SpendSmart,Eat Smart  Recipe ideas that are suppose to be more affordable  Calculator that allows you to compare product prices   https://spendsmart.extension.[facility]     Fare For All:  Provides discounted groceries. Up to 40% off retail pricing. You can preorder and  or buy in person, depending on the site. Visit their website for list of 38 locations in [place].  https://fareforall.[facility]food[email or web address].[email or web address]    Misfits Market  Get organic produce and sustainably sourced groceries delivered at up to 40% off grocery store prices.  https://www.Appthority.[employer]ogen      [facility]  Fresh Produce Distribution  Events and Free Food Markets in [place].   [email or web address]-[email or web address]      COMPREHENSIVE WEIGHT MANAGEMENT PROGRAM  VIRTUAL SUPPORT GROUPS    At [facility], our Comprehensive Weight Management program offers on-line support groups for patients who are working on weight loss and considering, preparing for, or have had weight loss surgery.     There is no cost for this opportunity.  You are invited to attend the?Virtual Support Groups?provided by any of the following locations:    [facility] via Microsoft Teams with Katy Garza RN  2.   [place] via NetProspex with Alhaji Blount, PhD, LP  3.   [place] via NetProspex with Jesica Banuelos RN  4.   [facility] via a Zoom Meeting with AKOSUA Ramos    The following Support Group information can also be found on our website:  [email or web address]/weight-loss-[email or web address]-weight-loss-surgery-support-groups      [facility] [place] Weight Loss Surgery Support Group  The support group is a patient-lead forum that meets monthly to share experiences, encouragement and education. It is open to those who have had weight loss surgery, are scheduled for surgery, or are considering surgery.   WHEN: This group meets on the 3rd Wednesday of each month from 5:00PM - 6:00PM virtually using Microsoft Teams.   FACILITATOR: Led by Katy Root RD, LD, RN, the program's Clinical Coordinator.   TO REGISTER: Please contact the clinic via Aut[employer] or call the nurse line directly at [phone] to inform our staff that you would like an invite sent to you and to let us know the email you would like the invite sent to. Prior to the meeting, a link with directions on how to join the meeting will be sent to you.    2023 and 2024 Meetings   December 20  January 17  February 21  March 20  April 17  May 15  [place] 19      [facility] Bariatric [facility]  "Support Group?  This is open to all pre- and post- operative bariatric surgery patients as well as their support system.   WHEN: This group meets the 3rd Tuesday of each month from 6:30 PM - 8:00 PM virtually using Microsoft Teams.   FACILITATOR: Led by Alhaji Blount, Ph.D who is a Licensed Psychologist with the [facility] [place] Comprehensive Weight Management Program.   TO REGISTER: Please send an email to Alhaji Blount, Ph.D.,  at?[email or web address]?if you would like an invitation to the group. Prior to the meeting, a link with directions on how to join the meeting will be sent to you.    2023 and 2024 Meetings  December 19 January 16: \"Medication Management and Bariatric Surgery\", Alicia Randhawa, PharmD, Pharmacy Resident at [facility]  February 20: \"A Bariatric Surgery Patient's Perspective\", TERESA [place], [facility], Behavioral Health Clinician at [facility]  March 19  April 16  May 21  [place] 18: \"Nutritional Labeling\", Dietitian speaker to be announced.  November 19: \"Holiday Eating\", Dietitian speaker to be announced.    [facility] Post-Operative Bariatric Surgery Support Group  This is a support group for [facility] [place] bariatric patients (and those external to [facility]) who have had bariatric surgery and are at least 3 months post-surgery.  WHEN: This support group meets the 4th Wednesday of the month from 11:00 AM - 12:00 PM virtually using Microsoft Teams.   FACILITATOR: Led by Certified Bariatric Nurse, Jesica Banuelos RN.   TO REGISTER: Please send an email to Jesica at [email or web address] if you would like an invitation to the group.  Prior to the meeting, a link with directions on how to join the meeting will be sent to you.    2023 and 2024 Meetings  December 27  January 24  February 28  March 27  April 24  May 22  [place] 26    [facility]" "Center Healthy Lifestyle Group?  This is a 60 minute virtual coaching group for those who want to lead a healthier lifestyle. Come together to set goals and overcome barriers in a supportive group environment. We will address the four pillars of health: nutrition, exercise, sleep and emotional well-being.  This group is highly recommended for those who are participating in the 24 week Healthy Lifestyle Plan and our Health Coaching sessions.  WHEN: This group meets the 1st Friday of the month, 12:30 PM - 1:30 PM online, via a zoom meeting.    FACILITATOR: Led by National Board Certified Health and , Jesica Granado, [facility].   TO REGISTER: Please call the Call Center at [phone] to register.  You will get an appointment to attend in [place]. Fifteen minutes prior to the meeting, complete the e-check in and you will get the link to join the meeting.    There is no charge to attend this group and space is limited.     2023 and 2024 Meetings  December 1: \"Let's Talk\" (guided discussion on our wins and challenges)  January 5: \"New Years Vision: Manifest your Best 2024!\" (guided imagery,  journaling and discussion)  February 2: \"Let's Talk\"  March 1: \"10 Percent Happier\" by Neftaly Betts (Book Bites - a guided discussion on the nuggets of wisdom from favorite wellness books, no need to read the book but highly encouraged)  April 5: \"Let's Talk\"  May 3: \"Essentialism: The Disciplined Pursuit of Less\" by Ashvin Villegas (Book Bites discussion)  June 7: \"Let's Talk\"  July 5: NO MEETING, off for the 4th of July Holiday  August 2: \"The Blue Zones, Secrets for Living a Longer Life\" by Neftaly Serrano (Book Bites discussion)                    "

## 2024-01-22 ENCOUNTER — VIRTUAL VISIT (OUTPATIENT)
Dept: PSYCHOLOGY | Facility: CLINIC | Age: 39
End: 2024-01-22
Attending: PHYSICIAN ASSISTANT
Payer: COMMERCIAL

## 2024-01-22 DIAGNOSIS — F54 PSYCHOLOGICAL FACTORS AFFECTING MEDICAL CONDITION: ICD-10-CM

## 2024-01-22 DIAGNOSIS — E66.01 CLASS 3 SEVERE OBESITY DUE TO EXCESS CALORIES WITH SERIOUS COMORBIDITY AND BODY MASS INDEX (BMI) OF 40.0 TO 44.9 IN ADULT (H): ICD-10-CM

## 2024-01-22 DIAGNOSIS — F33.1 MAJOR DEPRESSIVE DISORDER, RECURRENT EPISODE, MODERATE (H): Primary | ICD-10-CM

## 2024-01-22 DIAGNOSIS — F10.21 ALCOHOL USE DISORDER, MODERATE, IN EARLY REMISSION (H): ICD-10-CM

## 2024-01-22 DIAGNOSIS — E66.813 CLASS 3 SEVERE OBESITY DUE TO EXCESS CALORIES WITH SERIOUS COMORBIDITY AND BODY MASS INDEX (BMI) OF 40.0 TO 44.9 IN ADULT (H): ICD-10-CM

## 2024-01-22 PROCEDURE — 90791 PSYCH DIAGNOSTIC EVALUATION: CPT | Mod: 95 | Performed by: PSYCHOLOGIST
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
## 2024-02-19 ENCOUNTER — VIRTUAL VISIT (OUTPATIENT)
Dept: PSYCHOLOGY | Facility: CLINIC | Age: 39
End: 2024-02-19
Payer: COMMERCIAL

## 2024-02-19 DIAGNOSIS — E66.01 CLASS 3 SEVERE OBESITY DUE TO EXCESS CALORIES WITH SERIOUS COMORBIDITY AND BODY MASS INDEX (BMI) OF 40.0 TO 44.9 IN ADULT (H): ICD-10-CM

## 2024-02-19 DIAGNOSIS — F10.21 ALCOHOL USE DISORDER, MODERATE, IN EARLY REMISSION (H): ICD-10-CM

## 2024-02-19 DIAGNOSIS — E66.813 CLASS 3 SEVERE OBESITY DUE TO EXCESS CALORIES WITH SERIOUS COMORBIDITY AND BODY MASS INDEX (BMI) OF 40.0 TO 44.9 IN ADULT (H): ICD-10-CM

## 2024-02-19 DIAGNOSIS — F33.1 MAJOR DEPRESSIVE DISORDER, RECURRENT EPISODE, MODERATE (H): Primary | ICD-10-CM

## 2024-02-19 DIAGNOSIS — F54 PSYCHOLOGICAL FACTORS AFFECTING MEDICAL CONDITION: ICD-10-CM

## 2024-02-19 PROCEDURE — 90837 PSYTX W PT 60 MINUTES: CPT | Mod: 95 | Performed by: PSYCHOLOGIST

## 2024-02-19 ASSESSMENT — ANXIETY QUESTIONNAIRES
5. BEING SO RESTLESS THAT IT IS HARD TO SIT STILL: SEVERAL DAYS
GAD7 TOTAL SCORE: 12
6. BECOMING EASILY ANNOYED OR IRRITABLE: NEARLY EVERY DAY
7. FEELING AFRAID AS IF SOMETHING AWFUL MIGHT HAPPEN: NOT AT ALL
3. WORRYING TOO MUCH ABOUT DIFFERENT THINGS: SEVERAL DAYS
GAD7 TOTAL SCORE: 12
2. NOT BEING ABLE TO STOP OR CONTROL WORRYING: SEVERAL DAYS
IF YOU CHECKED OFF ANY PROBLEMS ON THIS QUESTIONNAIRE, HOW DIFFICULT HAVE THESE PROBLEMS MADE IT FOR YOU TO DO YOUR WORK, TAKE CARE OF THINGS AT HOME, OR GET ALONG WITH OTHER PEOPLE: EXTREMELY DIFFICULT
7. FEELING AFRAID AS IF SOMETHING AWFUL MIGHT HAPPEN: NOT AT ALL
1. FEELING NERVOUS, ANXIOUS, OR ON EDGE: NEARLY EVERY DAY
8. IF YOU CHECKED OFF ANY PROBLEMS, HOW DIFFICULT HAVE THESE MADE IT FOR YOU TO DO YOUR WORK, TAKE CARE OF THINGS AT HOME, OR GET ALONG WITH OTHER PEOPLE?: EXTREMELY DIFFICULT
4. TROUBLE RELAXING: NEARLY EVERY DAY
GAD7 TOTAL SCORE: 12

## 2024-02-19 ASSESSMENT — PATIENT HEALTH QUESTIONNAIRE - PHQ9
10. IF YOU CHECKED OFF ANY PROBLEMS, HOW DIFFICULT HAVE THESE PROBLEMS MADE IT FOR YOU TO DO YOUR WORK, TAKE CARE OF THINGS AT HOME, OR GET ALONG WITH OTHER PEOPLE: EXTREMELY DIFFICULT
SUM OF ALL RESPONSES TO PHQ QUESTIONS 1-9: 11
SUM OF ALL RESPONSES TO PHQ QUESTIONS 1-9: 11
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
## 2024-03-04 NOTE — PROGRESS NOTES
"Video-Visit Details    Type of service:  Video Visit    Video Start Time: 9:32 AM   Video End Time: 9:43 AM   Originating Location (pt. Location): Home    Distant Location (provider location):  Offsite (providers home) [facility] [place]     Platform used for Video Visit: Popularo    During this virtual visit the patient is located in [place], patient verifies this as the location during the entirety of this visit.       Bariatric Nutrition Consultation Note    Reason For Visit: Nutrition Assessment    César Son is a 38 year old presenting today for return bariatric nutrition consult.   Pt is interested in laparoscopic sleeve gastrectomy with Dr. Slade.  Patient is accompanied by self.  This is pt's 12th nutrition visits prior to surgery.     Coordination note:   Needs to establish with therapy - appt with Dr. Blunt 1/2024  Can plan for surgery after 1 year sobriety (August 2024 or later)     Pt referred by DILLON Vu on January 18, 2022.  Patient with Co-morbidities of obesity including:  Type II DM yes  Renal Failure no  Sleep apnea yes  Hypertension yes   Dyslipidemia no  Joint pain yes  Back pain yes  GERD no         4/5/2023     9:58 AM   Support System Reviewed With Patient   Who do you have in your support network that can be available to help you for the first 2 weeks after surgery? Myself and myself and my kids   Who can you count on for support throughout your weight loss surgery journey? Myself and my kids       ANTHROPOMETRICS:  Initial consult:  Estimated body mass index is 40.77 kg/m  as calculated from the following:    Height as of 12/27/21: 1.676 m (5' 6\").    Weight as of 12/27/21: 114.6 kg (252 lb 9.6 oz).    Recent in office visit (2/13/24): 255 lbs     Current weight (pt reported):   Estimated body mass index is 39.94 kg/m  as calculated from the following:    Height as of this encounter: 1.651 m (5' 5\").    Weight as of this encounter: 108.9 kg (240 " lb). (- 9 lbs over the last 2 month, -12 lbs from initial)      Required weight loss goal pre-op: 10 lbs from initial consult weight (goal weight 242 lbs or less before surgery)        4/5/2023     9:58 AM   --   I have tried the following methods to lose weight Watching portions or calories    Exercise    Atkins type diet (low carb/high protein)    Slimfast           4/5/2023     9:58 AM   Weight Loss Questions Reviewed With Patient   How long have you been overweight? Following one or more pregnancies       SUPPLEMENT INFORMATION:  Vitamin D 2000 units/day  Multivitamin    Baseline bariatric labs completed 6/29/23:  - Vitamin D 17 (L) - pt previously taking vitamin D supplement for repletion.   - All other labs normal     NUTRITION HISTORY:  NKFA. Does not tolerate greasy foods, example pt provided - ground beef  Previous experiences with loss: diet gummies (just made her go to the bathroom more), increased exercise.   She has 6 kids, 3-17 years old. 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   Often portioning out more food than she finds she can eat.     RD visit 3/16/23 - Starting PT for knee and back.     RD visit 4/17/23 - Continues to do PT, but describes worsening back and leg pain. Making it difficult to work, get quality sleep and participate in PT consistently. Recently she was admitted for kidney infection and pancreatitis. Has poor appetite r/t pain. Notes BP has been really high lately. Has visit scheduled with PCP today.      Last RD visit 5/16/23 - Working on collect clearances for surgery. Mom was hospitalized this past month and has been very stressful for pt. She continues to have little appetite. She is using protein shakes as meal replacements intermittently. She is drinking quite a bit of juice, but is staying away from pop.    7/13/23 - Mom passed away in June. Pt has been experiencing low appetite since mom passed. Not eating much. Working on eating earlier in the day.  Making good progress on collecting clearances for surgery.     8/11/23 - Completed Weight Loss Surgery Nutrition Class.     9/15/23 - Continues to have lower appetite, however reports eating more fruit and veggies. Low potassium noted at ED visit on 8/24 for pancreatitis. Additionally advised low-fat diet for pancreatitis. Staying away from pop, doing better with this recently.     10/31/23 - Reports appetite has been ok. More baked food lately. Has noticed she gets puckett sooner.     12/5/23 - Dealing with some tooth pain. Will be starting a potassium supplement for hypokalemia. Will be adjusting BP meds, has been really high lately. Staying away from pop. Continues to work on balanced meal preparation, would like to eat more fish but cost has been too high lately. Using protein shake intermittently, has a couple different ones.     1/11/24 - Had visit with PCP today, weight checked in clinic. Reports having vitamin lab rechecked here, unable to see labs yet. Reports being told she was dehydrated. Vitamin D supplement reduced to 2000 units/day. Reports consuming 1-2 meals daily. Notes having higher fat foods like hamburgers that may have contributed to recent weight gain.     Today - Recovering from recent cold, reports less appetite during this time, has returned now.  Continues to focus on using leaner protein options - baked fish/chicken.  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   Portioning out a full plate, but finding she is not finishing.     Diet Recall:  Breakfast: boiled egg and toast   Lunch: skip  Dinner: 2 pc of chicken and side  Beverages: Water, 1% milk, zero avinash sports drinks, Ensure Max from time to time.     Progress Towards Weight Loss:   1) Eat 3 small, high-protein meals daily. - Improving, adding breakfast in more often    2) Continue to use protein shake instead of skipping a meal - Improved, planning to use for lunch    3)  Increase fluid intake, aim to consume 64+ oz/day. - Met, continues     ADDITIONAL INFORMATION:        4/5/2023     9:58 AM   Physical Activity Reviewed With Patient   How often do you exercise? 1 to 2 times per week   What is the duration of your exercise (in minutes)? 45 Minutes   What types of exercise do you do? walking   What keeps you from being more active? I am as active as I can possbily be    Pain         NUTRITION DIAGNOSIS:  Obesity r/t long history of positive energy balance aeb BMI >30 kg/m2. - improving    INTERVENTION:  Intervention Provided/Education Provided:  Reviewed post-op diet guidelines, ways to help prepare for post-op diet guidelines pre-operatively, portion/calorie-control, mindful eating and sources of protein.   Discussed continuing to use meal replacement shakes as needed to meet protein needs.   Reviewed lean protein sources and low-fat food choices.    Patient demonstrates understanding.   Provided pt with list of goals via [employer].         4/5/2023     9:58 AM   Questions Reviewed With Patient   How ready are you to make changes regarding your weight? Number 1 = Not ready at all to make changes up to 10 = very ready. 10   How confident are you that you can change? 1 = Not confident that you will be successful making changes up to 10 = very confident. 1       Expected Engagement: good     Follow-up: 4/19/24    GOALS:  1) Use protein shake if unable to eat a lunch meal while working     Diet Guidelines after Weight Loss Surgery  [email or web address]       SpendSmart,Eat Smart  Recipe ideas that are suppose to be more affordable  Calculator that allows you to compare product prices   https://[email or web address]     Fare For All:  Provides discounted groceries. Up to 40% off retail pricing. You can preorder and  or buy in person, depending on the site. Visit their website for list of 38 locations in [place].  [email or web address] Vincent [email or web address] Medical [email or web address]/    [employer]  Market  Get organic produce and sustainably sourced groceries delivered at up to 40% off grocery store prices.  https://[email or web address].Nymirum      [facility]  Fresh Produce Distribution Events and Free Food Markets in [place].   [email or web address]/food-[email or web address]        Time spent with patient: 11 minutes.  Kanwal Hunter, RD, LD

## 2024-03-05 ENCOUNTER — VIRTUAL VISIT (OUTPATIENT)
Dept: ENDOCRINOLOGY | Facility: CLINIC | Age: 39
End: 2024-03-05
Payer: COMMERCIAL

## 2024-03-05 VITALS — WEIGHT: 240 LBS | HEIGHT: 65 IN | BODY MASS INDEX: 39.99 KG/M2

## 2024-03-05 DIAGNOSIS — E11.69 DIABETES MELLITUS TYPE 2 IN OBESE: ICD-10-CM

## 2024-03-05 DIAGNOSIS — Z71.3 NUTRITIONAL COUNSELING: ICD-10-CM

## 2024-03-05 DIAGNOSIS — E66.01 CLASS 3 SEVERE OBESITY DUE TO EXCESS CALORIES WITH SERIOUS COMORBIDITY AND BODY MASS INDEX (BMI) OF 40.0 TO 44.9 IN ADULT (H): Primary | ICD-10-CM

## 2024-03-05 DIAGNOSIS — E66.9 DIABETES MELLITUS TYPE 2 IN OBESE: ICD-10-CM

## 2024-03-05 DIAGNOSIS — E66.813 CLASS 3 SEVERE OBESITY DUE TO EXCESS CALORIES WITH SERIOUS COMORBIDITY AND BODY MASS INDEX (BMI) OF 40.0 TO 44.9 IN ADULT (H): Primary | ICD-10-CM

## 2024-03-05 PROCEDURE — 99207 PR NO CHARGE LOS: CPT | Mod: 95 | Performed by: DIETITIAN, REGISTERED

## 2024-03-05 PROCEDURE — 97803 MED NUTRITION INDIV SUBSEQ: CPT | Mod: 95 | Performed by: DIETITIAN, REGISTERED

## 2024-03-05 ASSESSMENT — PAIN SCALES - GENERAL: PAINLEVEL: NO PAIN (0)

## 2024-03-05 NOTE — NURSING NOTE
Is the patient currently in the state of [place]? YES    Visit mode:VIDEO    If the visit is dropped, the patient can be reconnected by: VIDEO VISIT: Text to cell phone:   Telephone Information:   Mobile [phone]       Will anyone else be joining the visit? NO  (If patient encounters technical issues they should call [phone]    How would you like to obtain your AVS? MyChart    Are changes needed to the allergy or medication list? Pt stated no changes to allergies and Pt stated no med changes    Reason for visit: Follow Up    Lily ASENCIO

## 2024-03-05 NOTE — PATIENT INSTRUCTIONS
Darryn Lindsey,    Follow-up with RD on 4/19    Thank you,    Kanwal Hunter, RD, LD  If you would like to schedule or reschedule an appointment with the RD, please call [phone]    Nutrition Goals  1) Use protein shake if unable to eat a lunch meal while working     Diet Guidelines after Weight Loss Surgery  [email or web address]       SpendSmart,Eat Smart  Recipe ideas that are suppose to be more affordable  Calculator that allows you to compare product prices   https://spendsmart.extension.[facility]     Fare For All:  Provides discounted groceries. Up to 40% off retail pricing. You can preorder and  or buy in person, depending on the site. Visit their website for list of 38 locations in [place].  https://Engineering Ideas."[employer].org/    [employer]  Get organic produce and sustainably sourced groceries delivered at up to 40% off grocery store prices.  https://www.Optimum Magazine.Lyft      [facility]  Fresh Produce Distribution Events and Free Food Markets in [place].   https://[employer].org/programs/food-support/        COMPREHENSIVE WEIGHT MANAGEMENT PROGRAM  VIRTUAL SUPPORT GROUPS    At [facility], our Comprehensive Weight Management program offers on-line support groups for patients who are working on weight loss and considering, preparing for, or have had weight loss surgery.     There is no cost for this opportunity.  You are invited to attend the?Virtual Support Groups?provided by any of the following locations:    [facility] via [employer] Teams with Katy Garza RN  2.   [place] via [employer] Teams with Alhaji Blount, PhD, LP  3.   [place] via [employer] Teams with Jesica Banuelos RN  4.   [facility] via a Zoom Meeting with OLI Ramos-    The following Support Group information can also be found on our website:  https://www.[facility].[email or web address]/treatments/weight-loss-and-weight-loss-surgery-support-groups      [facility]  "Weight Loss Surgery Support Group  The support group is a patient-lead forum that meets monthly to share experiences, encouragement and education. It is open to those who have had weight loss surgery, are scheduled for surgery, or are considering surgery.   WHEN: This group meets on the 3rd Wednesday of each month from 5:00PM - 6:00PM virtually using Microsoft Teams.   FACILITATOR: Led by Katy Root RD, LD, RN, the program's Clinical Coordinator.   TO REGISTER: Please contact the clinic via [employer] or call the nurse line directly at [phone] to inform our staff that you would like an invite sent to you and to let us know the email you would like the invite sent to. Prior to the meeting, a link with directions on how to join the meeting will be sent to you.    2023 and 2024 Meetings   December 20  January 17  February 21  March 20  April 17  May 15  Kimberley 19      [facility], located within [facility] Bariatric Care Support Group?  This is open to all pre- and post- operative bariatric surgery patients as well as their support system.   WHEN: This group meets the 3rd Tuesday of each month from 6:30 PM - 8:00 PM virtually using Microsoft Teams.   FACILITATOR: Led by Alhaji Blount, Ph.D who is a Licensed Psychologist with the [facility] Comprehensive Weight Management Program.   TO REGISTER: Please send an email to Alhaji Blount, Ph.D., LP at?[email or web address]?if you would like an invitation to the group. Prior to the meeting, a link with directions on how to join the meeting will be sent to you.    2023 and 2024 Meetings  December 19 January 16: \"Medication Management and Bariatric Surgery\", Alicia Randhawa, PharmD, Pharmacy Resident at [facility]  February 20: \"A Bariatric Surgery Patient's Perspective\", TERESA Sultana, [facility], Behavioral Health Clinician at [facility]  March 19  April 16  May 21  Kimberley 18: \"Nutritional " "Labeling\", Dietitian speaker to be announced.  November 19: \"Holiday Eating\", Dietitian speaker to be announced.    [facility] Post-Operative Bariatric Surgery Support Group  This is a support group for [facility] bariatric patients (and those external to [facility]) who have had bariatric surgery and are at least 3 months post-surgery.  WHEN: This support group meets the 4th Wednesday of the month from 11:00 AM - 12:00 PM virtually using Microsoft Teams.   FACILITATOR: Led by Certified Bariatric Nurse, Jesica Banuelos RN.   TO REGISTER: Please send an email to Jesica at [email or web address] if you would like an invitation to the group.  Prior to the meeting, a link with directions on how to join the meeting will be sent to you.    2023 and 2024 Meetings  December 27  January 24  February 28  March 27  April 24  May 22  Kimberley 26    [facility] Healthy Lifestyle Group?  This is a 60 minute virtual coaching group for those who want to lead a healthier lifestyle. Come together to set goals and overcome barriers in a supportive group environment. We will address the four pillars of health: nutrition, exercise, sleep and emotional well-being.  This group is highly recommended for those who are participating in the 24 week Healthy Lifestyle Plan and our Health Coaching sessions.  WHEN: This group meets the 1st Friday of the month, 12:30 PM - 1:30 PM online, via a zoom meeting.    FACILITATOR: Led by National Board Certified Health and , Jesica Granado [facility].   TO REGISTER: Please call the Call Center at [phone] to register.  You will get an appointment to attend in [place]. Fifteen minutes prior to the meeting, complete the e-check in and you will get the link to join the meeting.    There is no charge to attend this group and space is limited.     2023 and 2024 Meetings  December 1: " "\"Let's Talk\" (guided discussion on our wins and challenges)  January 5: \"New Years Vision: Manifest your Best 2024!\" (guided imagery,  journaling and discussion)  February 2: \"Let's Talk\"  March 1: \"10 Percent Happier\" by Neftaly Betts (Book Bites - a guided discussion on the nuggets of wisdom from favorite wellness books, no need to read the book but highly encouraged)  April 5: \"Let's Talk\"  May 3: \"Essentialism: The Disciplined Pursuit of Less\" by Ashvin Villegas (Book Bites discussion)  June 7: \"Let's Talk\"  July 5: NO MEETING, off for the 4th of July Holiday  August 2: \"The Blue Zones, Secrets for Living a Longer Life\" by Neftaly Serrano (Book Bites discussion)                    "

## 2024-03-05 NOTE — LETTER
3/5/2024       RE: César Son  [address] [place] [address]     Dear Colleague,    Thank you for referring your patient, César Son, to the [facility] [place] at [facility]. Please see a copy of my visit note below.    Video-Visit Details    Type of service:  Video Visit    Video Start Time: 9:32 AM   Video End Time: 9:43 AM   Originating Location (pt. Location): Home    Distant Location (provider location):  Offsite (providers home) [facility] [place]     Platform used for Video Visit: [employer]    During this virtual visit the patient is located in [place], patient verifies this as the location during the entirety of this visit.       Bariatric Nutrition Consultation Note    Reason For Visit: Nutrition Assessment    César Son is a 38 year old presenting today for return bariatric nutrition consult.   Pt is interested in laparoscopic sleeve gastrectomy with Dr. Slade.  Patient is accompanied by self.  This is pt's 12th nutrition visits prior to surgery.     Coordination note:   Needs to establish with therapy - appt with Dr. Blunt 1/2024  Can plan for surgery after 1 year sobriety (August 2024 or later)     Pt referred by DILLON Vu on January 18, 2022.  Patient with Co-morbidities of obesity including:  Type II DM yes  Renal Failure no  Sleep apnea yes  Hypertension yes   Dyslipidemia no  Joint pain yes  Back pain yes  GERD no         4/5/2023     9:58 AM   Support System Reviewed With Patient   Who do you have in your support network that can be available to help you for the first 2 weeks after surgery? Myself and myself and my kids   Who can you count on for support throughout your weight loss surgery journey? Myself and my kids       ANTHROPOMETRICS:  Initial consult:  Estimated body mass index is 40.77 kg/m  as calculated from the following:    Height as of  "12/27/21: 1.676 m (5' 6\").    Weight as of 12/27/21: 114.6 kg (252 lb 9.6 oz).    Recent in office visit (2/13/24): 255 lbs     Current weight (pt reported):   Estimated body mass index is 39.94 kg/m  as calculated from the following:    Height as of this encounter: 1.651 m (5' 5\").    Weight as of this encounter: 108.9 kg (240 lb). (- 9 lbs over the last 2 month, -12 lbs from initial)      Required weight loss goal pre-op: 10 lbs from initial consult weight (goal weight 242 lbs or less before surgery)        4/5/2023     9:58 AM   --   I have tried the following methods to lose weight Watching portions or calories    Exercise    Atkins type diet (low carb/high protein)    Slimfast           4/5/2023     9:58 AM   Weight Loss Questions Reviewed With Patient   How long have you been overweight? Following one or more pregnancies       SUPPLEMENT INFORMATION:  Vitamin D 2000 units/day  Multivitamin    Baseline bariatric labs completed 6/29/23:  - Vitamin D 17 (L) - pt previously taking vitamin D supplement for repletion.   - All other labs normal     NUTRITION HISTORY:  NKFA. Does not tolerate greasy foods, example pt provided - ground beef  Previous experiences with loss: diet gummies (just made her go to the bathroom more), increased exercise.   She has 6 kids, 3-17 years old. 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   Often portioning out more food than she finds she can eat.     RD visit 3/16/23 - Starting PT for knee and back.     RD visit 4/17/23 - Continues to do PT, but describes worsening back and leg pain. Making it difficult to work, get quality sleep and participate in PT consistently. Recently she was admitted for kidney infection and pancreatitis. Has poor appetite r/t pain. Notes BP has been really high lately. Has visit scheduled with PCP today.      Last RD visit 5/16/23 - Working on collect clearances for surgery. Mom was hospitalized this past month and has been very stressful for " pt. She continues to have little appetite. She is using protein shakes as meal replacements intermittently. She is drinking quite a bit of juice, but is staying away from pop.    7/13/23 - Mom passed away in June. Pt has been experiencing low appetite since mom passed. Not eating much. Working on eating earlier in the day. Making good progress on collecting clearances for surgery.     8/11/23 - Completed Weight Loss Surgery Nutrition Class.     9/15/23 - Continues to have lower appetite, however reports eating more fruit and veggies. Low potassium noted at ED visit on 8/24 for pancreatitis. Additionally advised low-fat diet for pancreatitis. Staying away from pop, doing better with this recently.     10/31/23 - Reports appetite has been ok. More baked food lately. Has noticed she gets puckett sooner.     12/5/23 - Dealing with some tooth pain. Will be starting a potassium supplement for hypokalemia. Will be adjusting BP meds, has been really high lately. Staying away from pop. Continues to work on balanced meal preparation, would like to eat more fish but cost has been too high lately. Using protein shake intermittently, has a couple different ones.     1/11/24 - Had visit with PCP today, weight checked in clinic. Reports having vitamin lab rechecked here, unable to see labs yet. Reports being told she was dehydrated. Vitamin D supplement reduced to 2000 units/day. Reports consuming 1-2 meals daily. Notes having higher fat foods like hamburgers that may have contributed to recent weight gain.     Today - Recovering from recent cold, reports less appetite during this time, has returned now.  Continues to focus on using leaner protein options - baked fish/chicken.  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   Portioning out a full plate, but finding she is not finishing.     Diet Recall:  Breakfast: boiled egg and toast   Lunch: skip  Dinner: 2 pc of  chicken and side  Beverages: Water, 1% milk, zero avinash sports drinks, Ensure Max from time to time.     Progress Towards Weight Loss:   1) Eat 3 small, high-protein meals daily. - Improving, adding breakfast in more often    2) Continue to use protein shake instead of skipping a meal - Improved, planning to use for lunch    3) Increase fluid intake, aim to consume 64+ oz/day. - Met, continues     ADDITIONAL INFORMATION:        4/5/2023     9:58 AM   Physical Activity Reviewed With Patient   How often do you exercise? 1 to 2 times per week   What is the duration of your exercise (in minutes)? 45 Minutes   What types of exercise do you do? walking   What keeps you from being more active? I am as active as I can possbily be    Pain         NUTRITION DIAGNOSIS:  Obesity r/t long history of positive energy balance aeb BMI >30 kg/m2. - improving    INTERVENTION:  Intervention Provided/Education Provided:  Reviewed post-op diet guidelines, ways to help prepare for post-op diet guidelines pre-operatively, portion/calorie-control, mindful eating and sources of protein.   Discussed continuing to use meal replacement shakes as needed to meet protein needs.   Reviewed lean protein sources and low-fat food choices.    Patient demonstrates understanding.   Provided pt with list of goals via [employer].         4/5/2023     9:58 AM   Questions Reviewed With Patient   How ready are you to make changes regarding your weight? Number 1 = Not ready at all to make changes up to 10 = very ready. 10   How confident are you that you can change? 1 = Not confident that you will be successful making changes up to 10 = very confident. 1       Expected Engagement: good     Follow-up: 4/19/24    GOALS:  1) Use protein shake if unable to eat a lunch meal while working     Diet Guidelines after Weight Loss Surgery  [email or web address]       SpendSmart,Eat Smart  Recipe ideas that are suppose to be more affordable  Calculator that allows you  to compare product prices   https://spendsmart.extension.[facility]     Fare For All:  Provides discounted groceries. Up to 40% off retail pricing. You can preorder and  or buy in person, depending on the site. Visit their website for list of 38 locations in [place].  [email or web address].[email or web address]    [employer] Market  Get organic produce and sustainably sourced groceries delivered at up to 40% off grocery store prices.  https://www.[employer] [facility]  Fresh Produce Distribution Events and Free Food Markets in [place].   [email or web address]/programs/food-support/        Time spent with patient: 11 minutes.  Kanwal Hunter RD, LD

## 2024-04-01 PROBLEM — E66.9 TYPE 2 DIABETES MELLITUS WITH OBESITY (H): Status: ACTIVE | Noted: 2021-12-14

## 2024-04-01 PROBLEM — E11.69 TYPE 2 DIABETES MELLITUS WITH OBESITY (H): Status: ACTIVE | Noted: 2021-12-14

## 2024-04-19 ENCOUNTER — TELEPHONE (OUTPATIENT)
Dept: ENDOCRINOLOGY | Facility: CLINIC | Age: 39
End: 2024-04-19

## 2024-04-19 NOTE — TELEPHONE ENCOUNTER
Patient needs to be rescheduled for their virtual visit due to Reason for Reschedule: No-Show    Appointment mode: Video  Provider: Kanwal Hunter RD    LVM x1 for check in, call x2 for check in attempts, VM full

## 2024-04-29 ENCOUNTER — TELEPHONE (OUTPATIENT)
Dept: ENDOCRINOLOGY | Facility: CLINIC | Age: 39
End: 2024-04-29

## 2024-04-29 ENCOUNTER — VIRTUAL VISIT (OUTPATIENT)
Dept: ENDOCRINOLOGY | Facility: CLINIC | Age: 39
End: 2024-04-29
Payer: COMMERCIAL

## 2024-04-29 VITALS — BODY MASS INDEX: 39.94 KG/M2 | HEIGHT: 65 IN

## 2024-04-29 DIAGNOSIS — Z71.3 NUTRITIONAL COUNSELING: Primary | ICD-10-CM

## 2024-04-29 DIAGNOSIS — E66.9 OBESITY: ICD-10-CM

## 2024-04-29 DIAGNOSIS — E66.9 TYPE 2 DIABETES MELLITUS WITH OBESITY (H): ICD-10-CM

## 2024-04-29 DIAGNOSIS — E11.69 TYPE 2 DIABETES MELLITUS WITH OBESITY (H): ICD-10-CM

## 2024-04-29 PROCEDURE — 97803 MED NUTRITION INDIV SUBSEQ: CPT | Mod: 95 | Performed by: DIETITIAN, REGISTERED

## 2024-04-29 PROCEDURE — 99207 PR NO CHARGE LOS: CPT | Mod: 95 | Performed by: DIETITIAN, REGISTERED

## 2024-04-29 ASSESSMENT — PAIN SCALES - GENERAL: PAINLEVEL: WORST PAIN (10)

## 2024-04-29 NOTE — NURSING NOTE
Is the patient currently in the state of [place]? YES    Visit mode:VIDEO    If the visit is dropped, the patient can be reconnected by: VIDEO VISIT: Text to cell phone:   Telephone Information:   Mobile [phone]       Will anyone else be joining the visit? NO  (If patient encounters technical issues they should call [phone]    How would you like to obtain your AVS? MyChart    Are changes needed to the allergy or medication list? No    Are refills needed on medications prescribed by this physician? NO    Reason for visit: RECHOFELIA ASENCIO

## 2024-04-29 NOTE — PROGRESS NOTES
"Video-Visit Details    Type of service:  Video Visit    Video Start Time: 11:07 am  Video End Time:  11:18 am  *Connection issues   Re-start time: 11:21 am  End time:  11:34 am    Total time: 24 min    Originating Location (pt. Location): Home    Distant Location (provider location):  Offsite (providers home) [facility] [place]     Platform used for Video Visit: AmWell      Bariatric Nutrition Consultation Note    Reason For Visit: Nutrition Assessment    César Son is a 38 year old presenting today for return bariatric nutrition consult.   Pt is interested in laparoscopic sleeve gastrectomy with Dr. Slade.  Patient is accompanied by self.  This is pt's 12th+ nutrition visit prior to surgery.     Coordination note:   Needs to establish with therapy - recently started working with Dr. Blunt. April 16th visit says \"left without being seen\"   Can plan for surgery after 1 year sobriety (August 2024 or later)     Pt referred by DILLON Vu on January 18, 2022.  Patient with Co-morbidities of obesity including:  Type II DM yes  Renal Failure no  Sleep apnea yes  Hypertension yes   Dyslipidemia no  Joint pain yes  Back pain yes  GERD no         4/5/2023     9:58 AM   Support System Reviewed With Patient   Who do you have in your support network that can be available to help you for the first 2 weeks after surgery? Myself and myself and my kids   Who can you count on for support throughout your weight loss surgery journey? Myself and my kids       ANTHROPOMETRICS:  Initial consult:  Estimated body mass index is 40.77 kg/m  as calculated from the following:    Height as of 12/27/21: 1.676 m (5' 6\").    Weight as of 12/27/21: 114.6 kg (252 lb 9.6 oz).    Recent in office visit (2/13/24): 255 lbs     Current weight (pt reported from 3/5 - no updated weight. Has an appointment tomorrow and plans to get weight check during that)    Estimated body mass index is 39.94 kg/m  as " "calculated from the following:    Height as of this encounter: 1.651 m (5' 5\").    Weight as of 3/5/24: 108.9 kg (240 lb).     Required weight loss goal pre-op: 10 lbs from initial consult weight (goal weight 242 lbs or less before surgery)        4/5/2023     9:58 AM   --   I have tried the following methods to lose weight Watching portions or calories    Exercise    Atkins type diet (low carb/high protein)    Slimfast           4/5/2023     9:58 AM   Weight Loss Questions Reviewed With Patient   How long have you been overweight? Following one or more pregnancies       SUPPLEMENT INFORMATION:  Vitamin D 2000 units/day  Multivitamin    Baseline bariatric labs completed 6/29/23:  - Vitamin D 17 (L) - pt previously taking vitamin D supplement for repletion.   - All other labs normal     NUTRITION HISTORY:  NKFA. Does not tolerate greasy foods, example pt provided - ground beef  Previous experiences with loss: diet gummies (just made her go to the bathroom more), increased exercise.   She has 6 kids, 3-17 years old. 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   Often portioning out more food than she finds she can eat.     RD visit 3/16/23 - Starting PT for knee and back.     RD visit 4/17/23 - Continues to do PT, but describes worsening back and leg pain. Making it difficult to work, get quality sleep and participate in PT consistently. Recently she was admitted for kidney infection and pancreatitis. Has poor appetite r/t pain. Notes BP has been really high lately. Has visit scheduled with PCP today.      Last RD visit 5/16/23 - Working on collect clearances for surgery. Mom was hospitalized this past month and has been very stressful for pt. She continues to have little appetite. She is using protein shakes as meal replacements intermittently. She is drinking quite a bit of juice, but is staying away from pop.    7/13/23 - Mom passed away in June. Pt has been experiencing low appetite since mom " passed. Not eating much. Working on eating earlier in the day. Making good progress on collecting clearances for surgery.     8/11/23 - Completed Weight Loss Surgery Nutrition Class.     9/15/23 - Continues to have lower appetite, however reports eating more fruit and veggies. Low potassium noted at ED visit on 8/24 for pancreatitis. Additionally advised low-fat diet for pancreatitis. Staying away from pop, doing better with this recently.     10/31/23 - Reports appetite has been ok. More baked food lately. Has noticed she gets puckett sooner.     12/5/23 - Dealing with some tooth pain. Will be starting a potassium supplement for hypokalemia. Will be adjusting BP meds, has been really high lately. Staying away from pop. Continues to work on balanced meal preparation, would like to eat more fish but cost has been too high lately. Using protein shake intermittently, has a couple different ones.     1/11/24 - Had visit with PCP today, weight checked in clinic. Reports having vitamin lab rechecked here, unable to see labs yet. Reports being told she was dehydrated. Vitamin D supplement reduced to 2000 units/day. Reports consuming 1-2 meals daily. Notes having higher fat foods like hamburgers that may have contributed to recent weight gain.     3/5/24 - Recovering from recent cold, reports less appetite during this time, has returned now.  Continues to focus on using leaner protein options - baked fish/chicken.  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   Portioning out a full plate, but finding she is not finishing.     Diet Recall:  Breakfast: boiled egg and toast   Lunch: skip  Dinner: 2 pc of chicken and side  Beverages: Water, 1% milk, zero avinash sports drinks, Ensure Max from time to time.     April 2024:  No showed Kanwal earlier this month - got called into work per pt.     Reviewed task list. Psych eval and therapy appts in progress per pt. Needs  PCP letter of support per chart review. Sent templates to pt via [employer] per her request. Recently missed a visit with Dr. Blunt per chart review - pt asked for help rescheduling this. Msg sent to clinic coordinators.     No dietary questions or concerns per pt. Did not discuss in detail due to majority of time spent addressing task list questions.   Has not been able to afford protein shakes recently but wants to try to get more. Money tighter with prom and graduation for her daughter. Daughter will be going to college next year.     Drinking lots of water lately. Some juice. Also does Redbull (works overnights) but not feeling good lately so trying to cut back.   Pop - maybe 1x/month. Use to crave them.    PA: tries to stay active but lots of knee pain, seeing someone in orthopedics per pt. Not as active lately and feels she has gained some weight.    Progress Towards Weight Loss:   1) Use protein shake if unable to eat a lunch meal while working - Not able to afford recently per pt    ADDITIONAL INFORMATION:        4/5/2023     9:58 AM   Physical Activity Reviewed With Patient   How often do you exercise? 1 to 2 times per week   What is the duration of your exercise (in minutes)? 45 Minutes   What types of exercise do you do? walking   What keeps you from being more active? I am as active as I can possbily be    Pain         NUTRITION DIAGNOSIS:  Obesity r/t long history of positive energy balance aeb BMI >30 kg/m2. - improving    INTERVENTION:  Intervention Provided/Education Provided:  Reviewed post-op diet guidelines, ways to help prepare for post-op diet guidelines pre-operatively, portion/calorie-control, mindful eating and sources of protein.   Discussed continuing to use meal replacement shakes as needed to meet protein needs.   Reviewed lean protein sources and low-fat food choices.    Patient demonstrates understanding.   Provided pt with list of goals via [employer].         4/5/2023     9:58 AM    Questions Reviewed With Patient   How ready are you to make changes regarding your weight? Number 1 = Not ready at all to make changes up to 10 = very ready. 10   How confident are you that you can change? 1 = Not confident that you will be successful making changes up to 10 = very confident. 1       Expected Engagement: good     GOALS:  Task list items to do: letter of support from therapist, psychological evaluation, letter of support from psychiatrist, and letter of support from Dr. Haas   Could consider Aldi s protein shakes - Elevation. Also recommend checking at [employer] for more affordable options.   Continue working on reducing Red Bull and juice intake. Could do sugar-free Gatorade instead.   Keep up the hard work with less pop and more water.    Diet Guidelines after Weight Loss Surgery  [email or web address]       SpendSmart,Eat Smart  Recipe ideas that are suppose to be more affordable  Calculator that allows you to compare product prices   https://spendsmart.extension.[facility]     Fare For All:  Provides discounted groceries. Up to 40% off retail pricing. You can preorder and  or buy in person, depending on the site. Visit their website for list of 38 locations in [place].  [email or web address]    [employer] Market  Get organic produce and sustainably sourced groceries delivered at up to 40% off grocery store prices.  https://www.Qu[employer]Sense.[employer]      [facility]  Fresh Produce Distribution Events and Free Food Markets in [place].   [email or web address]/programs/food-support/    Follow up:  Tuesday, June 4th at 12:00 pm (no available appointments in May around the time she was due)    Time spent with patient: 24 minutes.  Rianna LUI, RD, LD

## 2024-04-29 NOTE — LETTER
"4/29/2024       RE: César Son  [address] [place] [address]     Dear Colleague,    Thank you for referring your patient, César Son, to the [facility] [place] at [facility]. Please see a copy of my visit note below.    Video-Visit Details    Type of service:  Video Visit    Video Start Time: 11:07 am  Video End Time:  11:18 am  *Connection issues   Re-start time: 11:21 am  End time:  11:34 am    Total time: 24 min    Originating Location (pt. Location): Home    Distant Location (provider location):  Offsite (providers home) [facility] [place]     Platform used for Video Visit: AmWell      Bariatric Nutrition Consultation Note    Reason For Visit: Nutrition Assessment    César Son is a 38 year old presenting today for return bariatric nutrition consult.   Pt is interested in laparoscopic sleeve gastrectomy with Dr. Sldae.  Patient is accompanied by self.  This is pt's 12th+ nutrition visit prior to surgery.     Coordination note:   Needs to establish with therapy - recently started working with Dr. Blunt. April 16th visit says \"left without being seen\"   Can plan for surgery after 1 year sobriety (August 2024 or later)     Pt referred by DILLON Vu on January 18, 2022.  Patient with Co-morbidities of obesity including:  Type II DM yes  Renal Failure no  Sleep apnea yes  Hypertension yes   Dyslipidemia no  Joint pain yes  Back pain yes  GERD no         4/5/2023     9:58 AM   Support System Reviewed With Patient   Who do you have in your support network that can be available to help you for the first 2 weeks after surgery? Myself and myself and my kids   Who can you count on for support throughout your weight loss surgery journey? Myself and my kids       ANTHROPOMETRICS:  Initial consult:  Estimated body mass index is 40.77 kg/m  as calculated from the " "following:    Height as of 12/27/21: 1.676 m (5' 6\").    Weight as of 12/27/21: 114.6 kg (252 lb 9.6 oz).    Recent in office visit (2/13/24): 255 lbs     Current weight (pt reported from 3/5 - no updated weight. Has an appointment tomorrow and plans to get weight check during that)    Estimated body mass index is 39.94 kg/m  as calculated from the following:    Height as of this encounter: 1.651 m (5' 5\").    Weight as of 3/5/24: 108.9 kg (240 lb).     Required weight loss goal pre-op: 10 lbs from initial consult weight (goal weight 242 lbs or less before surgery)        4/5/2023     9:58 AM   --   I have tried the following methods to lose weight Watching portions or calories    Exercise    Atkins type diet (low carb/high protein)    Slimfast           4/5/2023     9:58 AM   Weight Loss Questions Reviewed With Patient   How long have you been overweight? Following one or more pregnancies       SUPPLEMENT INFORMATION:  Vitamin D 2000 units/day  Multivitamin    Baseline bariatric labs completed 6/29/23:  - Vitamin D 17 (L) - pt previously taking vitamin D supplement for repletion.   - All other labs normal     NUTRITION HISTORY:  NKFA. Does not tolerate greasy foods, example pt provided - ground beef  Previous experiences with loss: diet gummies (just made her go to the bathroom more), increased exercise.   She has 6 kids, 3-17 years old. 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   Often portioning out more food than she finds she can eat.     RD visit 3/16/23 - Starting PT for knee and back.     RD visit 4/17/23 - Continues to do PT, but describes worsening back and leg pain. Making it difficult to work, get quality sleep and participate in PT consistently. Recently she was admitted for kidney infection and pancreatitis. Has poor appetite r/t pain. Notes BP has been really high lately. Has visit scheduled with PCP today.      Last RD visit 5/16/23 - Working on collect clearances for surgery. Mom " was hospitalized this past month and has been very stressful for pt. She continues to have little appetite. She is using protein shakes as meal replacements intermittently. She is drinking quite a bit of juice, but is staying away from pop.    7/13/23 - Mom passed away in June. Pt has been experiencing low appetite since mom passed. Not eating much. Working on eating earlier in the day. Making good progress on collecting clearances for surgery.     8/11/23 - Completed Weight Loss Surgery Nutrition Class.     9/15/23 - Continues to have lower appetite, however reports eating more fruit and veggies. Low potassium noted at ED visit on 8/24 for pancreatitis. Additionally advised low-fat diet for pancreatitis. Staying away from pop, doing better with this recently.     10/31/23 - Reports appetite has been ok. More baked food lately. Has noticed she gets puckett sooner.     12/5/23 - Dealing with some tooth pain. Will be starting a potassium supplement for hypokalemia. Will be adjusting BP meds, has been really high lately. Staying away from pop. Continues to work on balanced meal preparation, would like to eat more fish but cost has been too high lately. Using protein shake intermittently, has a couple different ones.     1/11/24 - Had visit with PCP today, weight checked in clinic. Reports having vitamin lab rechecked here, unable to see labs yet. Reports being told she was dehydrated. Vitamin D supplement reduced to 2000 units/day. Reports consuming 1-2 meals daily. Notes having higher fat foods like hamburgers that may have contributed to recent weight gain.     3/5/24 - Recovering from recent cold, reports less appetite during this time, has returned now.  Continues to focus on using leaner protein options - baked fish/chicken.  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   Portioning out a full plate, but finding she is not finishing.     Diet  Recall:  Breakfast: boiled egg and toast   Lunch: skip  Dinner: 2 pc of chicken and side  Beverages: Water, 1% milk, zero avinash sports drinks, Ensure Max from time to time.     April 2024:  No showed Kanwal earlier this month - got called into work per pt.     Reviewed task list. Psych eval and therapy appts in progress per pt. Needs PCP letter of support per chart review. Sent templates to pt via [employer] per her request. Recently missed a visit with Dr. Blunt per chart review - pt asked for help rescheduling this. Msg sent to clinic coordinators.     No dietary questions or concerns per pt. Did not discuss in detail due to majority of time spent addressing task list questions.   Has not been able to afford protein shakes recently but wants to try to get more. Money tighter with prom and graduation for her daughter. Daughter will be going to college next year.     Drinking lots of water lately. Some juice. Also does Redbull (works overnights) but not feeling good lately so trying to cut back.   Pop - maybe 1x/month. Use to crave them.    PA: tries to stay active but lots of knee pain, seeing someone in orthopedics per pt. Not as active lately and feels she has gained some weight.    Progress Towards Weight Loss:   1) Use protein shake if unable to eat a lunch meal while working - Not able to afford recently per pt    ADDITIONAL INFORMATION:        4/5/2023     9:58 AM   Physical Activity Reviewed With Patient   How often do you exercise? 1 to 2 times per week   What is the duration of your exercise (in minutes)? 45 Minutes   What types of exercise do you do? walking   What keeps you from being more active? I am as active as I can possbily be    Pain         NUTRITION DIAGNOSIS:  Obesity r/t long history of positive energy balance aeb BMI >30 kg/m2. - improving    INTERVENTION:  Intervention Provided/Education Provided:  Reviewed post-op diet guidelines, ways to help prepare for post-op diet guidelines  pre-operatively, portion/calorie-control, mindful eating and sources of protein.   Discussed continuing to use meal replacement shakes as needed to meet protein needs.   Reviewed lean protein sources and low-fat food choices.    Patient demonstrates understanding.   Provided pt with list of goals via [employer].         4/5/2023     9:58 AM   Questions Reviewed With Patient   How ready are you to make changes regarding your weight? Number 1 = Not ready at all to make changes up to 10 = very ready. 10   How confident are you that you can change? 1 = Not confident that you will be successful making changes up to 10 = very confident. 1       Expected Engagement: good     GOALS:  Task list items to do: letter of support from therapist, psychological evaluation, letter of support from psychiatrist, and letter of support from Dr. Haas   Could consider Aldi s protein shakes - Elevation. Also recommend checking at [employer] for more affordable options.   Continue working on reducing Red Bull and juice intake. Could do sugar-free Gatorade instead.   Keep up the hard work with less pop and more water.    Diet Guidelines after Weight Loss Surgery  [email or web address]       SpendSmart,Eat Smart  Recipe ideas that are suppose to be more affordable  Calculator that allows you to compare product prices   https://spendsmart.extension.[facility]     Fare For All:  Provides discounted groceries. Up to 40% off retail pricing. You can preorder and  or buy in person, depending on the site. Visit their website for list of 38 locations in [place].  [email or web address]    [employer] Market  Get organic produce and sustainably sourced groceries delivered at up to 40% off grocery store prices.  https://www.Netpulse.[employer]      [facility]  Fresh Produce Distribution Events and Free Food Markets in [place].   [email or web address]://[facility].[email or web address]/programs/food-support/    Follow up:  Tuesday, June 4th at  12:00 pm (no available appointments in May around the time she was due)    Time spent with patient: 24 minutes.  Rianna LUI, RD, LD

## 2024-04-29 NOTE — PATIENT INSTRUCTIONS
GOALS:  Task list items to do: letter of support from therapist, psychological evaluation, letter of support from psychiatrist, and letter of support from Dr. Haas   Could consider Patriciai s protein shakes - Elevation. Also recommend checking at [employer] for more affordable options.   Continue working on reducing Red Bull and juice intake. Could do sugar-free Gatorade instead.   Keep up the hard work with less pop and more water.    Diet Guidelines after Weight Loss Surgery  [email or web address]       SpendSmart,Eat Smart  Recipe ideas that are suppose to be more affordable  Calculator that allows you to compare product prices   https://spendsmart.extension.[facility]     Fare For All:  Provides discounted groceries. Up to 40% off retail pricing. You can preorder and  or buy in person, depending on the site. Visit their website for list of 38 locations in [place].  [email or web address] Rental [email or web address].[email or web address]/    [employer] Market  Get organic produce and sustainably sourced groceries delivered at up to 40% off grocery store prices.  https://www.[employer]      [facility]  Fresh Produce Distribution Events and Free Food Markets in [place].   [email or web address]/programs/food-support/    Follow up:  Tuesday, June 4th at 12:00 pm (no available appointments in May around the time she was due)    Rianna Nicole (Duncan), SANIA, RD, LD  Clinic #: [phone]

## 2024-05-10 ENCOUNTER — VIRTUAL VISIT (OUTPATIENT)
Dept: PSYCHOLOGY | Facility: CLINIC | Age: 39
End: 2024-05-10
Payer: COMMERCIAL

## 2024-05-10 DIAGNOSIS — E66.813 CLASS 3 SEVERE OBESITY DUE TO EXCESS CALORIES WITH SERIOUS COMORBIDITY AND BODY MASS INDEX (BMI) OF 40.0 TO 44.9 IN ADULT (H): ICD-10-CM

## 2024-05-10 DIAGNOSIS — E66.01 CLASS 3 SEVERE OBESITY DUE TO EXCESS CALORIES WITH SERIOUS COMORBIDITY AND BODY MASS INDEX (BMI) OF 40.0 TO 44.9 IN ADULT (H): ICD-10-CM

## 2024-05-10 DIAGNOSIS — F33.1 MAJOR DEPRESSIVE DISORDER, RECURRENT EPISODE, MODERATE (H): Primary | ICD-10-CM

## 2024-05-10 DIAGNOSIS — F54 PSYCHOLOGICAL FACTORS AFFECTING MEDICAL CONDITION: ICD-10-CM

## 2024-05-10 PROCEDURE — 90837 PSYTX W PT 60 MINUTES: CPT | Mod: 95 | Performed by: PSYCHOLOGIST
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
## 2024-05-11 ENCOUNTER — HEALTH MAINTENANCE LETTER (OUTPATIENT)
Age: 39
End: 2024-05-11

## 2024-06-04 ENCOUNTER — TELEPHONE (OUTPATIENT)
Dept: ENDOCRINOLOGY | Facility: CLINIC | Age: 39
End: 2024-06-04

## 2024-06-04 NOTE — TELEPHONE ENCOUNTER
Patient needs to be rescheduled for their virtual visit due to Reason for Reschedule: Patient Request    Appointment mode: Video  Provider: Kanwal Hunter

## 2024-06-17 ENCOUNTER — VIRTUAL VISIT (OUTPATIENT)
Dept: PSYCHOLOGY | Facility: CLINIC | Age: 39
End: 2024-06-17
Payer: COMMERCIAL

## 2024-06-17 DIAGNOSIS — E66.01 CLASS 3 SEVERE OBESITY DUE TO EXCESS CALORIES WITH SERIOUS COMORBIDITY AND BODY MASS INDEX (BMI) OF 40.0 TO 44.9 IN ADULT (H): ICD-10-CM

## 2024-06-17 DIAGNOSIS — F33.1 MAJOR DEPRESSIVE DISORDER, RECURRENT EPISODE, MODERATE (H): Primary | ICD-10-CM

## 2024-06-17 DIAGNOSIS — E66.813 CLASS 3 SEVERE OBESITY DUE TO EXCESS CALORIES WITH SERIOUS COMORBIDITY AND BODY MASS INDEX (BMI) OF 40.0 TO 44.9 IN ADULT (H): ICD-10-CM

## 2024-06-17 DIAGNOSIS — F54 PSYCHOLOGICAL FACTORS AFFECTING MEDICAL CONDITION: ICD-10-CM

## 2024-06-17 PROCEDURE — 99207 PR NO BILLABLE SERVICE THIS VISIT: CPT | Mod: 95 | Performed by: PSYCHOLOGIST

## 2024-06-17 NOTE — PROGRESS NOTES
Health Psychology                    Department of Medicine  Angela Zarate, Ph.D., L.P. [phone]                         [facility] Annamaria Mckinney, Ph.D., L.P. [phone]                     [place] Mail Code 682   FayeRei, Ph.D. [phone]      [address] [place] [address] Jose Blunt, Ph.D., SHAWN.B.P.P., L.P. [phone]              [place] [address]           Dinora Barrientos, Ph.D., L.P. [phone]     Renetta Castañeda, Ph.D., A.B.P.P., L.P. [phone]    [facility]   Subhash Ochoa, Ph.D. (ptpwcq)   [phone]   [address] [place] [address] Psychology Progress Note     Patience arrived per VVF, but then did not join the session.  After multiple callls from me, she joined, stating she  lost her voice, preferred to reschedule.    She will return 7/3 @ 2    Jose Blunt, Ph.D., A.B.P.P., L.P.  Director, Health Psychology  [phone]

## 2024-08-06 DIAGNOSIS — E66.813 CLASS 3 SEVERE OBESITY DUE TO EXCESS CALORIES WITH SERIOUS COMORBIDITY AND BODY MASS INDEX (BMI) OF 40.0 TO 44.9 IN ADULT (H): ICD-10-CM

## 2024-08-06 DIAGNOSIS — E66.01 CLASS 3 SEVERE OBESITY DUE TO EXCESS CALORIES WITH SERIOUS COMORBIDITY AND BODY MASS INDEX (BMI) OF 40.0 TO 44.9 IN ADULT (H): ICD-10-CM

## 2024-08-12 DIAGNOSIS — E66.01 CLASS 3 SEVERE OBESITY DUE TO EXCESS CALORIES WITH SERIOUS COMORBIDITY AND BODY MASS INDEX (BMI) OF 40.0 TO 44.9 IN ADULT (H): ICD-10-CM

## 2024-08-12 DIAGNOSIS — E66.813 CLASS 3 SEVERE OBESITY DUE TO EXCESS CALORIES WITH SERIOUS COMORBIDITY AND BODY MASS INDEX (BMI) OF 40.0 TO 44.9 IN ADULT (H): ICD-10-CM

## 2024-08-12 RX ORDER — TOPIRAMATE 25 MG/1
TABLET, FILM COATED ORAL
Qty: 90 TABLET | Refills: 10 | OUTPATIENT
Start: 2024-08-12

## 2024-08-12 NOTE — TELEPHONE ENCOUNTER
topiramate (TOPAMAX) 25 MG tablet    Take 3 tablets (75 mg) by mouth daily    Last Written Prescription Date:  12/21/23  Last Fill Quantity: 90,   # refills: 3  Last Office Visit : 12/21/23-Follow up 3-4 months return T[facility] [place]   Future Office visit:  0    Refill denied  no appt scheduled   Received refill request for topamax . Patient needs appointment scheduled prior to any refills. Clinic Coordinator notified and will follow up with the patient as appropriate. The pharmacy has been notified that the medication will not be refilled prior to an appointment being scheduled.

## 2024-08-13 ENCOUNTER — TELEPHONE (OUTPATIENT)
Dept: ENDOCRINOLOGY | Facility: CLINIC | Age: 39
End: 2024-08-13
Payer: COMMERCIAL

## 2024-08-13 NOTE — TELEPHONE ENCOUNTER
Refill denied at this time. Patient needs appointment with Renetta Arora PA-C. Pritesh message sent to patient to schedule appointment.

## 2024-08-15 RX ORDER — TOPIRAMATE 25 MG/1
TABLET, FILM COATED ORAL
Qty: 90 TABLET | Refills: 10 | OUTPATIENT
Start: 2024-08-15

## 2024-08-15 NOTE — TELEPHONE ENCOUNTER
Problem: Safety  Goal: Will remain free from injury  Description: Pt uses call light consistently and appropriately. Waits for assistance does not attempt self transfer this shift. Able to verbalize needs.   Outcome: PROGRESSING AS EXPECTED  Note: Reviewed fall and safety precautions with patient and reminded patient to call for assistance before getting out of bed. Patient verbalized understanding and has not attempted self transfer this shift.      Problem: Pain Management  Goal: Pain level will decrease to patient's comfort goal  Outcome: PROGRESSING AS EXPECTED  Note: Patient c/o generalized pain from therapy. Declined any pain med or other intervention. Said she just wants to go to bed early.       Sent myc [facility] 8/13     Patient is requesting refill  for topamax - needs a future appointment before refill will be completed. Please call the patient to schedule the appointment. Once appointment is scheduled instruct the patient to call the pharmacy back to initiate the refill again.    Addressed in a different encounter as above. Refused, no appt in place. CRUZ Richmond R.N.    [facility] Central Nursing Medication Refill Team

## 2024-09-26 DIAGNOSIS — E66.813 CLASS 3 SEVERE OBESITY DUE TO EXCESS CALORIES WITH SERIOUS COMORBIDITY AND BODY MASS INDEX (BMI) OF 40.0 TO 44.9 IN ADULT (H): ICD-10-CM

## 2024-09-26 DIAGNOSIS — E66.01 CLASS 3 SEVERE OBESITY DUE TO EXCESS CALORIES WITH SERIOUS COMORBIDITY AND BODY MASS INDEX (BMI) OF 40.0 TO 44.9 IN ADULT (H): ICD-10-CM

## 2024-09-26 RX ORDER — TOPIRAMATE 25 MG/1
75 TABLET, FILM COATED ORAL DAILY
Qty: 90 TABLET | Refills: 3 | Status: SHIPPED | OUTPATIENT
Start: 2024-09-26

## 2024-09-26 NOTE — TELEPHONE ENCOUNTER
Called and spoke with pharmacy in regards to patient's Topiramate rx. They state that the rx was transferred to them from [facility] pharmacy in June 2024. Patient did fill consistently June through August. Needs refill now. She has appointment scheduled in December with Kylah Bryant PA-C. Routed to ELIZA for sign off.    Unna Boot Text: An Unna boot was placed to help immobilize the limb and facilitate more rapid healing.

## 2024-09-26 NOTE — TELEPHONE ENCOUNTER
topiramate (TOPAMAX) 25 MG tablet   Disp-90 tablet, R-3,   Start: 12/21/2023 12/21/2023  [facility] Renetta Nielsen PA-C  Surgery    Nv: 12/17/24    Routed because: 5/19/24  alt low. Ordered by other provider

## 2024-09-26 NOTE — TELEPHONE ENCOUNTER
Medication Question or Refill    Contacts       Contact Date/Time Type Contact Phone/Fax    09/26/2024 08:53 AM CDT Phone (Incoming) [address] (Pharmacy) [phone]            What medication are you calling about (include dose and sig)?:     topiramate (TOPAMAX) 25 MG tablet     Preferred Pharmacy:    [address]  [address] [facility]  [address]  [facility] [address]  Phone: [phone] Fax: [phone]      Controlled Substance Agreement on file:   CSA -- Patient Level:    CSA: None found at the patient level.       Who prescribed the medication?: Ulysses    Do you need a refill? Yes    When did you use the medication last? Pharm called, not pt    Patient offered an appointment? Yes: 12/11    Do you have any questions or concerns?  Yes:     Needs refill per pharm      Could we send this information to you in [facility] or would you prefer to receive a phone call?:   Patient would prefer a phone call   Okay to leave a detailed message?: Yes at Cell number on file:    Telephone Information:   Mobile [phone]

## 2024-09-28 ENCOUNTER — HEALTH MAINTENANCE LETTER (OUTPATIENT)
Age: 39
End: 2024-09-28

## 2024-11-05 ENCOUNTER — TELEPHONE (OUTPATIENT)
Dept: ENDOCRINOLOGY | Facility: CLINIC | Age: 39
End: 2024-11-05

## 2024-11-05 NOTE — TELEPHONE ENCOUNTER
Patient needs to be rescheduled for their virtual visit due to Reason for Reschedule: Patient Request    Scheduling team, please refer to service line late cancellation/no-show policies and reach out to patient at a later date for rescheduling.    Appointment mode: Video  Provider: Kylah Bryant PA-C

## 2024-12-04 DIAGNOSIS — E66.01 CLASS 3 SEVERE OBESITY DUE TO EXCESS CALORIES WITH SERIOUS COMORBIDITY AND BODY MASS INDEX (BMI) OF 40.0 TO 44.9 IN ADULT (H): ICD-10-CM

## 2024-12-04 DIAGNOSIS — E66.813 CLASS 3 SEVERE OBESITY DUE TO EXCESS CALORIES WITH SERIOUS COMORBIDITY AND BODY MASS INDEX (BMI) OF 40.0 TO 44.9 IN ADULT (H): ICD-10-CM

## 2024-12-07 ENCOUNTER — HEALTH MAINTENANCE LETTER (OUTPATIENT)
Age: 39
End: 2024-12-07

## 2024-12-11 ENCOUNTER — TELEPHONE (OUTPATIENT)
Dept: ENDOCRINOLOGY | Facility: CLINIC | Age: 39
End: 2024-12-11
Payer: COMMERCIAL

## 2024-12-11 RX ORDER — TOPIRAMATE 25 MG/1
TABLET, FILM COATED ORAL
Qty: 90 TABLET | Refills: 10 | OUTPATIENT
Start: 2024-12-11

## 2024-12-11 NOTE — TELEPHONE ENCOUNTER
Sent myc msg 12/11    Topiramate denied, has no future appointment   Please contact patient to schedule appt   FYI to nurses

## 2024-12-11 NOTE — TELEPHONE ENCOUNTER
topiramate (TOPAMAX) 25 MG tablet 90 tablet 3 9/26/2024       Last Office Visit: 12/21/23  Future Office visit:   none    Received refill request for topiramate . Patient needs appointment scheduled prior to any refills. Clinic Coordinator notified and will follow up with the patient as appropriate. The pharmacy has been notified that the medication will not be refilled prior to an appointment being scheduled.        Lizabeth Rice RN  [facility] Central Nursing/Red Flag Triage & Med Refill Team

## 2025-01-18 ENCOUNTER — HEALTH MAINTENANCE LETTER (OUTPATIENT)
Age: 40
End: 2025-01-18

## 2025-04-26 ENCOUNTER — HEALTH MAINTENANCE LETTER (OUTPATIENT)
Age: 40
End: 2025-04-26

## 2025-08-09 ENCOUNTER — HEALTH MAINTENANCE LETTER (OUTPATIENT)
Age: 40
End: 2025-08-09